# Patient Record
Sex: FEMALE | Race: WHITE | NOT HISPANIC OR LATINO | Employment: OTHER | URBAN - METROPOLITAN AREA
[De-identification: names, ages, dates, MRNs, and addresses within clinical notes are randomized per-mention and may not be internally consistent; named-entity substitution may affect disease eponyms.]

---

## 2017-01-06 ENCOUNTER — TELEPHONE (OUTPATIENT)
Dept: UROLOGY | Facility: CLINIC | Age: 82
End: 2017-01-06

## 2017-01-06 NOTE — TELEPHONE ENCOUNTER
Called and spoke to patient about MD recommendations. Pt was told to call us back and let us know how she is doing.  Patient verbalized understanding.

## 2017-01-06 NOTE — TELEPHONE ENCOUNTER
Spoke to pt she states would like to stop vesicare 10mg it makes her mouth to dry. She states bladder feels better an would like to d/c to see how her bladder does. Please advise/adia

## 2017-01-23 ENCOUNTER — OFFICE VISIT (OUTPATIENT)
Dept: CARDIOLOGY | Facility: CLINIC | Age: 82
End: 2017-01-23
Payer: MEDICARE

## 2017-01-23 VITALS
HEART RATE: 68 BPM | SYSTOLIC BLOOD PRESSURE: 144 MMHG | WEIGHT: 180 LBS | OXYGEN SATURATION: 94 % | HEIGHT: 66 IN | DIASTOLIC BLOOD PRESSURE: 71 MMHG | RESPIRATION RATE: 15 BRPM | BODY MASS INDEX: 28.93 KG/M2

## 2017-01-23 DIAGNOSIS — R60.0 EDEMA OF LOWER EXTREMITY, UNSPECIFIED LATERALITY: ICD-10-CM

## 2017-01-23 DIAGNOSIS — Z86.718 HISTORY OF DVT (DEEP VEIN THROMBOSIS): Primary | Chronic | ICD-10-CM

## 2017-01-23 DIAGNOSIS — G25.0 ESSENTIAL TREMOR: ICD-10-CM

## 2017-01-23 DIAGNOSIS — E78.5 HYPERLIPIDEMIA, UNSPECIFIED HYPERLIPIDEMIA TYPE: Chronic | ICD-10-CM

## 2017-01-23 DIAGNOSIS — I77.9 MILD CAROTID ARTERY DISEASE: ICD-10-CM

## 2017-01-23 DIAGNOSIS — I73.9 PERIPHERAL VASCULAR DISEASE: Chronic | ICD-10-CM

## 2017-01-23 DIAGNOSIS — I10 ESSENTIAL HYPERTENSION: Chronic | ICD-10-CM

## 2017-01-23 PROCEDURE — 99999 PR PBB SHADOW E&M-EST. PATIENT-LVL III: CPT | Mod: PBBFAC,,, | Performed by: INTERNAL MEDICINE

## 2017-01-23 PROCEDURE — 1157F ADVNC CARE PLAN IN RCRD: CPT | Mod: S$GLB,,, | Performed by: INTERNAL MEDICINE

## 2017-01-23 PROCEDURE — 99214 OFFICE O/P EST MOD 30 MIN: CPT | Mod: S$GLB,,, | Performed by: INTERNAL MEDICINE

## 2017-01-23 PROCEDURE — 93000 ELECTROCARDIOGRAM COMPLETE: CPT | Mod: S$GLB,,, | Performed by: INTERNAL MEDICINE

## 2017-01-23 PROCEDURE — 1126F AMNT PAIN NOTED NONE PRSNT: CPT | Mod: S$GLB,,, | Performed by: INTERNAL MEDICINE

## 2017-01-23 PROCEDURE — 1160F RVW MEDS BY RX/DR IN RCRD: CPT | Mod: S$GLB,,, | Performed by: INTERNAL MEDICINE

## 2017-01-23 PROCEDURE — 1159F MED LIST DOCD IN RCRD: CPT | Mod: S$GLB,,, | Performed by: INTERNAL MEDICINE

## 2017-01-23 NOTE — PROGRESS NOTES
CARDIOVASCULAR PROGRESS NOTE    REASON FOR CONSULT:   Patito Mcintyre is a 89 y.o. female who presents for follow up of htn and mesenteric PAD.      HISTORY OF PRESENT ILLNESS:   The patient returns today for follow-up.  She is doing well and denies intercurrent angina or dyspnea.  In the interim since her last office visit, the patient apparently developed a left lower extremity DVT.  This apparently was thought to be provoked by a surgical procedure for a facial mole removal.  The patient is since been placed on Xarelto, and no longer has any edema of the left leg.  She otherwise denies operations, lightheadedness, dizziness, or syncope.  There's been no PND, orthopnea, or lower extremity edema.  She denies melena, hematuria, or claudicant symptoms.    Her blood pressure remains elevated.  She no longer appears to be taking diltiazem.  I suggested we increase her valsartan, however the patient would like to defer increasing her medications at this time.  I've asked the patient to maintain a log of her blood pressures at home and bring these with her to her next office visit.    CARDIOVASCULAR HISTORY:   PAD: Probable high-grade stenosis at the origin of the SMA and left renal arteries. (CTA abd 10/27/15)  [facility]  DVT LLE 8/2016    PAST MEDICAL HISTORY:     Past Medical History   Diagnosis Date    Atherosclerosis of abdominal aorta      noted on CT scan 10/27/2015    Back pain     BPPV (benign paroxysmal positional vertigo)     Cancer      skin cancer    Chronic kidney disease, stage III (moderate)     Dependent edema     Diabetes with neurologic complications     DVT (deep venous thrombosis)     Essential tremor     Hearing loss      with hearing aides 1/2014    History of shingles     Hyperlipidemia LDL goal <100     Hypertension     Macular degeneration of left eye     Macular hole      od    Mild carotid artery disease     Mild hyperparathyroidism     Osteoarthritis     Osteoporosis,  postmenopausal     Overweight(278.02)     Peripheral vascular disease     Polymyalgia rheumatica      followed by rheumatology, Dr. Mendez    Stenosis of abdominal aorta      noted on CT scan 10/27/2015; probable high-grade stenosis at the origin of the SMA and left renal arteries    Type II or unspecified type diabetes mellitus without mention of complication, not stated as uncontrolled     Urinary incontinence     Varicose veins        PAST SURGICAL HISTORY:     Past Surgical History   Procedure Laterality Date    Total abdominal hysterectomy      Bilateral leg vein dilation      Left knee arthroscopy      Bilateral cataract surgery      Left macular degeneration eye surgery      Right knee surgery      Cataract extraction       ou    Skin cancer excision       Basil Cell       ALLERGIES AND MEDICATION:     Review of patient's allergies indicates:   Allergen Reactions    Inderal [propranolol]     Indomethacin sodium      Other reaction(s): Vomiting  Other reaction(s): Nausea     Previous Medications    ACETAMINOPHEN (TYLENOL EXTRA STRENGTH ORAL)    Take by mouth.    ASPIRIN (ECOTRIN) 81 MG EC TABLET    Take 1 tablet by mouth Daily. Pt. Take orange flavored chewable tablet.    ATORVASTATIN (LIPITOR) 40 MG TABLET    Take 1 tablet (40 mg total) by mouth every evening.    CALCIUM CARBONATE/VITAMIN D3 (OS-WENDY 500 + D3 ORAL)    Take by mouth.    CHOLECALCIFEROL, VITAMIN D3, 2,000 UNIT TAB    Take 1 tablet by mouth Daily.    GLUCOSAMINE HCL/CHONDR LEARY A NA (OSTEO BI-FLEX ORAL)    Take 1 tablet by mouth once daily.    OMEGA-3S/DHA/EPA/FISH OIL (OMEGA 3 ORAL)    Take 550 mg by mouth. 1 Capsule Oral Every day    OMEPRAZOLE (PRILOSEC) 20 MG CAPSULE    TAKE 1 CAPSULE BY MOUTH TWICE DAILY    RIVAROXABAN (XARELTO) 20 MG TAB    Take 1 tablet (20 mg total) by mouth daily with dinner or evening meal.    VALSARTAN (DIOVAN) 80 MG TABLET    Take 1 tablet (80 mg total) by mouth once daily.    VITAMIN E 200 UNIT CAPSULE     Take 200 Units by mouth once daily.       SOCIAL HISTORY:     Social History     Social History    Marital status:      Spouse name: N/A    Number of children: N/A    Years of education: N/A     Occupational History    Not on file.     Social History Main Topics    Smoking status: Never Smoker    Smokeless tobacco: Never Used    Alcohol use No    Drug use: No    Sexual activity: No     Other Topics Concern    Not on file     Social History Narrative    She lives by herself. She is originally from [place].       FAMILY HISTORY:     Family History   Problem Relation Age of Onset    Hypertension      Edema      Breast cancer Sister     No Known Problems Mother     No Known Problems Father     No Known Problems Brother     No Known Problems Maternal Aunt     No Known Problems Maternal Uncle     No Known Problems Paternal Aunt     No Known Problems Paternal Uncle     No Known Problems Maternal Grandmother     No Known Problems Maternal Grandfather     No Known Problems Paternal Grandmother     No Known Problems Paternal Grandfather     Amblyopia Neg Hx     Blindness Neg Hx     Cataracts Neg Hx     Diabetes Neg Hx     Glaucoma Neg Hx     Macular degeneration Neg Hx     Retinal detachment Neg Hx     Strabismus Neg Hx     Stroke Neg Hx     Thyroid disease Neg Hx     Cancer Neg Hx        REVIEW OF SYSTEMS:   Review of Systems   Constitutional: Negative for chills, diaphoresis and fever.   HENT: Negative for nosebleeds.    Eyes: Negative for blurred vision, double vision and photophobia.   Respiratory: Negative for hemoptysis, shortness of breath and wheezing.    Cardiovascular: Negative for chest pain, palpitations, orthopnea, claudication, leg swelling and PND.   Gastrointestinal: Negative for abdominal pain, blood in stool, heartburn, melena, nausea and vomiting.   Genitourinary: Negative for flank pain and hematuria.   Musculoskeletal: Negative for falls, myalgias and neck  "pain.   Skin: Negative for rash.   Neurological: Positive for tremors. Negative for dizziness, seizures, loss of consciousness, weakness and headaches.   Endo/Heme/Allergies: Negative for polydipsia. Does not bruise/bleed easily.   Psychiatric/Behavioral: Negative for depression and memory loss. The patient is not nervous/anxious.        PHYSICAL EXAM:     Vitals:    01/23/17 1305   BP: (!) 144/71   Pulse: 68   Resp: 15    Body mass index is 29.05 kg/(m^2).  Weight: 81.6 kg (180 lb)   Height: 5' 6" (167.6 cm)     Physical Exam   Constitutional: She is oriented to person, place, and time. She appears well-developed and well-nourished. She is cooperative.  Non-toxic appearance. No distress.   HENT:   Head: Normocephalic and atraumatic.   Eyes: Conjunctivae and EOM are normal. Pupils are equal, round, and reactive to light. No scleral icterus.   Neck: Trachea normal. Neck supple. Normal carotid pulses and no JVD present. Carotid bruit is not present. No rigidity. No edema present. No thyromegaly present.   Cardiovascular: Normal rate, regular rhythm, S1 normal, S2 normal and normal heart sounds.  PMI is not displaced.  Exam reveals no gallop and no friction rub.    No murmur heard.  Pulses:       Carotid pulses are 2+ on the right side, and 2+ on the left side with bruit.  Pulmonary/Chest: Effort normal and breath sounds normal. No respiratory distress. She has no wheezes. She has no rales. She exhibits no tenderness.   Abdominal: Soft. Bowel sounds are normal. She exhibits no distension and no mass. There is no hepatosplenomegaly. There is no tenderness.   Musculoskeletal: She exhibits no edema or tenderness.   Feet:   Right Foot:   Skin Integrity: Negative for ulcer.   Left Foot:   Skin Integrity: Negative for ulcer.   Neurological: She is alert and oriented to person, place, and time. She displays tremor. No cranial nerve deficit.   Skin: Skin is warm and dry. No rash noted. No erythema.   Psychiatric: She has a " normal mood and affect. Her speech is normal and behavior is normal.   Vitals reviewed.      DATA:   EKG: (personally reviewed tracing)  1/23/17 NSR 84, PRWP    Laboratory:  CBC:    Recent Labs  Lab 08/06/16  0441 08/15/16  2236 08/16/16  0447   WHITE BLOOD CELL COUNT 5.58 7.19 6.21   HEMOGLOBIN 13.5 13.2 11.9 L   HEMATOCRIT 41.6 38.2 34.4 L   PLATELETS 177 265 245       CHEMISTRIES:    Recent Labs  Lab 08/16/16 0447 08/18/16 0437 08/18/16 1714 08/19/16 0435   GLUCOSE 102  < > 95 119 H 103   SODIUM 116 LL  < > 137 137 137   POTASSIUM 3.8  < > 4.1 4.3 4.2   BUN BLD 9 L  < > 12 13 12   CREATININE 0.8  < > 0.8 0.9 0.8   EGFR IF AFRICAN AMERICAN >60  < > >60 >60 >60   EGFR IF NON- >60  < > >60 56 A >60   CALCIUM 8.1 L  < > 8.9 9.2 9.1   MAGNESIUM 1.6  --  2.1  --  2.2   < > = values in this interval not displayed.    CARDIAC BIOMARKERS:    Recent Labs  Lab 08/15/16  2236   TROPONIN I <0.006       COAGS:    Recent Labs  Lab 08/05/16 1718 08/06/16 0441   INR 1.0 1.0       LIPIDS/LFTS:    Recent Labs  Lab 04/25/14  0902 07/15/15  0802  03/28/16  0933  08/06/16  0441 08/15/16  2236 08/16/16  0447   CHOLESTEROL 170 177  177  --  111 L  --   --   --   --    TRIGLYCERIDES 141 116  116  --  90  --   --   --   --    HDL 36 L 45  45  --  38 L  --   --   --   --    LDL CHOLESTEROL 105.8 108.8  108.8  --  55.0 L  --   --   --   --    NON-HDL CHOLESTEROL 134 132  132  --  73  --   --   --   --    AST 20 16  < > 30  < > 22 29 25   ALT 14 12  < > 17  < > 16 19 19   < > = values in this interval not displayed.    Cardiovascular Testing:  LLE venous US 8/5/16  There is extensive left lower extremity deep venous thrombosis. This finding was discussed with Dr. Wallis at 4:30 on August 5, 2016 and the patient was sent to the emergency room for further care.    Echo 7/11/16:    1 - Normal left ventricular systolic function (EF 55-60%).  Basal inferior hypokinesis.     2 - Concentric remodeling.     3 - Left  ventricular diastolic dysfunction.     4 - Trivial to mild mitral regurgitation.     5 - Mild tricuspid regurgitation.     6 - Pulmonary hypertension. The estimated PA systolic pressure is 43 mmHg.     CTA abdomen 10/27/15:  Distended contrast-filled bladder. No vesico-enteric fistula identified. Trace amount of contrast seen in the vaginal canal.  Extensive calcified atherosclerotic disease with probable high-grade stenosis at the origin of the SMA and left renal arteries.  Significantly distended gas and fluid-filled stomach.    Carotid US 1/8/16  #1. Findings consistent with less than 50% stenosis in the Right carotid artery bulb.  #2. Findings consistent with less than 50% stenosis in the Left carotid artery bulb.    ASSESSMENT:   # DVT LLE 8/2016, now on Xarelto  # Peripheral vascular disease as evidence by the vascular stenoses of her SMA and L renal arteries. Fortunately, these do not seem to be causing and significant problems/symptoms at this juncture.  # Carotid bruits, pt denies prior h/o CVA/TIA. Carotid US 1/8/16 neg for significant stenosis  # HTN, mildly uncontrolled  # Tachycardia, resolved  # HLP, LDL acceptable  # Venous insufficiency s/p venous stripping    PLAN:   Check LLE venous US to assess for resolution of DVT.  If gone, can stop Xarelto  Cont asa/statin/ARB  Pt to monitor BP at home and bring list to next OV (se did not want to inc her valsartan at this time)  RTC 1 month    Scotty Landin MD, FACC

## 2017-01-23 NOTE — MR AVS SNAPSHOT
[facility]  [address] [place] [address]  Phone: [phone]                  Patito BARRY Sample   2017 1:00 PM   Office Visit    Description:  Female : 3/7/1925   Provider:  Scotty Landin MD   Department[facility]  [facility]           Reason for Visit     Peripheral Vascular Disease                To Do List           Future Appointments        Provider Department Dept Phone    2017 10:00 AM Seema Alejo MD [facility] - Urology [phone]      Goals (5 Years of Data)              8/18/16    3/28/16    7/15/15    HEMOGLOBIN A1C < 7.0   6.0  5.9  6.1    Related Problems    Type 2 diabetes, controlled, with neuropathy      [facility]     [facility] -  Assistance  Registered nurses in the [facility] provide clinical advisement, health education, appointment booking, and other advisory services.  Call for this free service at [phone].             Medications           Message regarding Medications     Verify the changes and/or additions to your medication regime listed below are the same as discussed with your clinician today.  If any of these changes or additions are incorrect, please notify your healthcare provider.             Verify that the below list of medications is an accurate representation of the medications you are currently taking.  If none reported, the list may be blank. If incorrect, please contact your healthcare provider. Carry this list with you in case of emergency.           Current Medications     ACETAMINOPHEN (TYLENOL EXTRA STRENGTH ORAL) Take by mouth.    aspirin (ECOTRIN) 81 MG EC tablet Take 1 tablet by mouth Daily. Pt. Take orange flavored chewable tablet.    atorvastatin (LIPITOR) 40 MG tablet Take 1 tablet (40 mg total) by mouth every evening.    CALCIUM CARBONATE/VITAMIN D3 (OS-WENDY 500 + D3 ORAL) Take by mouth.    cholecalciferol, vitamin D3, 2,000 unit Tab Take 1 tablet by mouth  "Daily.    GLUCOSAMINE HCL/CHONDR LEARY A NA (OSTEO BI-FLEX ORAL) Take 1 tablet by mouth once daily.    OMEGA-3S/DHA/EPA/FISH OIL (OMEGA 3 ORAL) Take 550 mg by mouth. 1 Capsule Oral Every day    omeprazole (PRILOSEC) 20 MG capsule TAKE 1 CAPSULE BY MOUTH TWICE DAILY    rivaroxaban (XARELTO) 20 mg Tab Take 1 tablet (20 mg total) by mouth daily with dinner or evening meal.    valsartan (DIOVAN) 80 MG tablet Take 1 tablet (80 mg total) by mouth once daily.    vitamin E 200 UNIT capsule Take 200 Units by mouth once daily.           Clinical Reference Information           Vital Signs - Last Recorded  Most recent update: 1/23/2017  1:11 PM by Dez Kilgore MA    BP Pulse Resp Ht Wt SpO2    (!) 144/71 68 15 5' 6" (1.676 m) 81.6 kg (180 lb) (!) 94%    BMI                29.05 kg/m2          Blood Pressure          Most Recent Value    BP  (!)  144/71      Allergies as of 1/23/2017     Inderal [Propranolol]    Indomethacin Sodium      Immunizations Administered on Date of Encounter - 1/23/2017     None      "

## 2017-02-09 ENCOUNTER — HOSPITAL ENCOUNTER (OUTPATIENT)
Dept: RADIOLOGY | Facility: HOSPITAL | Age: 82
Discharge: HOME | End: 2017-02-09
Attending: INTERNAL MEDICINE
Payer: MEDICARE

## 2017-02-09 DIAGNOSIS — R60.0 EDEMA OF LOWER EXTREMITY, UNSPECIFIED LATERALITY: ICD-10-CM

## 2017-02-09 DIAGNOSIS — Z86.718 HISTORY OF DVT (DEEP VEIN THROMBOSIS): Chronic | ICD-10-CM

## 2017-02-09 PROCEDURE — 93971 EXTREMITY STUDY: CPT | Mod: 26,,, | Performed by: RADIOLOGY

## 2017-02-09 PROCEDURE — 93971 EXTREMITY STUDY: CPT | Mod: TC

## 2017-02-14 ENCOUNTER — TELEPHONE (OUTPATIENT)
Dept: CARDIOLOGY | Facility: CLINIC | Age: 82
End: 2017-02-14

## 2017-02-14 NOTE — TELEPHONE ENCOUNTER
Left lower extremity venous ultrasound reveals resolution of previously noted DVT.  I will have my office informed the patient that she can discontinue the xarelto.

## 2017-02-15 ENCOUNTER — OFFICE VISIT (OUTPATIENT)
Dept: OPTOMETRY | Facility: CLINIC | Age: 82
End: 2017-02-15
Payer: MEDICARE

## 2017-02-15 DIAGNOSIS — H26.492 PCO (POSTERIOR CAPSULAR OPACIFICATION), LEFT: Primary | ICD-10-CM

## 2017-02-15 DIAGNOSIS — H52.12 MYOPIA, LEFT EYE: ICD-10-CM

## 2017-02-15 PROCEDURE — 92014 COMPRE OPH EXAM EST PT 1/>: CPT | Mod: S$GLB,,, | Performed by: OPTOMETRIST

## 2017-02-15 PROCEDURE — 92015 DETERMINE REFRACTIVE STATE: CPT | Mod: S$GLB,,, | Performed by: OPTOMETRIST

## 2017-02-15 PROCEDURE — 99999 PR PBB SHADOW E&M-EST. PATIENT-LVL II: CPT | Mod: PBBFAC,,, | Performed by: OPTOMETRIST

## 2017-02-15 NOTE — PROGRESS NOTES
HPI     DLS: 5/11/16    Pt c/o vision changes in OS and also feels like she has something in OU   but more of the OS.   Denies flashes.  Hx of Macular hole OD    Meds: Systane ou prn       Last edited by Yimi Colon, OD on 2/15/2017  1:46 PM.         Assessment /Plan     For exam results, see Encounter Report.    PCO (posterior capsular opacification), left  -     Ambulatory Referral to Ophthalmology  -referral Dr. Faulkner YAG consult    Myopia, left eye  -Hold 2/2 PCO  -same Rx as current sRx      RTC as directed OMD

## 2017-02-15 NOTE — MR AVS SNAPSHOT
[facility]  [address] [facility]  [place] [address]  Phone: [phone]  Fax: [phone]                  Patito BARRY Sample   2/15/2017 1:00 PM   Office Visit    Description:  Female : 3/7/1925   Provider:  Yimi Colon OD   Department:  [facility]           Reason for Visit     Concerns About Ocular Health           Diagnoses this Visit        Comments    PCO (posterior capsular opacification), left    -  Primary     Myopia, left eye                To Do List           Future Appointments        Provider Department Dept Phone    2017 1:00 PM Scotty Landin MD [facility] [phone]    2017 10:00 AM Seema Alejo MD [facility] [phone]      Goals (5 Years of Data)              8/18/16    3/28/16    7/15/15    HEMOGLOBIN A1C < 7.0   6.0  5.9  6.1    Related Problems    Type 2 diabetes, controlled, with neuropathy      [facility]     [facility] On [facility] Nurse Care Line -  Assistance  Registered nurses in the [facility] provide clinical advisement, health education, appointment booking, and other advisory services.  Call for this free service at [phone].             Medications           Message regarding Medications     Verify the changes and/or additions to your medication regime listed below are the same as discussed with your clinician today.  If any of these changes or additions are incorrect, please notify your healthcare provider.             Verify that the below list of medications is an accurate representation of the medications you are currently taking.  If none reported, the list may be blank. If incorrect, please contact your healthcare provider. Carry this list with you in case of emergency.           Current Medications     ACETAMINOPHEN (TYLENOL EXTRA STRENGTH ORAL) Take by mouth.    aspirin (ECOTRIN) 81 MG EC tablet Take 1 tablet by mouth Daily. Pt. Take orange flavored chewable tablet.    atorvastatin  (LIPITOR) 40 MG tablet Take 1 tablet (40 mg total) by mouth every evening.    CALCIUM CARBONATE/VITAMIN D3 (OS-WENDY 500 + D3 ORAL) Take by mouth.    cholecalciferol, vitamin D3, 2,000 unit Tab Take 1 tablet by mouth Daily.    GLUCOSAMINE HCL/CHONDR VERA A NA (OSTEO BI-FLEX ORAL) Take 1 tablet by mouth once daily.    OMEGA-3S/DHA/EPA/FISH OIL (OMEGA 3 ORAL) Take 550 mg by mouth. 1 Capsule Oral Every day    omeprazole (PRILOSEC) 20 MG capsule TAKE 1 CAPSULE BY MOUTH TWICE DAILY    valsartan (DIOVAN) 80 MG tablet Take 1 tablet (80 mg total) by mouth once daily.    vitamin E 200 UNIT capsule Take 200 Units by mouth once daily.           Clinical Reference Information           Allergies as of 2/15/2017     Inderal [Propranolol]    Indomethacin Sodium      Immunizations Administered on Date of Encounter - 2/15/2017     None      Orders Placed During Today's Visit      Normal Orders This Visit    Ambulatory Referral to Ophthalmology       Language Assistance Services     ATTENTION: Language assistance services are available, free of charge. Please call [phone].      ATENCIÓN: Si aleciala sherrilucian, tiene a vera disposición servicios gratuitos de asistencia lingüística. Llame al [phone].     HAYLEY Ý: N?u b?n nói Ti?ng Vi?t, có các d?ch v? h? tr? ngôn ng? mi?n phí dành cho b?n. G?i s? [phone].         [facility] complies with applicable Federal civil rights laws and does not discriminate on the basis of race, color, national origin, age, disability, or sex.

## 2017-02-23 ENCOUNTER — OFFICE VISIT (OUTPATIENT)
Dept: CARDIOLOGY | Facility: CLINIC | Age: 82
End: 2017-02-23
Payer: MEDICARE

## 2017-02-23 VITALS
RESPIRATION RATE: 15 BRPM | OXYGEN SATURATION: 95 % | SYSTOLIC BLOOD PRESSURE: 137 MMHG | WEIGHT: 179 LBS | DIASTOLIC BLOOD PRESSURE: 85 MMHG | HEART RATE: 95 BPM | BODY MASS INDEX: 28.77 KG/M2 | HEIGHT: 66 IN

## 2017-02-23 DIAGNOSIS — Z86.718 HISTORY OF DVT (DEEP VEIN THROMBOSIS): Chronic | ICD-10-CM

## 2017-02-23 DIAGNOSIS — E78.5 HYPERLIPIDEMIA, UNSPECIFIED HYPERLIPIDEMIA TYPE: Chronic | ICD-10-CM

## 2017-02-23 DIAGNOSIS — G25.0 ESSENTIAL TREMOR: ICD-10-CM

## 2017-02-23 DIAGNOSIS — I77.9 MILD CAROTID ARTERY DISEASE: ICD-10-CM

## 2017-02-23 DIAGNOSIS — I73.9 PERIPHERAL VASCULAR DISEASE: Chronic | ICD-10-CM

## 2017-02-23 DIAGNOSIS — I10 ESSENTIAL HYPERTENSION: Primary | Chronic | ICD-10-CM

## 2017-02-23 PROCEDURE — 1126F AMNT PAIN NOTED NONE PRSNT: CPT | Mod: S$GLB,,, | Performed by: INTERNAL MEDICINE

## 2017-02-23 PROCEDURE — 1159F MED LIST DOCD IN RCRD: CPT | Mod: S$GLB,,, | Performed by: INTERNAL MEDICINE

## 2017-02-23 PROCEDURE — 99214 OFFICE O/P EST MOD 30 MIN: CPT | Mod: S$GLB,,, | Performed by: INTERNAL MEDICINE

## 2017-02-23 PROCEDURE — 1157F ADVNC CARE PLAN IN RCRD: CPT | Mod: S$GLB,,, | Performed by: INTERNAL MEDICINE

## 2017-02-23 PROCEDURE — 1160F RVW MEDS BY RX/DR IN RCRD: CPT | Mod: S$GLB,,, | Performed by: INTERNAL MEDICINE

## 2017-02-23 PROCEDURE — 99999 PR PBB SHADOW E&M-EST. PATIENT-LVL III: CPT | Mod: PBBFAC,,, | Performed by: INTERNAL MEDICINE

## 2017-02-23 NOTE — PROGRESS NOTES
CARDIOVASCULAR PROGRESS NOTE    REASON FOR CONSULT:   Patito Mcintyre is a 89 y.o. female who presents for follow up of htn, DVT.      HISTORY OF PRESENT ILLNESS:   The patient returns today for follow-up.  She is doing well and denies intercurrent angina or dyspnea.  He had no palpitations, lightheadedness, dizziness, or syncope.  There is been no PND, orthopnea, or lower extremity edema.  She denies melena, hematuria, or claudicant symptoms.  She does describe some discomfort in her eyes and the feeling of a foreign body sensation for which she has follow-up with ophthalmology planned in early March.  In the interim since her last office visit, a lower extremity venous ultrasound has revealed resolution of her left lower extremity DVT, and she's been taken off Xarelto.    CARDIOVASCULAR HISTORY:   PAD: Probable high-grade stenosis at the origin of the SMA and left renal arteries. (CTA abd 10/27/15)  CVI  DVT LLE 8/2016, resolved on venous US 2/2017    PAST MEDICAL HISTORY:     Past Medical History   Diagnosis Date    Atherosclerosis of abdominal aorta      noted on CT scan 10/27/2015    Back pain     BPPV (benign paroxysmal positional vertigo)     Cancer      skin cancer    Chronic kidney disease, stage III (moderate)     Dependent edema     Diabetes with neurologic complications     DVT (deep venous thrombosis)     Essential tremor     Hearing loss      with hearing aides 1/2014    History of shingles     Hyperlipidemia LDL goal <100     Hypertension     Macular degeneration of left eye     Macular hole      od    Mild carotid artery disease     Mild hyperparathyroidism     Osteoarthritis     Osteoporosis, postmenopausal     Overweight(278.02)     Peripheral vascular disease     Polymyalgia rheumatica      followed by rheumatology, Dr. Mendez    Stenosis of abdominal aorta      noted on CT scan 10/27/2015; probable high-grade stenosis at the origin of the SMA and left renal arteries    Type  II or unspecified type diabetes mellitus without mention of complication, not stated as uncontrolled     Urinary incontinence     Varicose veins        PAST SURGICAL HISTORY:     Past Surgical History   Procedure Laterality Date    Total abdominal hysterectomy      Bilateral leg vein dilation      Left knee arthroscopy      Bilateral cataract surgery      Left macular degeneration eye surgery      Right knee surgery      Cataract extraction       ou    Skin cancer excision       Basil Cell       ALLERGIES AND MEDICATION:     Review of patient's allergies indicates:   Allergen Reactions    Inderal [propranolol]     Indomethacin sodium      Other reaction(s): Vomiting  Other reaction(s): Nausea     Previous Medications    ACETAMINOPHEN (TYLENOL EXTRA STRENGTH ORAL)    Take by mouth.    ASPIRIN (ECOTRIN) 81 MG EC TABLET    Take 1 tablet by mouth Daily. Pt. Take orange flavored chewable tablet.    ATORVASTATIN (LIPITOR) 40 MG TABLET    Take 1 tablet (40 mg total) by mouth every evening.    CALCIUM CARBONATE/VITAMIN D3 (OS-EWNDY 500 + D3 ORAL)    Take by mouth.    CHOLECALCIFEROL, VITAMIN D3, 2,000 UNIT TAB    Take 1 tablet by mouth Daily.    GLUCOSAMINE HCL/CHONDR LEARY A NA (OSTEO BI-FLEX ORAL)    Take 1 tablet by mouth once daily.    OMEGA-3S/DHA/EPA/FISH OIL (OMEGA 3 ORAL)    Take 550 mg by mouth. 1 Capsule Oral Every day    OMEPRAZOLE (PRILOSEC) 20 MG CAPSULE    TAKE 1 CAPSULE BY MOUTH TWICE DAILY    VALSARTAN (DIOVAN) 80 MG TABLET    Take 1 tablet (80 mg total) by mouth once daily.    VITAMIN E 200 UNIT CAPSULE    Take 200 Units by mouth once daily.       SOCIAL HISTORY:     Social History     Social History    Marital status:      Spouse name: N/A    Number of children: N/A    Years of education: N/A     Occupational History    Not on file.     Social History Main Topics    Smoking status: Never Smoker    Smokeless tobacco: Never Used    Alcohol use No    Drug use: No    Sexual activity: No      Other Topics Concern    Not on file     Social History Narrative    She lives by herself. She is originally from [place].       FAMILY HISTORY:     Family History   Problem Relation Age of Onset    Hypertension      Edema      Breast cancer Sister     No Known Problems Mother     No Known Problems Father     No Known Problems Brother     No Known Problems Maternal Aunt     No Known Problems Maternal Uncle     No Known Problems Paternal Aunt     No Known Problems Paternal Uncle     No Known Problems Maternal Grandmother     No Known Problems Maternal Grandfather     No Known Problems Paternal Grandmother     No Known Problems Paternal Grandfather     Amblyopia Neg Hx     Blindness Neg Hx     Cataracts Neg Hx     Diabetes Neg Hx     Glaucoma Neg Hx     Macular degeneration Neg Hx     Retinal detachment Neg Hx     Strabismus Neg Hx     Stroke Neg Hx     Thyroid disease Neg Hx     Cancer Neg Hx        REVIEW OF SYSTEMS:   Review of Systems   Constitutional: Negative for chills, diaphoresis and fever.   HENT: Negative for nosebleeds.    Eyes: Negative for blurred vision, double vision and photophobia.   Respiratory: Negative for hemoptysis, shortness of breath and wheezing.    Cardiovascular: Negative for chest pain, palpitations, orthopnea, claudication, leg swelling and PND.   Gastrointestinal: Negative for abdominal pain, blood in stool, heartburn, melena, nausea and vomiting.   Genitourinary: Negative for flank pain and hematuria.   Musculoskeletal: Negative for falls, myalgias and neck pain.   Skin: Negative for rash.   Neurological: Positive for tremors. Negative for dizziness, seizures, loss of consciousness, weakness and headaches.   Endo/Heme/Allergies: Negative for polydipsia. Does not bruise/bleed easily.   Psychiatric/Behavioral: Negative for depression and memory loss. The patient is not nervous/anxious.        PHYSICAL EXAM:     Vitals:    02/23/17 1303   BP: 137/85   Pulse: 95  "  Resp: 15    Body mass index is 28.89 kg/(m^2).  Weight: 81.2 kg (179 lb 0.2 oz)   Height: 5' 6" (167.6 cm)     Physical Exam   Constitutional: She is oriented to person, place, and time. She appears well-developed and well-nourished. She is cooperative.  Non-toxic appearance. No distress.   HENT:   Head: Normocephalic and atraumatic.   Eyes: Conjunctivae and EOM are normal. Pupils are equal, round, and reactive to light. No scleral icterus.   Neck: Trachea normal and normal range of motion. Neck supple. Normal carotid pulses and no JVD present. Carotid bruit is not present. No rigidity. No edema present. No thyromegaly present.   Cardiovascular: Normal rate, regular rhythm, S1 normal and S2 normal.  PMI is not displaced.  Exam reveals no gallop and no friction rub.    No murmur heard.  Pulses:       Carotid pulses are 2+ on the right side, and 2+ on the left side with bruit.  Pulmonary/Chest: Effort normal and breath sounds normal. No respiratory distress. She has no wheezes. She has no rales. She exhibits no tenderness.   Abdominal: Soft. Bowel sounds are normal. She exhibits no distension and no mass. There is no hepatosplenomegaly. There is no tenderness.   Musculoskeletal: She exhibits no edema or tenderness.   Feet:   Right Foot:   Skin Integrity: Negative for ulcer.   Left Foot:   Skin Integrity: Negative for ulcer.   Neurological: She is alert and oriented to person, place, and time. She displays tremor. No cranial nerve deficit.   Skin: Skin is warm and dry. No rash noted. No erythema.   Psychiatric: She has a normal mood and affect. Her speech is normal and behavior is normal.   Vitals reviewed.      DATA:   EKG: (personally reviewed tracing)  1/23/17 NSR 84, PRWP    Laboratory:  CBC:    Recent Labs  Lab 08/06/16  0441 08/15/16  2236 08/16/16  0447   WHITE BLOOD CELL COUNT 5.58 7.19 6.21   HEMOGLOBIN 13.5 13.2 11.9 L   HEMATOCRIT 41.6 38.2 34.4 L   PLATELETS 177 265 245       CHEMISTRIES:    Recent " Labs  Lab 08/16/16 0447  08/18/16 0437 08/18/16 1714 08/19/16 0435   GLUCOSE 102  < > 95 119 H 103   SODIUM 116 LL  < > 137 137 137   POTASSIUM 3.8  < > 4.1 4.3 4.2   BUN BLD 9 L  < > 12 13 12   CREATININE 0.8  < > 0.8 0.9 0.8   EGFR IF AFRICAN AMERICAN >60  < > >60 >60 >60   EGFR IF NON- >60  < > >60 56 A >60   CALCIUM 8.1 L  < > 8.9 9.2 9.1   MAGNESIUM 1.6  --  2.1  --  2.2   < > = values in this interval not displayed.    CARDIAC BIOMARKERS:    Recent Labs  Lab 08/15/16  2236   TROPONIN I <0.006       COAGS:    Recent Labs  Lab 08/05/16 1718 08/06/16 0441   INR 1.0 1.0       LIPIDS/LFTS:    Recent Labs  Lab 04/25/14  0902 07/15/15  0802  03/28/16  0933  08/06/16  0441 08/15/16  2236 08/16/16  0447   CHOLESTEROL 170 177  177  --  111 L  --   --   --   --    TRIGLYCERIDES 141 116  116  --  90  --   --   --   --    HDL 36 L 45  45  --  38 L  --   --   --   --    LDL CHOLESTEROL 105.8 108.8  108.8  --  55.0 L  --   --   --   --    NON-HDL CHOLESTEROL 134 132  132  --  73  --   --   --   --    AST 20 16  < > 30  < > 22 29 25   ALT 14 12  < > 17  < > 16 19 19   < > = values in this interval not displayed.    Cardiovascular Testing:  LLE venous US 2/9/17 (DVT from 8/5/16 resolved)  No evidence of DVT in the left lower extremity.    Echo 7/11/16:    1 - Normal left ventricular systolic function (EF 55-60%).  Basal inferior hypokinesis.     2 - Concentric remodeling.     3 - Left ventricular diastolic dysfunction.     4 - Trivial to mild mitral regurgitation.     5 - Mild tricuspid regurgitation.     6 - Pulmonary hypertension. The estimated PA systolic pressure is 43 mmHg.     CTA abdomen 10/27/15:  Distended contrast-filled bladder. No vesico-enteric fistula identified. Trace amount of contrast seen in the vaginal canal.  Extensive calcified atherosclerotic disease with probable high-grade stenosis at the origin of the SMA and left renal arteries.  Significantly distended gas and fluid-filled  stomach.    Carotid US 1/8/16  #1. Findings consistent with less than 50% stenosis in the Right carotid artery bulb.  #2. Findings consistent with less than 50% stenosis in the Left carotid artery bulb.    ASSESSMENT:   # DVT LLE 8/2016, resolved by US 2/2017  # Peripheral vascular disease as evidence by the vascular stenoses of her SMA and L renal arteries. Fortunately, these do not seem to be causing and significant problems/symptoms at this juncture.  # Carotid bruits, pt denies prior h/o CVA/TIA. Carotid US 1/8/16 neg for significant stenosis  # HTN, controlled  # HLP, LDL acceptable  # Venous insufficiency s/p venous stripping    PLAN:   Cont med rx  RTC 6 months    Scotty Landin MD, FACC

## 2017-02-23 NOTE — MR AVS SNAPSHOT
[facility] - Cardiology  [address] [address] [place] [address]  Phone: [phone]                  Patito BARRY Sample   2017 1:00 PM   Office Visit    Description:  Female : 3/7/1925   Provider:  Scotty Landin MD   Department:  [facility] [place] [place] [facility]           Reason for Visit     Hypertension     Follow-up                To Do List           Future Appointments        Provider Department Dept Phone    3/9/2017 10:00 AM Henrry Faulkner MD [facility] [phone]    2017 10:00 AM Seema Alejo MD [facility] - Urology [phone]      Goals (5 Years of Data)              8/18/16    3/28/16    7/15/15    HEMOGLOBIN A1C < 7.0   6.0  5.9  6.1    Related Problems    Type 2 diabetes, controlled, with neuropathy      [facility] On Call     [facility] On Call Nurse Care Line -  Assistance  Registered nurses in the [facility] On Call Center provide clinical advisement, health education, appointment booking, and other advisory services.  Call for this free service at [phone].             Medications           Message regarding Medications     Verify the changes and/or additions to your medication regime listed below are the same as discussed with your clinician today.  If any of these changes or additions are incorrect, please notify your healthcare provider.             Verify that the below list of medications is an accurate representation of the medications you are currently taking.  If none reported, the list may be blank. If incorrect, please contact your healthcare provider. Carry this list with you in case of emergency.           Current Medications     ACETAMINOPHEN (TYLENOL EXTRA STRENGTH ORAL) Take by mouth.    aspirin (ECOTRIN) 81 MG EC tablet Take 1 tablet by mouth Daily. Pt. Take orange flavored chewable tablet.    atorvastatin (LIPITOR) 40 MG tablet Take 1 tablet (40 mg total) by mouth every evening.    CALCIUM CARBONATE/VITAMIN D3 (OS-WENDY 500 + D3 ORAL) Take  "by mouth.    cholecalciferol, vitamin D3, 2,000 unit Tab Take 1 tablet by mouth Daily.    GLUCOSAMINE HCL/CHONDR VERA A NA (OSTEO BI-FLEX ORAL) Take 1 tablet by mouth once daily.    OMEGA-3S/DHA/EPA/FISH OIL (OMEGA 3 ORAL) Take 550 mg by mouth. 1 Capsule Oral Every day    omeprazole (PRILOSEC) 20 MG capsule TAKE 1 CAPSULE BY MOUTH TWICE DAILY    valsartan (DIOVAN) 80 MG tablet Take 1 tablet (80 mg total) by mouth once daily.    vitamin E 200 UNIT capsule Take 200 Units by mouth once daily.           Clinical Reference Information           Your Vitals Were     BP Pulse Resp Height Weight SpO2    137/85 104 15 5' 6" (1.676 m) 81.2 kg (179 lb 0.2 oz) 95%    BMI                28.89 kg/m2          Blood Pressure          Most Recent Value    BP  137/85      Allergies as of 2/23/2017     Inderal [Propranolol]    Indomethacin Sodium      Immunizations Administered on Date of Encounter - 2/23/2017     None      Language Assistance Services     ATTENTION: Language assistance services are available, free of charge. Please call [phone].      ATENCIÓN: Si habla james, tiene a vera disposición servicios gratuitos de asistencia lingüística. Llame al [phone].     HAYLEY Ý: N?u b?n nói Ti?ng Vi?t, có các d?ch v? h? tr? ngôn ng? mi?n phí dành cho b?n. G?i s? [phone].         [facility] complies with applicable Federal civil rights laws and does not discriminate on the basis of race, color, national origin, age, disability, or sex.        "

## 2017-03-03 DIAGNOSIS — E11.9 TYPE 2 DIABETES MELLITUS WITHOUT COMPLICATION: ICD-10-CM

## 2017-03-06 ENCOUNTER — TELEPHONE (OUTPATIENT)
Dept: OPHTHALMOLOGY | Facility: CLINIC | Age: 82
End: 2017-03-06

## 2017-03-23 ENCOUNTER — INITIAL CONSULT (OUTPATIENT)
Dept: OPHTHALMOLOGY | Facility: CLINIC | Age: 82
End: 2017-03-23
Payer: MEDICARE

## 2017-03-23 VITALS — SYSTOLIC BLOOD PRESSURE: 136 MMHG | HEART RATE: 81 BPM | DIASTOLIC BLOOD PRESSURE: 81 MMHG

## 2017-03-23 DIAGNOSIS — H26.492 PCO (POSTERIOR CAPSULAR OPACIFICATION), LEFT: Primary | ICD-10-CM

## 2017-03-23 DIAGNOSIS — H52.7 REFRACTIVE ERROR: ICD-10-CM

## 2017-03-23 PROCEDURE — 66821 AFTER CATARACT LASER SURGERY: CPT | Mod: LT,S$GLB,, | Performed by: OPHTHALMOLOGY

## 2017-03-23 PROCEDURE — 99999 PR PBB SHADOW E&M-EST. PATIENT-LVL II: CPT | Mod: PBBFAC,,, | Performed by: OPHTHALMOLOGY

## 2017-03-23 PROCEDURE — 92014 COMPRE OPH EXAM EST PT 1/>: CPT | Mod: 57,S$GLB,, | Performed by: OPHTHALMOLOGY

## 2017-03-23 NOTE — LETTER
March 23, 2017      Yimi Colon, OD  [address]  [place] [address]           [facility]  [address] [facility] [address]  [place] [address]  Phone: [phone]  Fax: [phone]          Patient: Patito Mcintyre   MR Number: [id number]   YOB: 1925   Date of Visit: 3/23/2017       Dear Dr. Yimi Colon:    Thank you for referring Patito Mcintyre to me for evaluation. Attached you will find relevant portions of my assessment and plan of care.    If you have questions, please do not hesitate to call me. I look forward to following Patito Mcintyre along with you.    Sincerely,    Henrry Faulkner MD    Enclosure  CC:  No Recipients    If you would like to receive this communication electronically, please contact [email or web address] or [phone] to request more information on [employer] access.    For providers and/or their staff who would like to refer a patient to [facility], please contact us through our one-stop-shop provider referral line, [facility], at [phone].    If you feel you have received this communication in error or would no longer like to receive these types of communications, please e-mail [email or web address]

## 2017-03-23 NOTE — MR AVS SNAPSHOT
[facility]  [address]  [place] [address]  Phone: [phone]  Fax: [phone]                  Patito BARRY Sample   3/23/2017 11:00 AM   Initial consult    Description:  Female : 3/7/1925   Provider:  Henrry Faulkner MD   Department:  [facility]           Reason for Visit     Laser Consultation           Diagnoses this Visit        Comments    PCO (posterior capsular opacification), left    -  Primary     Refractive error                To Do List           Future Appointments        Provider Department Dept Phone    2017 2:30 PM Henrry Faulkner MD [facility] [phone]    7/10/2017 9:40 AM Margaret Stern MD [facility] [phone]    2017 10:40 AM Seema Alejo MD [facility] [phone]      Goals (5 Years of Data)              8/18/16    3/28/16    7/15/15    HEMOGLOBIN A1C < 7.0   6.0  5.9  6.1    Related Problems    Type 2 diabetes, controlled, with neuropathy      Follow-Up and Disposition     Return in about 2 weeks (around 2017) for Post-op Left eye.      [facility] On Call     [facility] On Call Nurse Care Line -  Assistance  Registered nurses in the [facility] On Call Center provide clinical advisement, health education, appointment booking, and other advisory services.  Call for this free service at [phone].             Medications           Message regarding Medications     Verify the changes and/or additions to your medication regime listed below are the same as discussed with your clinician today.  If any of these changes or additions are incorrect, please notify your healthcare provider.             Verify that the below list of medications is an accurate representation of the medications you are currently taking.  If none reported, the list may be blank. If incorrect, please contact your healthcare provider. Carry this list with you in case of emergency.           Current  Medications     ACETAMINOPHEN (TYLENOL EXTRA STRENGTH ORAL) Take by mouth.    aspirin (ECOTRIN) 81 MG EC tablet Take 1 tablet by mouth Daily. Pt. Take orange flavored chewable tablet.    atorvastatin (LIPITOR) 40 MG tablet Take 1 tablet (40 mg total) by mouth every evening.    CALCIUM CARBONATE/VITAMIN D3 (OS-WENDY 500 + D3 ORAL) Take by mouth.    cholecalciferol, vitamin D3, 2,000 unit Tab Take 1 tablet by mouth Daily.    GLUCOSAMINE HCL/CHONDR LEARY A NA (OSTEO BI-FLEX ORAL) Take 1 tablet by mouth once daily.    OMEGA-3S/DHA/EPA/FISH OIL (OMEGA 3 ORAL) Take 550 mg by mouth. 1 Capsule Oral Every day    omeprazole (PRILOSEC) 20 MG capsule TAKE 1 CAPSULE BY MOUTH TWICE DAILY    valsartan (DIOVAN) 80 MG tablet Take 1 tablet (80 mg total) by mouth once daily.    vitamin E 200 UNIT capsule Take 200 Units by mouth once daily.           Clinical Reference Information           Your Vitals Were     BP Pulse                136/81 (BP Location: Right arm, Patient Position: Sitting, BP Method: Automatic) 81          Blood Pressure          Most Recent Value    BP  136/81      Allergies as of 3/23/2017     Inderal [Propranolol]    Indomethacin Sodium      Immunizations Administered on Date of Encounter - 3/23/2017     None      [facility] Sign-Up     Activating your [facility] account is as easy as 1-2-3!     1) Visit [email or web address], select Sign Up Now, enter this activation code and your date of birth, then select Next.  Activation code not generated  Current Patient Portal Status: Account disabled      2) Create a username and password to use when you visit [facility] in the future and select a security question in case you lose your password and select Next.    3) Enter your e-mail address and click Sign Up!    Additional Information  If you have questions, please e-mail [email or web address] or call [phone] to talk to our [facility] staff. Remember, [facility] is NOT to be used for urgent needs. For medical emergencies, dial  911.         Language Assistance Services     ATTENTION: Language assistance services are available, free of charge. Please call [phone].      ATENCIÓN: Si habla james, tiene a vera disposición servicios gratuitos de asistencia lingüística. Llame al [phone].     CHÚ Ý: N?u b?n nói Ti?ng Vi?t, có các d?ch v? h? tr? ngôn ng? mi?n phí dành cho b?n. G?i s? [phone].         [facility] complies with applicable Federal civil rights laws and does not discriminate on the basis of race, color, national origin, age, disability, or sex.

## 2017-03-23 NOTE — PROGRESS NOTES
Subjective:       Patient ID: Patito BARRY Sample is a 89 y.o. female.    Chief Complaint: Laser Consultation (Pt is here for YAg Laser OS)    HPI  Review of Systems    Objective:      Physical Exam    Assessment:       1. PCO (posterior capsular opacification), left    2. Refractive error        Plan:       Visually significant  PCO OS- Pt. Wants Laser.  RE          YAG CAP OS today. TE=   67.0 mj, Avg. 1.0-1.1mj, 64 pulses.     PF taper OS.  RTC 2 wks.

## 2017-04-01 RX ORDER — ATORVASTATIN CALCIUM 40 MG/1
TABLET, FILM COATED ORAL
Qty: 90 TABLET | Refills: 3 | Status: SHIPPED | OUTPATIENT
Start: 2017-04-01 | End: 2018-04-15 | Stop reason: SDUPTHER

## 2017-04-06 ENCOUNTER — OFFICE VISIT (OUTPATIENT)
Dept: OPHTHALMOLOGY | Facility: CLINIC | Age: 82
End: 2017-04-06
Payer: MEDICARE

## 2017-04-06 DIAGNOSIS — Z98.890 POST-OPERATIVE STATE: Primary | ICD-10-CM

## 2017-04-06 DIAGNOSIS — H52.7 REFRACTIVE ERROR: ICD-10-CM

## 2017-04-06 PROCEDURE — 99999 PR PBB SHADOW E&M-EST. PATIENT-LVL II: CPT | Mod: PBBFAC,,, | Performed by: OPHTHALMOLOGY

## 2017-04-06 PROCEDURE — 99024 POSTOP FOLLOW-UP VISIT: CPT | Mod: S$GLB,,, | Performed by: OPHTHALMOLOGY

## 2017-04-06 NOTE — PROGRESS NOTES
Subjective:       Patient ID: Patito BARRY Sample is a 89 y.o. female.    Chief Complaint: Post-op Evaluation (2 week F/U YAG CAP OS. )    HPI  Review of Systems    Objective:      Physical Exam    Assessment:       1. Post-operative state    2. Refractive error        Plan:       S/p YAG CAP OS- Doing well.  RE-Pt does want MRx.        RTC Dr Cloon in 6 mos.

## 2017-04-06 NOTE — MR AVS SNAPSHOT
[facility]  [address] [facility]  [place] [address]  Phone: [phone]  Fax: [phone]                  Patito BARRY Sample   2017 2:30 PM   Office Visit    Description:  Female : 3/7/1925   Provider:  Henrry Faulkner MD   Department:  [facility]           Reason for Visit     Post-op Evaluation           Diagnoses this Visit        Comments    Post-operative state    -  Primary     Refractive error                To Do List           Future Appointments        Provider Department Dept Phone    7/10/2017 9:40 AM Margaret Stern MD [facility] [phone]    2017 10:40 AM Seema Alejo MD [facility] [phone]      Goals (5 Years of Data)              8/18/16    3/28/16    7/15/15    HEMOGLOBIN A1C < 7.0   6.0  5.9  6.1    Related Problems    Type 2 diabetes, controlled, with neuropathy      Follow-Up and Disposition     Return in about 6 months (around 10/6/2017) for Dr Colon, F/U S/P YAG CAP OS..      [facility] On [facility]     [facility] On Call Nurse Care Line -  Assistance  Unless otherwise directed by your provider, please contact [facility] On-Call, our nurse care line that is available for  assistance.     Registered nurses in the [facility] provide: appointment scheduling, clinical advisement, health education, and other advisory services.  Call: [phone] (toll free)               Medications           Message regarding Medications     Verify the changes and/or additions to your medication regime listed below are the same as discussed with your clinician today.  If any of these changes or additions are incorrect, please notify your healthcare provider.             Verify that the below list of medications is an accurate representation of the medications you are currently taking.  If none reported, the list may be blank. If incorrect, please contact your healthcare provider. Carry this list with you in case of  emergency.           Current Medications     ACETAMINOPHEN (TYLENOL EXTRA STRENGTH ORAL) Take by mouth.    aspirin (ECOTRIN) 81 MG EC tablet Take 1 tablet by mouth Daily. Pt. Take orange flavored chewable tablet.    atorvastatin (LIPITOR) 40 MG tablet TAKE 1 TABLET BY MOUTH EVERY EVENING    CALCIUM CARBONATE/VITAMIN D3 (OS-WENDY 500 + D3 ORAL) Take by mouth.    cholecalciferol, vitamin D3, 2,000 unit Tab Take 1 tablet by mouth Daily.    GLUCOSAMINE HCL/CHONDR LEARY A NA (OSTEO BI-FLEX ORAL) Take 1 tablet by mouth once daily.    OMEGA-3S/DHA/EPA/FISH OIL (OMEGA 3 ORAL) Take 550 mg by mouth. 1 Capsule Oral Every day    omeprazole (PRILOSEC) 20 MG capsule TAKE 1 CAPSULE BY MOUTH TWICE DAILY    valsartan (DIOVAN) 80 MG tablet Take 1 tablet (80 mg total) by mouth once daily.    vitamin E 200 UNIT capsule Take 200 Units by mouth once daily.           Clinical Reference Information           Allergies as of 4/6/2017     Inderal [Propranolol]    Indomethacin Sodium      Immunizations Administered on Date of Encounter - 4/6/2017     None      [facility] Sign-Up     Activating your [facility] account is as easy as 1-2-3!     1) Visit [email or web address], select Sign Up Now, enter this activation code and your date of birth, then select Next.  Activation code not generated  Current Patient Portal Status: Account disabled      2) Create a username and password to use when you visit [facility] in the future and select a security question in case you lose your password and select Next.    3) Enter your e-mail address and click Sign Up!    Additional Information  If you have questions, please e-mail [email or web address] or call [phone] to talk to our [facility] staff. Remember, [facility] is NOT to be used for urgent needs. For medical emergencies, dial 911.         Language Assistance Services     ATTENTION: Language assistance services are available, free of charge. Please call [phone].      ATENCIÓN: ki Tapia  disposición servicios gratuitos de asistencia lingüística. Valeria al [phone].     HAYLEY Ý: N?u b?n nói Ti?ng Vi?t, có các d?ch v? h? tr? ngôn ng? mi?n phí dành cho b?n. G?i s? [phone].         [facility] complies with applicable Federal civil rights laws and does not discriminate on the basis of race, color, national origin, age, disability, or sex.

## 2017-04-21 DIAGNOSIS — E11.9 TYPE 2 DIABETES MELLITUS WITHOUT COMPLICATION: ICD-10-CM

## 2017-05-30 DIAGNOSIS — I10 ESSENTIAL HYPERTENSION: Chronic | ICD-10-CM

## 2017-05-30 RX ORDER — VALSARTAN 80 MG/1
TABLET ORAL
Qty: 90 TABLET | Refills: 0 | Status: SHIPPED | OUTPATIENT
Start: 2017-05-30 | End: 2017-09-12 | Stop reason: SDUPTHER

## 2017-07-10 ENCOUNTER — OFFICE VISIT (OUTPATIENT)
Dept: FAMILY MEDICINE | Facility: CLINIC | Age: 82
End: 2017-07-10
Payer: MEDICARE

## 2017-07-10 VITALS
BODY MASS INDEX: 29.66 KG/M2 | WEIGHT: 178 LBS | TEMPERATURE: 99 F | SYSTOLIC BLOOD PRESSURE: 132 MMHG | OXYGEN SATURATION: 96 % | HEART RATE: 98 BPM | HEIGHT: 65 IN | DIASTOLIC BLOOD PRESSURE: 78 MMHG

## 2017-07-10 DIAGNOSIS — M94.9 DISORDER OF BONE AND CARTILAGE: ICD-10-CM

## 2017-07-10 DIAGNOSIS — I10 ESSENTIAL HYPERTENSION: Chronic | ICD-10-CM

## 2017-07-10 DIAGNOSIS — E78.5 HYPERLIPIDEMIA, UNSPECIFIED HYPERLIPIDEMIA TYPE: Chronic | ICD-10-CM

## 2017-07-10 DIAGNOSIS — M89.9 DISORDER OF BONE AND CARTILAGE: ICD-10-CM

## 2017-07-10 DIAGNOSIS — E11.40 TYPE 2 DIABETES, CONTROLLED, WITH NEUROPATHY: Primary | ICD-10-CM

## 2017-07-10 DIAGNOSIS — Q25.1 STENOSIS OF ABDOMINAL AORTA: ICD-10-CM

## 2017-07-10 DIAGNOSIS — G25.0 ESSENTIAL TREMOR: ICD-10-CM

## 2017-07-10 DIAGNOSIS — K21.9 GASTROESOPHAGEAL REFLUX DISEASE WITHOUT ESOPHAGITIS: Chronic | ICD-10-CM

## 2017-07-10 DIAGNOSIS — I70.0 ATHEROSCLEROSIS OF ABDOMINAL AORTA: ICD-10-CM

## 2017-07-10 DIAGNOSIS — I73.9 PERIPHERAL VASCULAR DISEASE: Chronic | ICD-10-CM

## 2017-07-10 PROBLEM — H52.7 REFRACTIVE ERROR: Status: RESOLVED | Noted: 2017-03-23 | Resolved: 2017-07-10

## 2017-07-10 PROBLEM — Z98.890 POST-OPERATIVE STATE: Status: RESOLVED | Noted: 2017-04-06 | Resolved: 2017-07-10

## 2017-07-10 PROCEDURE — 1159F MED LIST DOCD IN RCRD: CPT | Mod: S$GLB,,, | Performed by: INTERNAL MEDICINE

## 2017-07-10 PROCEDURE — 99999 PR PBB SHADOW E&M-EST. PATIENT-LVL III: CPT | Mod: PBBFAC,,, | Performed by: INTERNAL MEDICINE

## 2017-07-10 PROCEDURE — 99499 UNLISTED E&M SERVICE: CPT | Mod: S$GLB,,, | Performed by: INTERNAL MEDICINE

## 2017-07-10 PROCEDURE — 99214 OFFICE O/P EST MOD 30 MIN: CPT | Mod: S$GLB,,, | Performed by: INTERNAL MEDICINE

## 2017-07-10 PROCEDURE — 1126F AMNT PAIN NOTED NONE PRSNT: CPT | Mod: S$GLB,,, | Performed by: INTERNAL MEDICINE

## 2017-07-10 NOTE — PROGRESS NOTES
HISTORY OF PRESENT ILLNESS:  Patito Mcintyre is a 89 y.o. female who presents to the clinic today for Hyperlipidemia; Hypertension; and Gastroesophageal Reflux  .  The patient presents to clinic today for follow-up of her type 2 diabetes mellitus complicated by neuropathy, hypertension, hyperlipidemia.  She states blood pressures at home are well controlled.  She denies cardiac chest pain or shortness of breath.  She recently traveled for 2 weeks to [place].  She wore strong support stockings.  She did not have any problems with lower extremity edema.  She denies any significant symptoms with heartburn or reflux.  She was taken off Xarelto because in February of this year lower extremity ultrasound showed resolution of her DVT.  Followed by cardiology.  She has a stable chronic tremor.  She denies abdominal pain, temperature intolerance, or unexplained changes in her weight.      PAST MEDICAL HISTORY:  Past Medical History:   Diagnosis Date    Atherosclerosis of abdominal aorta     noted on CT scan 10/27/2015    Back pain     BPPV (benign paroxysmal positional vertigo)     Cancer     skin cancer    Chronic kidney disease, stage III (moderate)     Dependent edema     Diabetes with neurologic complications     DVT (deep venous thrombosis)     Essential tremor     Hearing loss     with hearing aides 1/2014    History of shingles     Hyperlipidemia LDL goal <100     Hypertension     Left posterior capsular opacification 3/23/2017    Macular degeneration of left eye     Macular hole     od    Mild carotid artery disease     Mild hyperparathyroidism     Osteoarthritis     Osteoporosis, postmenopausal     Overweight(278.02)     Peripheral vascular disease     Polymyalgia rheumatica     followed by rheumatology, Dr. Mendez    Stenosis of abdominal aorta     noted on CT scan 10/27/2015; probable high-grade stenosis at the origin of the SMA and left renal arteries    Type II or unspecified type  diabetes mellitus without mention of complication, not stated as uncontrolled     Urinary incontinence     Varicose veins        PAST SURGICAL HISTORY:  Past Surgical History:   Procedure Laterality Date    bilateral cataract surgery      bilateral leg vein dilation      CATARACT EXTRACTION      ou    left knee arthroscopy      left macular degeneration eye surgery      right knee surgery      SKIN CANCER EXCISION      Basil Cell    TOTAL ABDOMINAL HYSTERECTOMY      YAG Laser Capsulotomy Left 03/23/2017    Dr. Faulkner       SOCIAL HISTORY:  Social History     Social History    Marital status:      Spouse name: N/A    Number of children: N/A    Years of education: N/A     Occupational History    Not on file.     Social History Main Topics    Smoking status: Never Smoker    Smokeless tobacco: Never Used    Alcohol use No    Drug use: No    Sexual activity: No     Other Topics Concern    Not on file     Social History Narrative    She lives by herself. She is originally from [place].       FAMILY HISTORY:  Family History   Problem Relation Age of Onset    Hypertension      Edema      Breast cancer Sister     No Known Problems Mother     No Known Problems Father     No Known Problems Brother     No Known Problems Maternal Aunt     No Known Problems Maternal Uncle     No Known Problems Paternal Aunt     No Known Problems Paternal Uncle     No Known Problems Maternal Grandmother     No Known Problems Maternal Grandfather     No Known Problems Paternal Grandmother     No Known Problems Paternal Grandfather     Amblyopia Neg Hx     Blindness Neg Hx     Cataracts Neg Hx     Diabetes Neg Hx     Glaucoma Neg Hx     Macular degeneration Neg Hx     Retinal detachment Neg Hx     Strabismus Neg Hx     Stroke Neg Hx     Thyroid disease Neg Hx     Cancer Neg Hx        ALLERGIES AND MEDICATIONS: updated and reviewed.  Review of patient's allergies indicates:   Allergen Reactions     Inderal [propranolol]     Indomethacin sodium      Other reaction(s): Vomiting  Other reaction(s): Nausea     Medication List with Changes/Refills   Current Medications    ACETAMINOPHEN (TYLENOL EXTRA STRENGTH ORAL)    Take by mouth.    ASPIRIN (ECOTRIN) 81 MG EC TABLET    Take 1 tablet by mouth Daily. Pt. Take orange flavored chewable tablet.    ATORVASTATIN (LIPITOR) 40 MG TABLET    TAKE 1 TABLET BY MOUTH EVERY EVENING    CALCIUM CARBONATE/VITAMIN D3 (OS-WENDY 500 + D3 ORAL)    Take by mouth.    CHOLECALCIFEROL, VITAMIN D3, 2,000 UNIT TAB    Take 1 tablet by mouth Daily.    GLUCOSAMINE HCL/CHONDR LEARY A NA (OSTEO BI-FLEX ORAL)    Take 1 tablet by mouth once daily.    OMEGA-3S/DHA/EPA/FISH OIL (OMEGA 3 ORAL)    Take 550 mg by mouth. 1 Capsule Oral Every day    OMEPRAZOLE (PRILOSEC) 20 MG CAPSULE    TAKE 1 CAPSULE BY MOUTH TWICE DAILY    VALSARTAN (DIOVAN) 80 MG TABLET    TAKE 1 TABLET(80 MG) BY MOUTH EVERY DAY    VITAMIN E 200 UNIT CAPSULE    Take 200 Units by mouth once daily.            REVIEW OF SYSTEMS:  General ROS: negative for - chills, fever or malaise  Psychological ROS: negative for - anxiety, depression, sleep disturbances or suicidal ideation  Ophthalmic ROS: negative for - blurry vision or eye pain  ENT ROS: negative for - epistaxis, headaches, nasal congestion, oral lesions or sore throat  Allergy and Immunology ROS: negative for - hives  Hematological and Lymphatic ROS: negative for - swollen lymph nodes  Endocrine ROS: negative for - polydipsia/polyuria or temperature intolerance  Respiratory ROS: no cough, shortness of breath, or wheezing  Cardiovascular ROS: no chest pain or dyspnea on exertion  Gastrointestinal ROS: no abdominal pain, change in bowel habits, or black or bloody stools  Dermatological ROS: negative for mole changes and rash  Musculoskeletal ROS: negative for - joint swelling or muscle pain  Neurological ROS: no TIA or stroke symptoms  Genito-Urinary ROS: no dysuria, trouble  "voiding, or hematuria        PHYSICAL EXAM:   Vitals:    07/10/17 0939   BP: 132/78   Pulse: 98   Temp: 98.5 °F (36.9 °C)     Weight: 80.7 kg (178 lb 0.3 oz)   Height: 5' 5" (165.1 cm)   Body mass index is 29.62 kg/m².     General appearance - alert, well appearing, and in no distress and overweight  Mental status - alert, oriented to person, place, and time, normal mood, behavior, speech, dress, motor activity, and thought processes  Eyes - pupils equal and reactive, extraocular eye movements intact, sclera anicteric  Mouth - mucous membranes moist, pharynx normal without lesions  Neck - supple, no significant adenopathy, carotids upstroke normal bilaterally, no bruits, thyroid exam: thyroid is normal in size without nodules or tenderness  Lymphatics - no palpable lymphadenopathy  Chest - clear to auscultation, no wheezes, rales or rhonchi, symmetric air entry  Heart - normal rate and regular rhythm, no gallops noted  Back exam - limited range of motion, no pain with motion noted during exam  Neurological - alert, oriented, normal speech, no focal findings noted, cranial nerves II through XII intact; tremor noted  Musculoskeletal - no muscular tenderness noted, Mild-Moderate osteoarthritic changes noted to both knee joints. No joint effusions noted.   Extremities - no pedal edema noted, varicose veins noted  Skin - normal coloration and turgor, no rashes, no suspicious skin lesions noted      Protective Sensation (w/ 10 gram monofilament):  Right: Decreased  Left: Decreased    Visual Inspection:  Normal -  Bilateral    Pedal Pulses:   Right: Present  Left: Present    Posterior tibialis:   Right:Present  Left: Present          ASSESSMENT AND PLAN:  1. Type 2 diabetes, controlled, with neuropathy  Diabetes currently is controlled. We discussed diabetic diet and regular exercise. We discussed home blood sugar monitoring, if appropriate. The current medical regimen is effective;  continue present plan and medications.  "   - Hemoglobin A1c; Future    2. Essential hypertension  Discussed sodium restriction, maintaining ideal body weight and regular exercise program as physiologic means to achieve blood pressure control. The patient will strive towards this. The current medical regimen is effective;  continue present plan and medications. Recommended patient to check home readings to monitor and see me for followup as scheduled or sooner as needed. Patient was educated that both decongestant and anti-inflammatory medication may raise blood pressure.   - CBC auto differential; Future    3. Hyperlipidemia, unspecified hyperlipidemia type  We discussed low fat diet and regular exercise.The current medical regimen is effective;  continue present plan and medications.    - Lipid panel; Future  - Comprehensive metabolic panel; Future    4. Gastroesophageal reflux disease without esophagitis  Symptoms controlled. Reflux precautions discussed (eliminate tobacco if a smoker; minimize caffeine, chocolate and red/white peppermint intake; avoid heavy and spicy meals; don't lay down within 2-3 hours after eating). Medication as needed. Patient asked to take medication breaks, if possible - discussed chronic use can limit calcium absorption, increases the risk for intestinal infections, and can cause kidney damage.      5. Essential tremor  Stable. Observe.    6. Atherosclerosis of abdominal aorta  Patient with Atherosclerosis of the Aorta.  Stable/asymptomatic. Currently stable on lipid lowering medication and b/p monitoring.     7. Stenosis of abdominal aorta  Asymptomatic. Observe.    8. Peripheral vascular disease  Stable. Observe. Support stockings as needed.    9. Disorder of bone and cartilage    - DXA Bone Density Spine And Hip; Future          Return in about 6 months (around 1/10/2018), or if symptoms worsen or fail to improve, for annual exam. or sooner as needed.

## 2017-07-17 ENCOUNTER — HOSPITAL ENCOUNTER (OUTPATIENT)
Dept: RADIOLOGY | Facility: CLINIC | Age: 82
Discharge: HOME | End: 2017-07-17
Attending: INTERNAL MEDICINE
Payer: MEDICARE

## 2017-07-17 DIAGNOSIS — M89.9 DISORDER OF BONE AND CARTILAGE: ICD-10-CM

## 2017-07-17 DIAGNOSIS — M94.9 DISORDER OF BONE AND CARTILAGE: ICD-10-CM

## 2017-07-17 PROCEDURE — 77080 DXA BONE DENSITY AXIAL: CPT | Mod: 26,,, | Performed by: INTERNAL MEDICINE

## 2017-07-17 PROCEDURE — 77080 DXA BONE DENSITY AXIAL: CPT | Mod: TC,PO

## 2017-07-27 ENCOUNTER — TELEPHONE (OUTPATIENT)
Dept: FAMILY MEDICINE | Facility: CLINIC | Age: 82
End: 2017-07-27

## 2017-07-27 DIAGNOSIS — M81.0 OSTEOPOROSIS, POSTMENOPAUSAL: ICD-10-CM

## 2017-07-27 RX ORDER — HEPARIN SODIUM (PORCINE) LOCK FLUSH IV SOLN 100 UNIT/ML 100 UNIT/ML
100 SOLUTION INTRAVENOUS
Status: CANCELLED | OUTPATIENT
Start: 2017-07-27

## 2017-07-27 RX ORDER — ALENDRONATE SODIUM 70 MG/1
70 TABLET ORAL
Qty: 4 TABLET | Refills: 11 | Status: CANCELLED | OUTPATIENT
Start: 2017-07-27 | End: 2018-07-27

## 2017-07-27 RX ORDER — ZOLEDRONIC ACID 5 MG/100ML
5 INJECTION, SOLUTION INTRAVENOUS ONCE
Status: CANCELLED | OUTPATIENT
Start: 2017-07-27 | End: 2017-07-27

## 2017-07-27 RX ORDER — SODIUM CHLORIDE 0.9 % (FLUSH) 0.9 %
10 SYRINGE (ML) INJECTION
Status: CANCELLED | OUTPATIENT
Start: 2017-07-27

## 2017-07-27 RX ORDER — ZOLEDRONIC ACID 5 MG/100ML
5 INJECTION, SOLUTION INTRAVENOUS ONCE
Qty: 100 ML | Refills: 0 | Status: SHIPPED | OUTPATIENT
Start: 2017-07-27 | End: 2017-07-27

## 2017-07-27 NOTE — TELEPHONE ENCOUNTER
Upon further chart review: looks like she briefly took actonel and fosamax in the past. In 2015 per BMD result we switched her to Reclast infusion . She did not get an infusion last year. Will schedule her for infusion now.

## 2017-07-27 NOTE — TELEPHONE ENCOUNTER
Please call patient: her BMD result showed thinning of the bones with high fracture risk. I know she was on fosamax in the past, but not sure why she stopped taking the medication some time in 2015 - can she remember why she stopped taking it? We need to start her back on some type of prescription medication in addition to adequate calcium and vitamin D supplementation.

## 2017-07-27 NOTE — TELEPHONE ENCOUNTER
Spoke w/patient, informed of results and recommendation. States she is taking calcium and Vit-D supplements. States she do not remember why she stopped taking the Fosamax, she think someone told her to stop. Requesting a refill for fosamax.

## 2017-07-28 ENCOUNTER — TELEPHONE (OUTPATIENT)
Dept: FAMILY MEDICINE | Facility: CLINIC | Age: 82
End: 2017-07-28

## 2017-07-28 NOTE — TELEPHONE ENCOUNTER
----- Message from Jace Rahman sent at 7/28/2017  9:29 AM CDT -----  Contact: Self/[phone]  Patient states that the Pharmacy did not receive her request that was prescribed on yesterday. She would like the staff to resend her prescriptions. Patient would like the staff to call her once the prescriptions are confirmed Thank you.

## 2017-07-28 NOTE — TELEPHONE ENCOUNTER
Spoke w/patient, states she received the results of her BMD test and it read your results are in an acceptable range. States the MD is Dr.Khoa Spears. Want to know if she still need to have the reclast infusion. Please advise.

## 2017-07-28 NOTE — TELEPHONE ENCOUNTER
Spoke w/patient, informed of recommendation. patient states she did not have reclast infusion last. Informed order placed for infusion and she will be contacted to schedule. Verbalized understanding.

## 2017-07-28 NOTE — TELEPHONE ENCOUNTER
----- Message from Mandy Hou sent at 7/28/2017  9:44 AM CDT -----  Contact: self  Pt called to discuss test results. [phone]

## 2017-08-16 ENCOUNTER — INFUSION (OUTPATIENT)
Dept: INFUSION THERAPY | Facility: HOSPITAL | Age: 82
End: 2017-08-16
Attending: INTERNAL MEDICINE
Payer: MEDICARE

## 2017-08-16 VITALS — HEART RATE: 62 BPM | DIASTOLIC BLOOD PRESSURE: 64 MMHG | RESPIRATION RATE: 16 BRPM | SYSTOLIC BLOOD PRESSURE: 133 MMHG

## 2017-08-16 DIAGNOSIS — M81.0 OSTEOPOROSIS, POSTMENOPAUSAL: Primary | ICD-10-CM

## 2017-08-16 PROCEDURE — 96365 THER/PROPH/DIAG IV INF INIT: CPT

## 2017-08-16 PROCEDURE — 63600175 PHARM REV CODE 636 W HCPCS: Performed by: INTERNAL MEDICINE

## 2017-08-16 RX ORDER — ZOLEDRONIC ACID 5 MG/100ML
5 INJECTION, SOLUTION INTRAVENOUS ONCE
Status: CANCELLED | OUTPATIENT
Start: 2017-08-16 | End: 2017-08-16

## 2017-08-16 RX ORDER — HEPARIN SODIUM (PORCINE) LOCK FLUSH IV SOLN 100 UNIT/ML 100 UNIT/ML
100 SOLUTION INTRAVENOUS
Status: CANCELLED | OUTPATIENT
Start: 2017-08-16

## 2017-08-16 RX ORDER — ZOLEDRONIC ACID 5 MG/100ML
5 INJECTION, SOLUTION INTRAVENOUS ONCE
Status: COMPLETED | OUTPATIENT
Start: 2017-08-16 | End: 2017-08-16

## 2017-08-16 RX ORDER — SODIUM CHLORIDE 0.9 % (FLUSH) 0.9 %
10 SYRINGE (ML) INJECTION
Status: CANCELLED | OUTPATIENT
Start: 2017-08-16

## 2017-08-16 RX ADMIN — ZOLEDRONIC ACID 5 MG: 5 INJECTION, SOLUTION INTRAVENOUS at 10:08

## 2017-08-16 NOTE — PLAN OF CARE
Problem: Patient Care Overview (Adult)  Goal: Discharge Needs Assessment  Outcome: Ongoing (interventions implemented as appropriate)  Pt will follow up with PCP for next year's dose of Reclast. Pt discharged, accompanied by transport.

## 2017-08-16 NOTE — PLAN OF CARE
Problem: Patient Care Overview (Adult)  Goal: Plan of Care Review  Outcome: Ongoing (interventions implemented as appropriate)  Pt has tolerated Reclast in the past. Reinforced pt of potential side effects and to drink plenty of water today.

## 2017-08-16 NOTE — PLAN OF CARE
Problem: Patient Care Overview (Adult)  Goal: Individualization & Mutuality  Outcome: Ongoing (interventions implemented as appropriate)  Pt likes to watch tv during visit.

## 2017-08-23 ENCOUNTER — OFFICE VISIT (OUTPATIENT)
Dept: CARDIOLOGY | Facility: CLINIC | Age: 82
End: 2017-08-23
Payer: MEDICARE

## 2017-08-23 VITALS
WEIGHT: 180 LBS | OXYGEN SATURATION: 95 % | SYSTOLIC BLOOD PRESSURE: 135 MMHG | RESPIRATION RATE: 15 BRPM | DIASTOLIC BLOOD PRESSURE: 62 MMHG | BODY MASS INDEX: 29.99 KG/M2 | HEIGHT: 65 IN | HEART RATE: 98 BPM

## 2017-08-23 DIAGNOSIS — I73.9 PVD (PERIPHERAL VASCULAR DISEASE): ICD-10-CM

## 2017-08-23 DIAGNOSIS — E78.5 HYPERLIPIDEMIA, UNSPECIFIED HYPERLIPIDEMIA TYPE: Chronic | ICD-10-CM

## 2017-08-23 DIAGNOSIS — I73.9 PERIPHERAL VASCULAR DISEASE: Primary | Chronic | ICD-10-CM

## 2017-08-23 DIAGNOSIS — G25.0 ESSENTIAL TREMOR: ICD-10-CM

## 2017-08-23 DIAGNOSIS — I10 ESSENTIAL HYPERTENSION: Chronic | ICD-10-CM

## 2017-08-23 PROCEDURE — 1126F AMNT PAIN NOTED NONE PRSNT: CPT | Mod: S$GLB,,, | Performed by: INTERNAL MEDICINE

## 2017-08-23 PROCEDURE — 3008F BODY MASS INDEX DOCD: CPT | Mod: S$GLB,,, | Performed by: INTERNAL MEDICINE

## 2017-08-23 PROCEDURE — 1159F MED LIST DOCD IN RCRD: CPT | Mod: S$GLB,,, | Performed by: INTERNAL MEDICINE

## 2017-08-23 PROCEDURE — 99214 OFFICE O/P EST MOD 30 MIN: CPT | Mod: S$GLB,,, | Performed by: INTERNAL MEDICINE

## 2017-08-23 PROCEDURE — 99999 PR PBB SHADOW E&M-EST. PATIENT-LVL III: CPT | Mod: PBBFAC,,, | Performed by: INTERNAL MEDICINE

## 2017-08-23 PROCEDURE — 93010 ELECTROCARDIOGRAM REPORT: CPT | Mod: S$GLB,,, | Performed by: INTERNAL MEDICINE

## 2017-08-23 NOTE — PROGRESS NOTES
CARDIOVASCULAR PROGRESS NOTE    REASON FOR CONSULT:   Patito Mcintyre is a 89 y.o. female who presents for follow up of htn, DVT.    PCP: Leena  HISTORY OF PRESENT ILLNESS:   The patient returns today for follow-up accompanied by her son.  She is doing well and denies intercurrent angina or dyspnea.  He had no palpitations, lightheadedness, dizziness, or syncope.  There is been no PND, orthopnea, or lower extremity edema.  She denies melena, hematuria, or claudicant symptoms.    CARDIOVASCULAR HISTORY:   PAD: Probable high-grade stenosis at the origin of the SMA and left renal arteries. (CTA abd 10/27/15)  [facility]  DVT LLE 8/2016, resolved on venous US 2/2017    PAST MEDICAL HISTORY:     Past Medical History:   Diagnosis Date    Atherosclerosis of abdominal aorta     noted on CT scan 10/27/2015    Back pain     BPPV (benign paroxysmal positional vertigo)     Cancer     skin cancer    Chronic kidney disease, stage III (moderate)     Dependent edema     Diabetes with neurologic complications     DVT (deep venous thrombosis)     Essential tremor     Hearing loss     with hearing aides 1/2014    History of shingles     Hyperlipidemia LDL goal <100     Hypertension     Left posterior capsular opacification 3/23/2017    Macular degeneration of left eye     Macular hole     od    Mild carotid artery disease     Mild hyperparathyroidism     Osteoarthritis     Osteoporosis, postmenopausal     Overweight(278.02)     Peripheral vascular disease     Polymyalgia rheumatica     followed by rheumatology, Dr. Mendez    Stenosis of abdominal aorta     noted on CT scan 10/27/2015; probable high-grade stenosis at the origin of the SMA and left renal arteries    Type II or unspecified type diabetes mellitus without mention of complication, not stated as uncontrolled     Urinary incontinence     Varicose veins        PAST SURGICAL HISTORY:     Past Surgical History:   Procedure Laterality Date    bilateral  cataract surgery      bilateral leg vein dilation      CATARACT EXTRACTION      ou    left knee arthroscopy      left macular degeneration eye surgery      right knee surgery      SKIN CANCER EXCISION      Basil Cell    TOTAL ABDOMINAL HYSTERECTOMY      YAG Laser Capsulotomy Left 03/23/2017    Dr. Faulkner       ALLERGIES AND MEDICATION:     Review of patient's allergies indicates:   Allergen Reactions    Inderal [propranolol]     Indomethacin sodium      Other reaction(s): Vomiting  Other reaction(s): Nausea     Previous Medications    ACETAMINOPHEN (TYLENOL EXTRA STRENGTH ORAL)    Take by mouth.    ASPIRIN (ECOTRIN) 81 MG EC TABLET    Take 1 tablet by mouth Daily. Pt. Take orange flavored chewable tablet.    ATORVASTATIN (LIPITOR) 40 MG TABLET    TAKE 1 TABLET BY MOUTH EVERY EVENING    CALCIUM CARBONATE/VITAMIN D3 (OS-WENDY 500 + D3 ORAL)    Take by mouth.    CHOLECALCIFEROL, VITAMIN D3, 2,000 UNIT TAB    Take 1 tablet by mouth Daily.    GLUCOSAMINE HCL/CHONDR LEARY A NA (OSTEO BI-FLEX ORAL)    Take 1 tablet by mouth once daily.    OMEGA-3S/DHA/EPA/FISH OIL (OMEGA 3 ORAL)    Take 550 mg by mouth. 1 Capsule Oral Every day    OMEPRAZOLE (PRILOSEC) 20 MG CAPSULE    TAKE 1 CAPSULE BY MOUTH TWICE DAILY    VALSARTAN (DIOVAN) 80 MG TABLET    TAKE 1 TABLET(80 MG) BY MOUTH EVERY DAY    VITAMIN E 200 UNIT CAPSULE    Take 200 Units by mouth once daily.       SOCIAL HISTORY:     Social History     Social History    Marital status:      Spouse name: N/A    Number of children: N/A    Years of education: N/A     Occupational History    Not on file.     Social History Main Topics    Smoking status: Never Smoker    Smokeless tobacco: Never Used    Alcohol use No    Drug use: No    Sexual activity: No     Other Topics Concern    Not on file     Social History Narrative    She lives by herself. She is originally from [place].       FAMILY HISTORY:     Family History   Problem Relation Age of Onset     "Hypertension      Edema      Breast cancer Sister     No Known Problems Mother     No Known Problems Father     No Known Problems Brother     No Known Problems Maternal Aunt     No Known Problems Maternal Uncle     No Known Problems Paternal Aunt     No Known Problems Paternal Uncle     No Known Problems Maternal Grandmother     No Known Problems Maternal Grandfather     No Known Problems Paternal Grandmother     No Known Problems Paternal Grandfather     Amblyopia Neg Hx     Blindness Neg Hx     Cataracts Neg Hx     Diabetes Neg Hx     Glaucoma Neg Hx     Macular degeneration Neg Hx     Retinal detachment Neg Hx     Strabismus Neg Hx     Stroke Neg Hx     Thyroid disease Neg Hx     Cancer Neg Hx        REVIEW OF SYSTEMS:   Review of Systems   Constitutional: Negative for chills, diaphoresis and fever.   HENT: Negative for nosebleeds.    Eyes: Negative for blurred vision, double vision and photophobia.   Respiratory: Negative for hemoptysis, shortness of breath and wheezing.    Cardiovascular: Negative for chest pain, palpitations, orthopnea, claudication, leg swelling and PND.   Gastrointestinal: Negative for abdominal pain, blood in stool, heartburn, melena, nausea and vomiting.   Genitourinary: Negative for flank pain and hematuria.   Musculoskeletal: Negative for falls, myalgias and neck pain.   Skin: Negative for rash.   Neurological: Positive for tremors. Negative for dizziness, seizures, loss of consciousness, weakness and headaches.   Endo/Heme/Allergies: Negative for polydipsia. Does not bruise/bleed easily.   Psychiatric/Behavioral: Negative for depression and memory loss. The patient is not nervous/anxious.        PHYSICAL EXAM:     Vitals:    08/23/17 1437   BP: 135/62   Pulse: 98   Resp: 15    Body mass index is 29.95 kg/m².  Weight: 81.6 kg (180 lb)   Height: 5' 5" (165.1 cm)     Physical Exam   Constitutional: She is oriented to person, place, and time. She appears " well-developed and well-nourished. She is cooperative.  Non-toxic appearance. No distress.   HENT:   Head: Normocephalic and atraumatic.   Eyes: Conjunctivae and EOM are normal. Pupils are equal, round, and reactive to light. No scleral icterus.   Neck: Trachea normal and normal range of motion. Neck supple. Normal carotid pulses and no JVD present. Carotid bruit is not present. No neck rigidity. No edema present. No thyromegaly present.   Cardiovascular: Normal rate, regular rhythm, S1 normal and S2 normal.  PMI is not displaced.  Exam reveals no gallop and no friction rub.    No murmur heard.  Pulses:       Carotid pulses are 2+ on the right side, and 2+ on the left side with bruit.  Pulmonary/Chest: Effort normal and breath sounds normal. No respiratory distress. She has no wheezes. She has no rales. She exhibits no tenderness.   Abdominal: Soft. Bowel sounds are normal. She exhibits no distension and no mass. There is no hepatosplenomegaly. There is no tenderness.   Musculoskeletal: She exhibits no edema or tenderness.   Feet:   Right Foot:   Skin Integrity: Negative for ulcer.   Left Foot:   Skin Integrity: Negative for ulcer.   Neurological: She is alert and oriented to person, place, and time. She displays tremor. No cranial nerve deficit.   Skin: Skin is warm and dry. No rash noted. No erythema.   Psychiatric: She has a normal mood and affect. Her speech is normal and behavior is normal.   Vitals reviewed.      DATA:   EKG: (personally reviewed tracing)  8/23/17 SR 95, PRWP, similar to 1/23/17    Laboratory:  CBC:    Recent Labs  Lab 08/15/16  2236 08/16/16  0447 07/17/17  0845   WHITE BLOOD CELL COUNT 7.19 6.21 5.70   HEMOGLOBIN 13.2 11.9 L 15.8   HEMATOCRIT 38.2 34.4 L 48.1   PLATELETS 265 245 211       CHEMISTRIES:    Recent Labs  Lab 08/16/16  0447  08/18/16  0437 08/18/16  1714 08/19/16  0435 07/17/17  0845   GLUCOSE 102  < > 95 119 H 103 115 H   SODIUM 116 LL  < > 137 137 137 140   POTASSIUM 3.8  < >  4.1 4.3 4.2 4.2   BUN BLD 9 L  < > 12 13 12 18   CREATININE 0.8  < > 0.8 0.9 0.8 0.9   EGFR IF  >60  < > >60 >60 >60 >60.0   EGFR IF NON- >60  < > >60 56 A >60 55.7 A   CALCIUM 8.1 L  < > 8.9 9.2 9.1 10.1   MAGNESIUM 1.6  --  2.1  --  2.2  --    < > = values in this interval not displayed.    CARDIAC BIOMARKERS:    Recent Labs  Lab 08/15/16  2236   TROPONIN I <0.006       COAGS:    Recent Labs  Lab 08/05/16  1718 08/06/16  0441   INR 1.0 1.0       LIPIDS/LFTS:    Recent Labs  Lab 07/15/15  0802  03/28/16  0933  08/15/16  2236 08/16/16  0447 07/17/17  0845   CHOLESTEROL 177  177  --  111 L  --   --   --  124   TRIGLYCERIDES 116  116  --  90  --   --   --  118   HDL 45  45  --  38 L  --   --   --  41   LDL CHOLESTEROL 108.8  108.8  --  55.0 L  --   --   --  59.4 L   NON-HDL CHOLESTEROL 132  132  --  73  --   --   --  83   AST 16  < > 30  < > 29 25 37   ALT 12  < > 17  < > 19 19 21   < > = values in this interval not displayed.    Cardiovascular Testing:  LLE venous US 2/9/17 (DVT from 8/5/16 resolved)  No evidence of DVT in the left lower extremity.    Echo 7/11/16:    1 - Normal left ventricular systolic function (EF 55-60%).  Basal inferior hypokinesis.     2 - Concentric remodeling.     3 - Left ventricular diastolic dysfunction.     4 - Trivial to mild mitral regurgitation.     5 - Mild tricuspid regurgitation.     6 - Pulmonary hypertension. The estimated PA systolic pressure is 43 mmHg.     CTA abdomen 10/27/15:  Distended contrast-filled bladder. No vesico-enteric fistula identified. Trace amount of contrast seen in the vaginal canal.  Extensive calcified atherosclerotic disease with probable high-grade stenosis at the origin of the SMA and left renal arteries.  Significantly distended gas and fluid-filled stomach.    Carotid US 1/8/16  #1. Findings consistent with less than 50% stenosis in the Right carotid artery bulb.  #2. Findings consistent with less than 50% stenosis  in the Left carotid artery bulb.    ASSESSMENT:   # DVT LLE 8/2016, resolved by US 2/2017  # Peripheral vascular disease as evidence by the vascular stenoses of her SMA and L renal arteries. Fortunately, these do not seem to be causing and significant problems/symptoms at this juncture.  # Carotid bruits, pt denies prior h/o CVA/TIA. Carotid US 1/8/16 neg for significant stenosis  # HTN, controlled  # HLP, LDL acceptable  # Venous insufficiency s/p venous stripping    PLAN:   Cont med rx  RTC 1 year    Scotty Landin MD, FACC

## 2017-09-12 DIAGNOSIS — I10 ESSENTIAL HYPERTENSION: Chronic | ICD-10-CM

## 2017-09-12 RX ORDER — VALSARTAN 80 MG/1
TABLET ORAL
Qty: 90 TABLET | Refills: 0 | Status: SHIPPED | OUTPATIENT
Start: 2017-09-12 | End: 2017-12-18 | Stop reason: SDUPTHER

## 2017-12-18 DIAGNOSIS — I10 ESSENTIAL HYPERTENSION: Chronic | ICD-10-CM

## 2017-12-18 RX ORDER — VALSARTAN 80 MG/1
TABLET ORAL
Qty: 90 TABLET | Refills: 0 | Status: SHIPPED | OUTPATIENT
Start: 2017-12-18 | End: 2018-03-19 | Stop reason: SDUPTHER

## 2018-02-19 ENCOUNTER — OFFICE VISIT (OUTPATIENT)
Dept: FAMILY MEDICINE | Facility: CLINIC | Age: 83
End: 2018-02-19
Payer: MEDICARE

## 2018-02-19 ENCOUNTER — LAB VISIT (OUTPATIENT)
Dept: LAB | Facility: HOSPITAL | Age: 83
End: 2018-02-19
Attending: INTERNAL MEDICINE
Payer: MEDICARE

## 2018-02-19 VITALS
BODY MASS INDEX: 30.76 KG/M2 | HEIGHT: 65 IN | TEMPERATURE: 99 F | SYSTOLIC BLOOD PRESSURE: 106 MMHG | OXYGEN SATURATION: 94 % | DIASTOLIC BLOOD PRESSURE: 70 MMHG | HEART RATE: 100 BPM | WEIGHT: 184.63 LBS

## 2018-02-19 DIAGNOSIS — E66.3 OVERWEIGHT (BMI 25.0-29.9): ICD-10-CM

## 2018-02-19 DIAGNOSIS — I73.9 PERIPHERAL VASCULAR DISEASE: Chronic | ICD-10-CM

## 2018-02-19 DIAGNOSIS — I70.0 ATHEROSCLEROSIS OF ABDOMINAL AORTA: ICD-10-CM

## 2018-02-19 DIAGNOSIS — E78.5 HYPERLIPIDEMIA, UNSPECIFIED HYPERLIPIDEMIA TYPE: ICD-10-CM

## 2018-02-19 DIAGNOSIS — N18.30 BENIGN HYPERTENSION WITH CHRONIC KIDNEY DISEASE, STAGE III: ICD-10-CM

## 2018-02-19 DIAGNOSIS — E11.40 TYPE 2 DIABETES, CONTROLLED, WITH NEUROPATHY: ICD-10-CM

## 2018-02-19 DIAGNOSIS — K21.9 GASTROESOPHAGEAL REFLUX DISEASE WITHOUT ESOPHAGITIS: Chronic | ICD-10-CM

## 2018-02-19 DIAGNOSIS — Q25.1 STENOSIS OF ABDOMINAL AORTA: ICD-10-CM

## 2018-02-19 DIAGNOSIS — M81.0 OSTEOPOROSIS, POSTMENOPAUSAL: ICD-10-CM

## 2018-02-19 DIAGNOSIS — Z00.00 ROUTINE MEDICAL EXAM: Primary | ICD-10-CM

## 2018-02-19 DIAGNOSIS — B02.29 POST HERPETIC NEURALGIA: ICD-10-CM

## 2018-02-19 DIAGNOSIS — Z23 NEED FOR TDAP VACCINATION: ICD-10-CM

## 2018-02-19 DIAGNOSIS — I12.9 BENIGN HYPERTENSION WITH CHRONIC KIDNEY DISEASE, STAGE III: ICD-10-CM

## 2018-02-19 DIAGNOSIS — G25.0 ESSENTIAL TREMOR: ICD-10-CM

## 2018-02-19 PROBLEM — N25.81 SECONDARY HYPERPARATHYROIDISM OF RENAL ORIGIN: Status: ACTIVE | Noted: 2018-02-19

## 2018-02-19 LAB
ESTIMATED AVG GLUCOSE: 126 MG/DL
HBA1C MFR BLD HPLC: 6 %
PTH-INTACT SERPL-MCNC: 71 PG/ML

## 2018-02-19 PROCEDURE — 36415 COLL VENOUS BLD VENIPUNCTURE: CPT | Mod: PO

## 2018-02-19 PROCEDURE — 99999 PR PBB SHADOW E&M-EST. PATIENT-LVL III: CPT | Mod: PBBFAC,,, | Performed by: INTERNAL MEDICINE

## 2018-02-19 PROCEDURE — 83970 ASSAY OF PARATHORMONE: CPT

## 2018-02-19 PROCEDURE — 83036 HEMOGLOBIN GLYCOSYLATED A1C: CPT

## 2018-02-19 PROCEDURE — 99397 PER PM REEVAL EST PAT 65+ YR: CPT | Mod: S$GLB,,, | Performed by: INTERNAL MEDICINE

## 2018-02-19 NOTE — PROGRESS NOTES
HISTORY OF PRESENT ILLNESS:  Patito Mcintyre is a 89 y.o. female who presents to the clinic today for a routine medical physical exam. Her last physical exam was approximately 1 years(s) ago.        PAST MEDICAL HISTORY:  Past Medical History:   Diagnosis Date    Atherosclerosis of abdominal aorta     noted on CT scan 10/27/2015    Back pain     BPPV (benign paroxysmal positional vertigo)     Cancer     skin cancer    Chronic kidney disease, stage III (moderate)     Dependent edema     Diabetes with neurologic complications     DVT (deep venous thrombosis)     Essential tremor     Hearing loss     with hearing aides 1/2014    History of shingles 2017    Hyperlipidemia LDL goal <100     Hypertension     Left posterior capsular opacification 3/23/2017    Macular degeneration of left eye     Macular hole     od    Mild carotid artery disease     Mild hyperparathyroidism     Osteoarthritis     Osteoporosis, postmenopausal     Overweight(278.02)     Peripheral vascular disease     Polymyalgia rheumatica     followed by rheumatology, Dr. Mendez    Stenosis of abdominal aorta     noted on CT scan 10/27/2015; probable high-grade stenosis at the origin of the SMA and left renal arteries    Type II or unspecified type diabetes mellitus without mention of complication, not stated as uncontrolled     Urinary incontinence     Varicose veins        PAST SURGICAL HISTORY:  Past Surgical History:   Procedure Laterality Date    bilateral cataract surgery      bilateral leg vein dilation      CATARACT EXTRACTION      ou    left knee arthroscopy      left macular degeneration eye surgery      right knee surgery      SKIN CANCER EXCISION  2002, 2017    Basil Cell; face    TOTAL ABDOMINAL HYSTERECTOMY      YAG Laser Capsulotomy Left 03/23/2017    Dr. Faulkner       SOCIAL HISTORY:  Social History     Social History    Marital status:      Spouse name: N/A    Number of children: N/A    Years  of education: N/A     Occupational History    Not on file.     Social History Main Topics    Smoking status: Never Smoker    Smokeless tobacco: Never Used    Alcohol use No    Drug use: No    Sexual activity: No     Other Topics Concern    Not on file     Social History Narrative    She lives by herself. She is originally from [place].       FAMILY HISTORY:  Family History   Problem Relation Age of Onset    Hypertension      Edema      Breast cancer Sister     No Known Problems Mother     No Known Problems Father     No Known Problems Brother     No Known Problems Maternal Aunt     No Known Problems Maternal Uncle     No Known Problems Paternal Aunt     No Known Problems Paternal Uncle     No Known Problems Maternal Grandmother     No Known Problems Maternal Grandfather     No Known Problems Paternal Grandmother     No Known Problems Paternal Grandfather     Amblyopia Neg Hx     Blindness Neg Hx     Cataracts Neg Hx     Diabetes Neg Hx     Glaucoma Neg Hx     Macular degeneration Neg Hx     Retinal detachment Neg Hx     Strabismus Neg Hx     Stroke Neg Hx     Thyroid disease Neg Hx     Cancer Neg Hx        ALLERGIES AND MEDICATIONS: updated and reviewed.  Review of patient's allergies indicates:   Allergen Reactions    Inderal [propranolol]     Indomethacin sodium      Other reaction(s): Vomiting  Other reaction(s): Nausea     Medication List with Changes/Refills   Current Medications    ACETAMINOPHEN (TYLENOL ORAL)    Take 1 tablet by mouth as needed.    ASPIRIN (ECOTRIN) 81 MG EC TABLET    Take 1 tablet by mouth Daily. Pt. Take orange flavored chewable tablet.    ATORVASTATIN (LIPITOR) 40 MG TABLET    TAKE 1 TABLET BY MOUTH EVERY EVENING    CALCIUM CARBONATE/VITAMIN D3 (OS-WENDY 500 + D3 ORAL)    Take by mouth.    CHOLECALCIFEROL, VITAMIN D3, 2,000 UNIT TAB    Take 1 tablet by mouth Daily.    GLUCOSAMINE HCL/CHONDR LEARY A NA (OSTEO BI-FLEX ORAL)    Take 1 tablet by mouth once daily.     OMEGA-3S/DHA/EPA/FISH OIL (OMEGA 3 ORAL)    Take 550 mg by mouth. 1 Capsule Oral Every day    OMEPRAZOLE (PRILOSEC) 20 MG CAPSULE    TAKE 1 CAPSULE BY MOUTH TWICE DAILY    VALSARTAN (DIOVAN) 80 MG TABLET    TAKE 1 TABLET(80 MG) BY MOUTH EVERY DAY    VITAMIN E 200 UNIT CAPSULE    Take 200 Units by mouth once daily.   Discontinued Medications    ACETAMINOPHEN (TYLENOL EXTRA STRENGTH ORAL)    Take by mouth.         CARE TEAM:  Patient Care Team:  Margaret Stern MD as PCP - General (Internal Medicine)  Maximino Martell MD as Consulting Physician (Plastic Surgery)  Scotty Landin MD as Consulting Physician (Cardiology)  Seema Alejo MD as Consulting Physician (Urology)           SCREENING HISTORY:  Health Maintenance       Date Due Completion Date    TETANUS VACCINE 03/07/1943 ---    Urine Microalbumin 09/21/2017 9/21/2016    Hemoglobin A1c 01/17/2018 7/17/2017    Eye Exam 03/23/2018 3/23/2017    Foot Exam 07/10/2018 7/10/2017    Lipid Panel 07/17/2018 7/17/2017    DEXA SCAN 07/17/2019 7/17/2017    Override on 7/29/2010: Done            REVIEW OF SYSTEMS:   The patient reports good dietary habits.  The patient does not exercise regularly.  Review of Systems   Constitutional: Negative for chills, fever, malaise/fatigue and weight loss.   HENT: Negative for congestion, ear pain, nosebleeds and sore throat.    Eyes: Negative for blurred vision, pain and discharge.   Respiratory: Negative for cough, hemoptysis, shortness of breath and wheezing.    Cardiovascular: Negative for chest pain, palpitations, claudication and leg swelling.   Gastrointestinal: Negative for abdominal pain, blood in stool, constipation, diarrhea, heartburn, melena, nausea and vomiting.   Genitourinary: Negative for dysuria, frequency, hematuria and urgency.   Musculoskeletal: Negative for joint pain and myalgias.   Skin: Negative for itching and rash.   Neurological: Positive for tremors. Negative for dizziness, focal weakness,  "seizures, weakness and headaches.        - she continues with some postherpetic neuralgia pain from shingles last year in the right neck region   Endo/Heme/Allergies: Negative for polydipsia. Does not bruise/bleed easily.   Psychiatric/Behavioral: Negative for depression, memory loss, substance abuse and suicidal ideas. The patient is not nervous/anxious and does not have insomnia.                 Physical Examination:   Vitals:    02/19/18 0913   BP: 106/70   Pulse: 100   Temp: 98.7 °F (37.1 °C)     Weight: 83.7 kg (184 lb 10.2 oz)   Height: 5' 5" (165.1 cm)   Body mass index is 30.72 kg/m².    General appearance - alert, well appearing, and in no distress and obese  Mental status - alert, oriented to person, place, and time, normal mood, behavior, speech, dress, motor activity, and thought processes  Eyes - pupils equal and reactive, extraocular eye movements intact, sclera anicteric  Mouth - mucous membranes moist, pharynx normal without lesions  Neck - supple, no significant adenopathy, carotids upstroke normal bilaterally, no bruits, thyroid exam: thyroid is normal in size without nodules or tenderness  Lymphatics - no palpable lymphadenopathy  Chest - clear to auscultation, no wheezes, rales or rhonchi, symmetric air entry  Heart - normal rate and regular rhythm, no gallops noted  Abdomen - soft, nontender, nondistended, no masses or organomegaly  Back exam - mild limited range of motion, no pain with motion noted during exam  Neurological - alert, oriented, normal speech, no focal findings noted, cranial nerves II through XII intact, abnormal findings: tremor (chronic/stable)  Musculoskeletal - no muscular tenderness noted, Moderate osteoarthritic changes noted to both knee joints. No joint effusions noted.   Extremities - pedal edema trace +  Skin - normal coloration and turgor, no rashes, no suspicious skin lesions noted      ASSESSMENT AND PLAN:  1. Routine medical exam  Counseled on age appropriate " medical preventative services including age appropriate cancer screenings, age appropriate eye and dental exams, over all nutritional health, need for a consistent exercise regimen, and an over all push towards maintaining a vigorous and active lifestyle.  Counseled on age appropriate vaccines and discussed upcoming health care needs based on age/gender. Discussed good sleep hygiene and stress management.      2. Type 2 diabetes, controlled, with neuropathy  Diabetes currently is controlled for age and comorbid conditions. We discussed diabetic diet and regular exercise. We discussed home blood sugar monitoring, if appropriate. The current medical regimen is effective;  continue present plan and medications. Neuropathy pain controlled.   - Microalbumin/creatinine urine ratio; Future  - Hemoglobin A1c; Future    3. Benign hypertension with chronic kidney disease, stage III  Discussed sodium restriction, maintaining ideal body weight and regular exercise program as physiologic means to achieve blood pressure control. The patient will strive towards this. The current medical regimen is effective;  continue present plan and medications. Recommended patient to check home readings to monitor and see me for followup as scheduled or sooner as needed. Patient was educated that both decongestant and anti-inflammatory medication may raise blood pressure. Stable kidney function. Observe. Patient counseled to avoid/minimize the use of anti-inflammatory  Medication. Discussed to stay well hydrated. Also discussed with patient that good control of blood pressure or diabetes, if present, will help to prevent progression.   - PTH, intact; Future    4. Hyperlipidemia, unspecified hyperlipidemia type  We discussed low fat diet and regular exercise.The current medical regimen is effective;  continue present plan and medications.      5. Gastroesophageal reflux disease without esophagitis  Symptoms controlled. Reflux precautions  discussed (eliminate tobacco if a smoker; minimize caffeine, chocolate and red/white peppermint intake; avoid heavy and spicy meals; don't lay down within 2-3 hours after eating). Medication as needed. Patient asked to take medication breaks, if possible - discussed chronic use can limit calcium absorption, increases the risk for intestinal infections, and can cause kidney damage. There are also some newer studies that show possible increased risk of mortality.     6. Peripheral vascular disease  Stable. Asymptomatic. Observe.     7. Osteoporosis, postmenopausal  We discussed adequate calcium and vitamin D supplementation. We discussed fall precautions. She is up to date on her BMD. She will be due for Reclast infusion 8/2018     8. Essential tremor  Stable. Observe.    9. Atherosclerosis of abdominal aorta/10. Stenosis of abdominal aorta  Patient with Atherosclerosis of the Aorta.  Stable/asymptomatic. Currently stable on lipid lowering medication and b/p monitoring.     11. Post herpetic neuralgia  Her symptoms have been slightly persistent but stable.  Pain is intermittent.  She does not wish to take any gabapentin at this time.  Tylenol as needed.    12. Overweight (BMI 25.0-29.9)  The patient is asked to make an attempt to improve diet and exercise patterns to aid in medical management of this problem.     13. Need for Tdap vaccination  Patient was advised to get immunization at the pharmacy.           Follow-up in about 6 months (around 8/19/2018), or if symptoms worsen or fail to improve, for follow up chronic medical conditions.. or sooner as needed.

## 2018-02-26 ENCOUNTER — TELEPHONE (OUTPATIENT)
Dept: OPTOMETRY | Facility: CLINIC | Age: 83
End: 2018-02-26

## 2018-02-26 NOTE — TELEPHONE ENCOUNTER
Member Name: DELMI SAMPLE  Member ID: [id number]  Social Security Number: [id number]  YOB: 1925  Address: [address] [place] [address]  [place] [address]  Phone Number: [phone]  Gender: Female  Responsible Member: DELMI GONZALEZ    Network: [id number]  Group: [employer] Exam [employer] ([id number])  Benefit Level: 775  Service Eligibility  If you have more than one location under your User ID, choose a location then use the drop-down menu to choose the provider who is rendering services.     From the tabs below, select the type of benefit you will be providing, then check the box(es) next to the applicable service(s). You will not receive an authorization for this member. Instead, simply click Submit Claim to start the claim process.    Routine refers to routine vision benefits, including eye exams, lenses, frames and contact lenses.   Medical refers to benefits for medical eye care services, including diabetic eye care.   Additional Purchases will calculate member payments on additional pairs of glasses and other materials members receive discounts on so you can determine member out-of-pocket costs.  To learn more, download our Member Benefits Display Job Aid.          [facility] [address] ([id number])  Provider   Date of Service: 02/27/2018  Routine   is Eligible? Member Eligible As Of*   Exam Yes 01/01/2017   Lenses Yes 01/01/2017   Frames Yes 01/01/2017   Contact Lenses Yes 01/01/2017  Submit Claim  You will not get an authorization for this member; simply click Submit Claim to start the claim process.         Where did my authorization button go? Click here    Where is the reimbursement button? Click here    Click here to learn more about migrated members.          This information is based on data available in our system today and does not take into account future changes to the member's plan. Please verify eligibility immediately prior to receiving services.    Service  Restrictions  Plan allows member to receive Exam only services with discount on materials. Frame, Lens, and lens options must be purchased in the same transaction to receive the full discount. If purchased separately, members receive 20% off retail price  Related Members  Below are other members covered under the same subscriber ID. Select the member name to start a claim.    Member Name Group Name Member ID SS#   SAMPLE, D [employer] [id number] [phone]  Showing 1 family member  Member Benefits  Select the link below to view benefit details for this member. (Your browser must be java script-enabled to use this function.)    Note: you will not receive an authorization for this member. To complete the claim later, select Claims from the navigation window.     Hide Benefits    Routine In Network  Exam Services   Exam with Dilation as Necessary $0 Copay  Frames   Frame 40% off Retail Price  Lenses   Single Vision $50  Bifocal $70  Trifocal $105  Lenticular Single Vision 20% off Retail Price  Progressive - Standard $135  Progressive - Premium 20% off Retail Price  Lens Options   Anti Reflective Coating - Standard $45  Anti Reflective Coating - Premium 20% off Retail Price  Polycarbonate - Standard $40  Scratch Coating - Standard Plastic $15  Tint - Solid or Gradient $15  UV Treatment $15  All Other Lens Options 20% off Retail Price  Contact Lenses   Contacts - Conventional 15% off Retail Price  Contacts - Disposable 100% of Retail Price  Benefits are not provided for services or materials arising from:    Orthoptic or vision training, subnormal vision aids and any associated supplemental testing  Aniseikonic lenses  Medical and/or surgical treatment of the eye, eyes or supporting structures  Any eye or vision examination, or any corrective eyewear required by a policyholder as a condition of employment  Safety eyewear  Services provided as a result of any Workers' Compensation law, or  similar legislation, or required by any governmental agency or program whether federal, state or subdivisions thereof  [place] (non-prescription) lenses and/or contact lenses  Non-prescription sunglasses  Two pair of glasses in lieu of bifocals  Services or materials provided by any other group benefit plan providing vision care  Services rendered after the date an insured person ceases to be covered under the policy, except when vision materials ordered before coverage ended are delivered, and the services rendered to the Insured person are within 31 days from the date of such order  Replacement of lost or broken lenses, frames, glasses or contact lenses except in the next benefit frequency when vision materials would next become available  Discounts on certain brand name vision materials in which the  imposes a no-discount practice  Benefits may not be combined with any discount, promotional offering or other group benefit plans. Benefit allowances provide no remaining balance for future use within the same benefit frequency.    Patient Financial Responsibility  [employer] recommends you print this page and have the patient sign and date the below acknowledgement. The document can be kept in the patient's file.    I hereby authorize this vision care provider to apply for benefits on my behalf for covered services rendered by them. I also assign my benefits and request that all payments from [facility] made directly to the vision care provider. I agree to assume responsibility for full payment pending any remaining balance that is not covered by [employer].    I certify that the information I have reported with regard to my coverage is correct. I further authorize vision care provider to release to [employer] and its agents any information related to this or any related claim.

## 2018-02-27 ENCOUNTER — OFFICE VISIT (OUTPATIENT)
Dept: OPTOMETRY | Facility: CLINIC | Age: 83
End: 2018-02-27
Payer: COMMERCIAL

## 2018-02-27 DIAGNOSIS — H52.12 MYOPIA, LEFT EYE: ICD-10-CM

## 2018-02-27 DIAGNOSIS — Z01.00 EYE EXAM, ROUTINE: Primary | ICD-10-CM

## 2018-02-27 PROCEDURE — 99999 PR PBB SHADOW E&M-EST. PATIENT-LVL III: CPT | Mod: PBBFAC,,, | Performed by: OPTOMETRIST

## 2018-02-27 PROCEDURE — 92015 DETERMINE REFRACTIVE STATE: CPT | Mod: S$GLB,,, | Performed by: OPTOMETRIST

## 2018-02-27 PROCEDURE — 92014 COMPRE OPH EXAM EST PT 1/>: CPT | Mod: S$GLB,,, | Performed by: OPTOMETRIST

## 2018-02-27 NOTE — PROGRESS NOTES
HPI     DLS:  4/6/17 Dr Faulkner, 4/6/17 Dr Colon  Annual [facility] exam  S/p phaco w/IOL OU   Macular hole right eye per pt    No eyedrops    Pt here for check of ocular health.  Pt denies flashes or floaters.  Pt   states FBS and itching OU.  Pt denies tearing or burning.  Pt denies eye   pain.    Hemoglobin A1C       Date                     Value               Ref Range             Status                02/19/2018               6.0 (H)             4.0 - 5.6 %           Final                    07/17/2017               5.9 (H)             4.0 - 5.6 %           Final                    08/18/2016               6.0                 4.5 - 6.2 %           Final                  Last edited by Yimi Colon, OD on 2/27/2018 10:53 AM. (History)            Assessment /Plan     For exam results, see Encounter Report.    Eye exam, routine  -Annual [facility] exam  -No diabetic retinopathy noted today.  Continued control with primary care physician and annual comprehensive eye exam.  -Macular hole right    Myopia, left eye  Eyeglass Final Rx     Eyeglass Final Rx       Sphere Cylinder Axis Dist VA Add    Right Balance Sphere       Left -0.50 +0.50 040 20/30 +3.00    Type:  Bifocal    Expiration Date:  2/28/2019                  RTC 1 yr

## 2018-03-19 DIAGNOSIS — I10 ESSENTIAL HYPERTENSION: Chronic | ICD-10-CM

## 2018-03-19 RX ORDER — VALSARTAN 80 MG/1
TABLET ORAL
Qty: 90 TABLET | Refills: 0 | Status: SHIPPED | OUTPATIENT
Start: 2018-03-19 | End: 2018-06-14 | Stop reason: SDUPTHER

## 2018-03-27 RX ORDER — OMEPRAZOLE 20 MG/1
CAPSULE, DELAYED RELEASE ORAL
Qty: 180 CAPSULE | Refills: 1 | Status: SHIPPED | OUTPATIENT
Start: 2018-03-27 | End: 2019-04-11 | Stop reason: SDUPTHER

## 2018-04-16 RX ORDER — ATORVASTATIN CALCIUM 40 MG/1
TABLET, FILM COATED ORAL
Qty: 90 TABLET | Refills: 3 | Status: SHIPPED | OUTPATIENT
Start: 2018-04-16 | End: 2019-04-11 | Stop reason: SDUPTHER

## 2018-06-14 DIAGNOSIS — I10 ESSENTIAL HYPERTENSION: Chronic | ICD-10-CM

## 2018-06-14 RX ORDER — VALSARTAN 80 MG/1
TABLET ORAL
Qty: 90 TABLET | Refills: 0 | Status: SHIPPED | OUTPATIENT
Start: 2018-06-14 | End: 2018-07-24

## 2018-06-24 ENCOUNTER — HOSPITAL ENCOUNTER (EMERGENCY)
Facility: HOSPITAL | Age: 83
Discharge: HOME | End: 2018-06-25
Attending: EMERGENCY MEDICINE
Payer: MEDICARE

## 2018-06-24 VITALS
OXYGEN SATURATION: 97 % | HEIGHT: 65 IN | HEART RATE: 94 BPM | RESPIRATION RATE: 18 BRPM | DIASTOLIC BLOOD PRESSURE: 75 MMHG | BODY MASS INDEX: 29.82 KG/M2 | WEIGHT: 179 LBS | TEMPERATURE: 99 F | SYSTOLIC BLOOD PRESSURE: 174 MMHG

## 2018-06-24 DIAGNOSIS — I82.412 DVT OF DEEP FEMORAL VEIN, LEFT: Primary | ICD-10-CM

## 2018-06-24 DIAGNOSIS — R00.0 TACHYCARDIA: ICD-10-CM

## 2018-06-24 DIAGNOSIS — M79.662 PAIN OF LEFT CALF: ICD-10-CM

## 2018-06-24 LAB
ALBUMIN SERPL BCP-MCNC: 3.6 G/DL
ALP SERPL-CCNC: 83 U/L
ALT SERPL W/O P-5'-P-CCNC: 18 U/L
ANION GAP SERPL CALC-SCNC: 13 MMOL/L
AST SERPL-CCNC: 40 U/L
BASOPHILS # BLD AUTO: 0.02 K/UL
BASOPHILS NFR BLD: 0.3 %
BILIRUB SERPL-MCNC: 1.2 MG/DL
BUN SERPL-MCNC: 19 MG/DL
CALCIUM SERPL-MCNC: 9.7 MG/DL
CHLORIDE SERPL-SCNC: 106 MMOL/L
CO2 SERPL-SCNC: 20 MMOL/L
CREAT SERPL-MCNC: 1 MG/DL
DIFFERENTIAL METHOD: ABNORMAL
EOSINOPHIL # BLD AUTO: 0.4 K/UL
EOSINOPHIL NFR BLD: 5.3 %
ERYTHROCYTE [DISTWIDTH] IN BLOOD BY AUTOMATED COUNT: 13.4 %
EST. GFR  (AFRICAN AMERICAN): 56 ML/MIN/1.73 M^2
EST. GFR  (NON AFRICAN AMERICAN): 49 ML/MIN/1.73 M^2
GLUCOSE SERPL-MCNC: 88 MG/DL
HCT VFR BLD AUTO: 43.8 %
HGB BLD-MCNC: 14.6 G/DL
LACTATE SERPL-SCNC: 1 MMOL/L
LYMPHOCYTES # BLD AUTO: 1.7 K/UL
LYMPHOCYTES NFR BLD: 24.9 %
MCH RBC QN AUTO: 31.8 PG
MCHC RBC AUTO-ENTMCNC: 33.3 G/DL
MCV RBC AUTO: 95 FL
MONOCYTES # BLD AUTO: 1 K/UL
MONOCYTES NFR BLD: 14 %
NEUTROPHILS # BLD AUTO: 3.9 K/UL
NEUTROPHILS NFR BLD: 55.2 %
PLATELET # BLD AUTO: 149 K/UL
PMV BLD AUTO: 10 FL
POTASSIUM SERPL-SCNC: 4.7 MMOL/L
PROT SERPL-MCNC: 7.7 G/DL
RBC # BLD AUTO: 4.59 M/UL
SODIUM SERPL-SCNC: 139 MMOL/L
WBC # BLD AUTO: 7 K/UL

## 2018-06-24 PROCEDURE — 87040 BLOOD CULTURE FOR BACTERIA: CPT

## 2018-06-24 PROCEDURE — 99284 EMERGENCY DEPT VISIT MOD MDM: CPT | Mod: 25

## 2018-06-24 PROCEDURE — 96360 HYDRATION IV INFUSION INIT: CPT

## 2018-06-24 PROCEDURE — 25500020 PHARM REV CODE 255: Performed by: EMERGENCY MEDICINE

## 2018-06-24 PROCEDURE — 85025 COMPLETE CBC W/AUTO DIFF WBC: CPT

## 2018-06-24 PROCEDURE — 63600175 PHARM REV CODE 636 W HCPCS: Performed by: EMERGENCY MEDICINE

## 2018-06-24 PROCEDURE — 80053 COMPREHEN METABOLIC PANEL: CPT

## 2018-06-24 PROCEDURE — 83605 ASSAY OF LACTIC ACID: CPT

## 2018-06-24 PROCEDURE — 96372 THER/PROPH/DIAG INJ SC/IM: CPT | Mod: 59

## 2018-06-24 PROCEDURE — 25000003 PHARM REV CODE 250: Performed by: EMERGENCY MEDICINE

## 2018-06-24 PROCEDURE — 96361 HYDRATE IV INFUSION ADD-ON: CPT

## 2018-06-24 RX ORDER — ENOXAPARIN SODIUM 100 MG/ML
1 INJECTION SUBCUTANEOUS
Status: COMPLETED | OUTPATIENT
Start: 2018-06-24 | End: 2018-06-24

## 2018-06-24 RX ADMIN — RIVAROXABAN 15 MG: 15 TABLET, FILM COATED ORAL at 11:06

## 2018-06-24 RX ADMIN — ENOXAPARIN SODIUM 80 MG: 100 INJECTION SUBCUTANEOUS at 08:06

## 2018-06-24 RX ADMIN — SODIUM CHLORIDE 1000 ML: 0.9 INJECTION, SOLUTION INTRAVENOUS at 07:06

## 2018-06-24 RX ADMIN — IOHEXOL 75 ML: 350 INJECTION, SOLUTION INTRAVENOUS at 09:06

## 2018-06-24 NOTE — ED PROVIDER NOTES
Encounter Date: 6/24/2018    SORT:  89 y.o. female presenting to ED for L calf pain extending to L groin. Hx of DVTs that are similar to current symptoms per patient. Denies numbness and fever. Denies CP and SOB. Not on anticoagulant and denies [place] filter. Limited triage exam:  Non-toxic. If orders were placed, they are pending.     Isrrael Ceballos PA-C  06/24/2018  6:42 PM   SCRIBE #1 NOTE: I, Mitesh Wilson, am scribing for, and in the presence of,  Kyle Spears MD. I have scribed the following portions of the note - Other sections scribed: HPI/ROS/PE.       History     Chief Complaint   Patient presents with    Leg Swelling     that started in her left foot and is now swollen up leg to groin area. HAs hx of blood clots. Denies SOB or CP     CC: Leg Swelling     HPI: This 89 y.o. female with a medical hx of DVT, HTN, HLD, CKD, and varicose veins presents to the ED for an evaluation of acute onset, moderate (6/10) LLE swelling with pain that started 5 days ago. Pt states the swelling initially started to the L foot. Swelling gradually increased. For pain, she attempted tx with 1 tablet of Tylenol. She has a hx of blood clots in LLE. She notes that she was on Xarelto but was terminated at the beginning of 2017. She does take Aspirin 1x/day. No alleviating factors. Otherwise, pt denies fever, chills, numbness, weakness, N/V/D, abdominal pain, chest pain, SOB, and any other associated symptoms.       The history is provided by the patient. No  was used.     Review of patient's allergies indicates:   Allergen Reactions    Inderal [propranolol]     Indomethacin sodium      Other reaction(s): Vomiting  Other reaction(s): Nausea     Past Medical History:   Diagnosis Date    Atherosclerosis of abdominal aorta     noted on CT scan 10/27/2015    Back pain     BPPV (benign paroxysmal positional vertigo)     Cancer     skin cancer    Chronic kidney disease, stage III (moderate)     Dependent  edema     Diabetes with neurologic complications     DVT (deep venous thrombosis)     Essential tremor     Hearing loss     with hearing aides 1/2014    History of shingles 2017    Hyperlipidemia LDL goal <100     Hypertension     Left posterior capsular opacification 3/23/2017    Macular degeneration of left eye     Macular hole     od    Mild carotid artery disease     Mild hyperparathyroidism     Osteoarthritis     Osteoporosis, postmenopausal     Overweight(278.02)     Peripheral vascular disease     Polymyalgia rheumatica     followed by rheumatology, Dr. Mendez    Stenosis of abdominal aorta     noted on CT scan 10/27/2015; probable high-grade stenosis at the origin of the SMA and left renal arteries    Type II or unspecified type diabetes mellitus without mention of complication, not stated as uncontrolled     Urinary incontinence     Varicose veins      Past Surgical History:   Procedure Laterality Date    bilateral cataract surgery      bilateral leg vein dilation      CATARACT EXTRACTION      ou    left knee arthroscopy      left macular degeneration eye surgery      right knee surgery      SKIN CANCER EXCISION  2002, 2017    Basil Cell; face    TOTAL ABDOMINAL HYSTERECTOMY      YAG Laser Capsulotomy Left 03/23/2017    Dr. Faulkner     Family History   Problem Relation Age of Onset    Hypertension Unknown     Edema Unknown     Breast cancer Sister     No Known Problems Mother     No Known Problems Father     No Known Problems Brother     No Known Problems Maternal Aunt     No Known Problems Maternal Uncle     No Known Problems Paternal Aunt     No Known Problems Paternal Uncle     No Known Problems Maternal Grandmother     No Known Problems Maternal Grandfather     No Known Problems Paternal Grandmother     No Known Problems Paternal Grandfather     Amblyopia Neg Hx     Blindness Neg Hx     Cataracts Neg Hx     Diabetes Neg Hx     Glaucoma Neg Hx      Macular degeneration Neg Hx     Retinal detachment Neg Hx     Strabismus Neg Hx     Stroke Neg Hx     Thyroid disease Neg Hx     Cancer Neg Hx      Social History   Substance Use Topics    Smoking status: Never Smoker    Smokeless tobacco: Never Used    Alcohol use No     Review of Systems   Constitutional: Negative for diaphoresis, fatigue and fever.   HENT: Negative for nosebleeds, sinus pain, sore throat and voice change.    Eyes: Negative for pain and redness.   Respiratory: Negative for cough, shortness of breath and wheezing.    Cardiovascular: Positive for leg swelling (L). Negative for chest pain.   Gastrointestinal: Negative for abdominal distention, abdominal pain, blood in stool and vomiting.   Genitourinary: Negative for dysuria, flank pain and hematuria.   Musculoskeletal: Negative for joint swelling and neck stiffness.   Skin: Negative for rash and wound.   Neurological: Negative for syncope, weakness, numbness and headaches.   Psychiatric/Behavioral: Negative for confusion. The patient is not nervous/anxious.    All other systems reviewed and are negative.      Physical Exam     Initial Vitals [06/24/18 1842]   BP Pulse Resp Temp SpO2   (!) 153/83 97 18 98.8 °F (37.1 °C) 97 %      MAP       --         Physical Exam    Nursing note and vitals reviewed.  Constitutional: She appears well-developed and well-nourished. She is not diaphoretic. No distress.   HENT:   Head: Normocephalic and atraumatic.   Eyes: Conjunctivae and EOM are normal. Pupils are equal, round, and reactive to light.   Neck: Normal range of motion. Neck supple.   Cardiovascular: Regular rhythm. Tachycardia present.    Neurovascularly intact.   Pulmonary/Chest: Breath sounds normal. No stridor. No respiratory distress. She has no wheezes.   Abdominal: Soft. Bowel sounds are normal. She exhibits no distension. There is no tenderness. There is no rebound and no guarding.   Musculoskeletal: Normal range of motion.   +2 LLE edema to  the distal knee with tenderness and erythema to the proximal ankle. L calf tenderness.   Neurological: She is alert and oriented to person, place, and time. She has normal strength.   Skin: Skin is warm and dry.   Psychiatric: She has a normal mood and affect. Her behavior is normal.         ED Course   Procedures  Labs Reviewed   CBC W/ AUTO DIFFERENTIAL - Abnormal; Notable for the following:        Result Value    MCH 31.8 (*)     Platelets 149 (*)     All other components within normal limits   COMPREHENSIVE METABOLIC PANEL - Abnormal; Notable for the following:     CO2 20 (*)     Total Bilirubin 1.2 (*)     eGFR if  56 (*)     eGFR if non  49 (*)     All other components within normal limits   CULTURE, BLOOD   CULTURE, BLOOD   LACTIC ACID, PLASMA          Imaging Results          CTA Chest Non-Coronary (PE Study) (Final result)  Result time 06/24/18 22:21:06    Final result by Cornel Torres MD (06/24/18 22:21:06)                 Impression:      1. No evidence of PE.  2. Chronic appearing lung changes with scarring and chronic atelectasis.  Mild interlobular septal thickening and minimal ground-glass attenuation which could reflect mild edema.  3. Moderate size hiatal hernia.  4. Severe aortic atherosclerosis and mild coronary artery calcification.  5. Additional findings as detailed above including two subcentimeter pulmonary nodules and stable indeterminate right adrenal nodule.      Electronically signed by: Cornel Torres MD  Date:    06/24/2018  Time:    22:21             Narrative:    EXAMINATION:  CTA CHEST NON CORONARY    CLINICAL HISTORY:  Chest pain, acute, PE suspected, high pretest prob;    TECHNIQUE:  Low dose axial images, sagittal and coronal reformations were obtained from the thoracic inlet to the lung bases following the IV administration of 100 mL of Omnipaque 350.  Contrast timing was optimized to evaluate the pulmonary arteries.  MIP images were  performed.    COMPARISON:  CT abdomen and pelvis from August 2016.    FINDINGS:  Structures at the base of the neck are unremarkable.  Aorta is non-aneurysmal.  There is severe aortic atherosclerosis.  Aortic valve and coronary artery calcification is also seen.  The heart is normal in size without pericardial effusion.  No intraluminal filling defects within the pulmonary arteries to suggest pulmonary thromboembolism.   There is no evidence of mediastinal, axillary, or hilar lymph node enlargement.  The esophagus maintains a normal course and caliber, noting presence of moderate-sized hiatal hernia.    The trachea and bronchi are patent.  The lungs are symmetrically expanded.  Mild bilateral scarring and fibrotic changes are seen.  Scarring is seen at the lung apices.  There is mild interlobular septal thickening.  There is mild mosaic alteration of lung density.  5 mm nodule is visualized within the lateral aspect of the right upper lobe.  Additional 5 mm nodule is visualized within the left lower lobe.  (For multiple solid nodules all <6 mm, Fleischner Society 2017 guidelines recommend no routine follow up for a low risk patient, or follow up with non-contrast chest CT at 12 months after discovery in a high risk patient.)    There is stable indeterminate right adrenal nodule measuring 1.6 cm.  Osseous structures and extrathoracic soft tissues are unremarkable.                               X-Ray Chest PA And Lateral (Final result)  Result time 06/24/18 20:20:27    Final result by Montana Liu MD (06/24/18 20:20:27)                 Impression:      As above.      Electronically signed by: Montana Liu MD  Date:    06/24/2018  Time:    20:20             Narrative:    EXAMINATION:  XR CHEST PA AND LATERAL    CLINICAL HISTORY:  Tachycardia, unspecified    TECHNIQUE:  PA and lateral views of the chest were performed.    COMPARISON:  Chest radiograph and CT abdomen and pelvis 08/15/2016 and chest radiograph  07/13/2015    FINDINGS:  Cardiomediastinal silhouette appears stable.  There is bilateral diffuse nonspecific interstitial coarsening with a perihilar and basilar predominance which may reflect pulmonary edema/CHF pattern.  Bibasilar scattered linear opacities consistent with subsegmental scarring versus atelectasis.  No large consolidation, pleural effusion or pneumothorax.  Calcific atherosclerosis of the thoracic aorta.  Stable hilar regions.  Included osseous structures appear grossly stable without acute or destructive process seen.                               US Lower Extremity Veins Left (Final result)     Abnormal  Result time 06/24/18 19:56:18    Final result by Cornel Torres MD (06/24/18 19:56:18)                 Impression:      Extensive left lower extremity acute deep venous thrombosis.    This report was flagged in Epic as abnormal.      Electronically signed by: Cornel Torres MD  Date:    06/24/2018  Time:    19:56             Narrative:    EXAMINATION:  US LOWER EXTREMITY VEINS LEFT    CLINICAL HISTORY:  Pain in left lower leg    TECHNIQUE:  Duplex and color flow Doppler evaluation of the bilateral lower extremity veins was performed.    COMPARISON:  February 2017.    FINDINGS:  Extensive deep venous thrombosis is visualized throughout the left lower extremity involving the left common femoral, femoral, popliteal, peroneal, and posterior tibial veins.  Greater saphenous vein and anterior tibial veins are patent.                                 Medical Decision Making:   ED Management:  89-year-old female with complaint of left calf pain and swelling. Patient has history of DVT in the past had taken Xarelto in the past.  Ultrasound showed DVT the in the left lower extremity.  CT chest was negative Discussed with patient about possible admission to start Coumadin therapy.  Patient however refused and elect to take Xarelto instead.  1st dose of xarelto to was given at ED with prescription for  Xarelto for the next 21 day.             Scribe Attestation:   Scribe #1: I performed the above scribed service and the documentation accurately describes the services I performed. I attest to the accuracy of the note.    Attending Attestation:           Physician Attestation for Scribe:  Physician Attestation Statement for Scribe #1: I, Kyle Spears MD, reviewed documentation, as scribed by Mitesh Wilson in my presence, and it is both accurate and complete.                    Clinical Impression:   The primary encounter diagnosis was DVT of deep femoral vein, left. Diagnoses of Pain of left calf and Tachycardia were also pertinent to this visit.                             Kyle Spears MD  06/24/18 [id number]

## 2018-06-24 NOTE — ED TRIAGE NOTES
89 y.o female presents to the ED w/ daughter via personal transport. She reports two days ago she began noticing swelling on her left foot. Reports the swelling gradually went into her upper, inner thigh. She reports pain in her left calf. She denies recent trauma in her left leg but reports her left knee has been bothering her for some time. She denies any recent fever. She has a hx of DVT in her left leg 3 years ago which she was admitted for. She is AAOX4, ND noted.

## 2018-06-25 NOTE — DISCHARGE INSTRUCTIONS
Take medication as prescribed.  Follow up with the PCP in 2-3 days for further evaluation and treatment of your DVT.  Seek medical care immediately if you experience chest pain with trouble breathing, fast heartbeat, feeling dizzy or not had similar chest pain before.    Return immediately if symptoms worsens or if you are unable to follow up with you primary care provider.

## 2018-06-28 ENCOUNTER — OFFICE VISIT (OUTPATIENT)
Dept: FAMILY MEDICINE | Facility: CLINIC | Age: 83
End: 2018-06-28
Payer: MEDICARE

## 2018-06-28 VITALS
OXYGEN SATURATION: 97 % | TEMPERATURE: 98 F | DIASTOLIC BLOOD PRESSURE: 68 MMHG | HEART RATE: 105 BPM | SYSTOLIC BLOOD PRESSURE: 120 MMHG | HEIGHT: 66 IN | WEIGHT: 176.69 LBS | BODY MASS INDEX: 28.4 KG/M2

## 2018-06-28 DIAGNOSIS — N25.81 SECONDARY HYPERPARATHYROIDISM OF RENAL ORIGIN: ICD-10-CM

## 2018-06-28 DIAGNOSIS — Z86.718 HISTORY OF DVT (DEEP VEIN THROMBOSIS): Chronic | ICD-10-CM

## 2018-06-28 DIAGNOSIS — Z09 HOSPITAL DISCHARGE FOLLOW-UP: Primary | ICD-10-CM

## 2018-06-28 DIAGNOSIS — E78.5 HYPERLIPIDEMIA, UNSPECIFIED HYPERLIPIDEMIA TYPE: Chronic | ICD-10-CM

## 2018-06-28 DIAGNOSIS — E11.40 TYPE 2 DIABETES, CONTROLLED, WITH NEUROPATHY: ICD-10-CM

## 2018-06-28 PROCEDURE — 99999 PR PBB SHADOW E&M-EST. PATIENT-LVL IV: CPT | Mod: PBBFAC,,, | Performed by: NURSE PRACTITIONER

## 2018-06-28 PROCEDURE — 99214 OFFICE O/P EST MOD 30 MIN: CPT | Mod: S$GLB,,, | Performed by: NURSE PRACTITIONER

## 2018-06-28 NOTE — PROGRESS NOTES
Subjective:       Patient ID: Patito Mcintyre is a 89 y.o. female.    Chief Complaint: Hospital Follow Up    89 year old female presents to the clinic for a ER hospital follow up. She presented to the ER with acute onset, moderate 6/10 LLL swelling with pain that started 5 days ago. Patient states the swelling initially started to the left foot and gradually up the left lower leg. For pain, she attempted a Tylenol. She has a history of blood clots in LLE. She notes that she was on Xarelto but was terminated at the beginning of 2017. She does take Aspirin one daily. She has no alleviating factors. Otherwise, patient denies fever, chills, numbness, weakness, nausea, vomiting, diarrhea, abdominal pain, chest pain, sob, or any other associated symptoms. Ultrasound showed DVT in the left lower extremity. CT chest was negative. She was started on Xarelto. She says she is still having a lot of swelling in left lower leg but the pain is getting much better. She denies any chest pain or sob. She is tolerating the Xarelto without any difficulty.       Past Medical History:   Diagnosis Date    Atherosclerosis of abdominal aorta     noted on CT scan 10/27/2015    Back pain     BPPV (benign paroxysmal positional vertigo)     Cancer     skin cancer    Chronic kidney disease, stage III (moderate)     Dependent edema     Diabetes with neurologic complications     DVT (deep venous thrombosis)     Essential tremor     Hearing loss     with hearing aides 1/2014    History of shingles 2017    Hyperlipidemia LDL goal <100     Hypertension     Left posterior capsular opacification 3/23/2017    Macular degeneration of left eye     Macular hole     od    Mild carotid artery disease     Mild hyperparathyroidism     Osteoarthritis     Osteoporosis, postmenopausal     Overweight(278.02)     Peripheral vascular disease     Polymyalgia rheumatica     followed by rheumatology, Dr. Mendez    Stenosis of abdominal aorta      noted on CT scan 10/27/2015; probable high-grade stenosis at the origin of the SMA and left renal arteries    Type II or unspecified type diabetes mellitus without mention of complication, not stated as uncontrolled     Urinary incontinence     Varicose veins      Past Surgical History:   Procedure Laterality Date    bilateral cataract surgery      bilateral leg vein dilation      CATARACT EXTRACTION      ou    left knee arthroscopy      left macular degeneration eye surgery      right knee surgery      SKIN CANCER EXCISION  2002, 2017    Basil Cell; face    TOTAL ABDOMINAL HYSTERECTOMY      YAG Laser Capsulotomy Left 03/23/2017    Dr. Faulkner      reports that she has never smoked. She has never used smokeless tobacco. She reports that she does not drink alcohol or use drugs.  Review of Systems   Respiratory: Negative for cough, shortness of breath and wheezing.    Cardiovascular: Positive for leg swelling. Negative for chest pain and palpitations.   Gastrointestinal: Negative for abdominal pain, blood in stool, constipation, diarrhea, nausea and vomiting.   Musculoskeletal: Negative for gait problem.   Skin: Negative for rash.   Neurological: Negative for dizziness, light-headedness and headaches.       Objective:      Physical Exam   Constitutional: She is oriented to person, place, and time. She appears well-developed and well-nourished. No distress.   Eyes: Conjunctivae and EOM are normal. Pupils are equal, round, and reactive to light. Right eye exhibits no discharge. Left eye exhibits no discharge. No scleral icterus.   Neck: Normal range of motion. Neck supple. No JVD present.   Cardiovascular: Normal rate, regular rhythm and normal heart sounds.    No murmur heard.  Pulmonary/Chest: Effort normal and breath sounds normal. No respiratory distress. She has no wheezes. She has no rales.   Abdominal: Soft. Bowel sounds are normal. There is no tenderness.   Musculoskeletal: Normal range of motion.  She exhibits edema.   Plus 2 swelling noted to left lower extremity    Neurological: She is alert and oriented to person, place, and time.   Skin: Skin is warm and dry. She is not diaphoretic.   Psychiatric: She has a normal mood and affect.       Assessment:       1. Hospital discharge follow-up    2. History of DVT (deep vein thrombosis)    3. Type 2 diabetes, controlled, with neuropathy    4. Hyperlipidemia, unspecified hyperlipidemia type    5. Secondary hyperparathyroidism of renal origin        Plan:         Hospital discharge follow-up    History of DVT (deep vein thrombosis)  -     Ambulatory referral to Hematology / Oncology  - continue Xarelto     Type 2 diabetes, controlled, with neuropathy  - diet controlled    Hyperlipidemia, unspecified hyperlipidemia type  - The current medical regimen is effective;  continue present plan and medications.    Secondary hyperparathyroidism of renal origin  - stable observe asymptomatic

## 2018-06-29 LAB
BACTERIA BLD CULT: NORMAL
BACTERIA BLD CULT: NORMAL

## 2018-07-03 ENCOUNTER — TELEPHONE (OUTPATIENT)
Dept: FAMILY MEDICINE | Facility: CLINIC | Age: 83
End: 2018-07-03

## 2018-07-10 NOTE — TELEPHONE ENCOUNTER
----- Message from Madelin Zamorano sent at 7/10/2018  8:37 AM CDT -----  Contact: Daughter - Johana  Patient's daughter says she was given Xarelto in the hospital but she will be out of medication before she can be seen and would like to know if she can get a refill on it. Please call at [phone].    [facility] ([facility]ARELTO) [facility] [address] [place] [address], [place] - [address] AT [facility] [address]

## 2018-07-24 ENCOUNTER — TELEPHONE (OUTPATIENT)
Dept: FAMILY MEDICINE | Facility: CLINIC | Age: 83
End: 2018-07-24

## 2018-07-24 DIAGNOSIS — I10 ESSENTIAL HYPERTENSION, BENIGN: ICD-10-CM

## 2018-07-24 DIAGNOSIS — I10 ESSENTIAL HYPERTENSION, BENIGN: Primary | ICD-10-CM

## 2018-07-24 RX ORDER — LOSARTAN POTASSIUM 50 MG/1
50 TABLET ORAL DAILY
Qty: 90 TABLET | Refills: 3 | Status: CANCELLED | OUTPATIENT
Start: 2018-07-24 | End: 2019-07-24

## 2018-07-24 RX ORDER — LOSARTAN POTASSIUM 50 MG/1
50 TABLET ORAL DAILY
Qty: 90 TABLET | Refills: 3 | Status: SHIPPED | OUTPATIENT
Start: 2018-07-24 | End: 2019-07-12 | Stop reason: SDUPTHER

## 2018-07-24 NOTE — TELEPHONE ENCOUNTER
----- Message from Vivien Alicea sent at 7/24/2018 12:06 PM CDT -----  Contact: Pharmacy  \[facility] [place] [facility]\""rtan Recall     Pt number : [phone]    James juan jose Arana Rhonda: [phone].

## 2018-07-24 NOTE — TELEPHONE ENCOUNTER
Spoke w/pharmicist states pt is completely of medication. States pt needs a new rx due to valsartan recall.

## 2018-07-25 NOTE — TELEPHONE ENCOUNTER
Spoke with the pt, I informed her that the on call doctor did replace the valsartan.  Pt states she already  the medication.  Patient verbalized understandings.

## 2018-07-31 NOTE — PROGRESS NOTES
Chief Complaint :    Hx of DVT    Hx of Present illness :  Patient is a 89 y.o. year old female who presents to the clinic today for   Oncology evaluation. Recent Dx of DVT  Left  lower extremity. Noticed swelling right leg, led to Dx. Of DVT.  No recent Hx of surgery, injury, long distance travel      Allergies :    Review of patient's allergies indicates:   Allergen Reactions    Inderal [propranolol]     Indomethacin sodium      Other reaction(s): Vomiting  Other reaction(s): Nausea       Occupation :  homemaker    Transfusion :  None    Menstrual & obstetric Hx :  4, para 3.  Age of menarche: 12  Age of first pregnancy:  20  Lactation history:  Yes  Age of menopause:  Hysterectomy  Age 60.  HRT: Does not remember    Present Meds :   Medication List with Changes/Refills   Current Medications    ACETAMINOPHEN (TYLENOL ORAL)    Take 1 tablet by mouth as needed.    ASPIRIN (ECOTRIN) 81 MG EC TABLET    Take 1 tablet by mouth Daily. Pt. Take orange flavored chewable tablet.    ATORVASTATIN (LIPITOR) 40 MG TABLET    TAKE 1 TABLET BY MOUTH EVERY EVENING    CALCIUM CARBONATE/VITAMIN D3 (OS-WENDY 500 + D3 ORAL)    Take by mouth.    CHOLECALCIFEROL, VITAMIN D3, 2,000 UNIT TAB    Take 1 tablet by mouth Daily.    GLUCOSAMINE HCL/CHONDR LEARY A NA (OSTEO BI-FLEX ORAL)    Take 1 tablet by mouth once daily.    LOSARTAN (COZAAR) 50 MG TABLET    Take 1 tablet (50 mg total) by mouth once daily.    OMEGA-3S/DHA/EPA/FISH OIL (OMEGA 3 ORAL)    Take 550 mg by mouth. 1 Capsule Oral Every day    OMEPRAZOLE (PRILOSEC) 20 MG CAPSULE    TAKE 1 CAPSULE BY MOUTH TWICE DAILY    RIVAROXABAN (XARELTO) 15 MG TAB    Take 1 tablet (15 mg total) by mouth 2 (two) times daily with meals.    VITAMIN E 200 UNIT CAPSULE    Take 200 Units by mouth once daily.       Past Medical Hx :   Recent Dx of DVT involving the  Left lower extremity. Hyperlipidemia; hypertension; past Hx of shingles; CKD 3; DM with neurologic complications. Macular degeneration.  Mild carotid artery disease.  Chronic back pain. Benign vertigo. Has had two p[revious episodes of DVT involving the left lower extremity    Past Medical Hx :  Past Medical History:   Diagnosis Date    Atherosclerosis of abdominal aorta     noted on CT scan 10/27/2015    Back pain     BPPV (benign paroxysmal positional vertigo)     Cancer     skin cancer    Chronic kidney disease, stage III (moderate)     Dependent edema     Diabetes with neurologic complications     DVT (deep venous thrombosis)     Essential tremor     Hearing loss     with hearing aides 1/2014    History of shingles 2017    Hyperlipidemia LDL goal <100     Hypertension     Left posterior capsular opacification 3/23/2017    Macular degeneration of left eye     Macular hole     od    Mild carotid artery disease     Mild hyperparathyroidism     Osteoarthritis     Osteoporosis, postmenopausal     Overweight(278.02)     Peripheral vascular disease     Polymyalgia rheumatica     followed by rheumatology, Dr. Mendez    Stenosis of abdominal aorta     noted on CT scan 10/27/2015; probable high-grade stenosis at the origin of the SMA and left renal arteries    Type II or unspecified type diabetes mellitus without mention of complication, not stated as uncontrolled     Urinary incontinence     Varicose veins        Travel Hx :   Visited [place] one year ago.    Immunization :  Immunization History   Administered Date(s) Administered    Influenza 12/03/2007, 10/11/2008, 10/10/2009, 10/02/2010, 10/05/2011, 10/07/2013    Influenza - High Dose 09/29/2012, 10/20/2014, 12/08/2016, 09/21/2017    Influenza - Quadrivalent - PF 12/08/2016    Influenza A (H1N1) 2009 Monovalent - IM - PF 11/10/2009    Influenza Split 10/07/2013    Pneumococcal Conjugate - 13 Valent 12/14/2015    Pneumococcal Polysaccharide - 23 Valent 10/20/2014    Tdap 02/21/2018    Zoster 10/15/2015       Family Hx : Reviewed. Neg for blood clots.  Family History    Problem Relation Age of Onset    Hypertension Unknown     Edema Unknown     Breast cancer Sister     No Known Problems Mother     No Known Problems Father     No Known Problems Brother     No Known Problems Maternal Aunt     No Known Problems Maternal Uncle     No Known Problems Paternal Aunt     No Known Problems Paternal Uncle     No Known Problems Maternal Grandmother     No Known Problems Maternal Grandfather     No Known Problems Paternal Grandmother     No Known Problems Paternal Grandfather     Amblyopia Neg Hx     Blindness Neg Hx     Cataracts Neg Hx     Diabetes Neg Hx     Glaucoma Neg Hx     Macular degeneration Neg Hx     Retinal detachment Neg Hx     Strabismus Neg Hx     Stroke Neg Hx     Thyroid disease Neg Hx     Cancer Neg Hx        Social Hx :  Social History     Social History    Marital status:      Spouse name: N/A    Number of children: N/A    Years of education: N/A     Occupational History    Not on file.     Social History Main Topics    Smoking status: Never Smoker    Smokeless tobacco: Never Used    Alcohol use No    Drug use: No    Sexual activity: No     Other Topics Concern    Not on file     Social History Narrative    She lives by herself. She is originally from [place].       Surgery :   Hysterectomy; Knee surgery . Cataract surgery, bilateral    Symptoms :    Review of Systems   Constitutional: Negative for chills, diaphoresis, fever, malaise/fatigue and weight loss.   HENT: Positive for hearing loss. Negative for congestion, ear discharge, ear pain, nosebleeds, sinus pain, sore throat and tinnitus.    Eyes: Negative for blurred vision, double vision, photophobia, pain, discharge and redness.   Respiratory: Negative for cough, hemoptysis, sputum production, shortness of breath, wheezing and stridor.    Cardiovascular: Negative for chest pain, palpitations, orthopnea, claudication, leg swelling and PND.   Gastrointestinal: Negative for  abdominal pain, blood in stool, constipation, diarrhea, heartburn, melena, nausea and vomiting.   Genitourinary: Negative for dysuria, flank pain, frequency, hematuria and urgency.   Musculoskeletal: Positive for joint pain. Negative for back pain, falls, myalgias and neck pain.   Skin: Negative for itching and rash.   Neurological: Negative for dizziness, tingling, tremors, sensory change, speech change, focal weakness, seizures, loss of consciousness, weakness and headaches.   Endo/Heme/Allergies: Negative for environmental allergies and polydipsia. Does not bruise/bleed easily.   Psychiatric/Behavioral: Negative for depression, hallucinations, memory loss, substance abuse and suicidal ideas. The patient is not nervous/anxious and does not have insomnia.        Physical Exam :   Daughter present in the room  Physical Exam   Constitutional: She is oriented to person, place, and time and well-developed, well-nourished, and in no distress. Vital signs are normal. No distress.   HENT:   Head: Normocephalic and atraumatic.   Right Ear: External ear normal.   Left Ear: External ear normal.   Nose: Nose normal.   Mouth/Throat: Oropharynx is clear and moist.   Eyes: Conjunctivae, EOM and lids are normal. Pupils are equal, round, and reactive to light. Lids are everted and swept, no foreign bodies found. Right eye exhibits no discharge. Left eye exhibits no discharge.       Neck: Trachea normal, normal range of motion, full passive range of motion without pain and phonation normal. Neck supple. Normal carotid pulses, no hepatojugular reflux and no JVD present. No tracheal tenderness present. Carotid bruit is not present. No tracheal deviation present. No thyroid mass and no thyromegaly present.   Cardiovascular: Normal rate, regular rhythm, normal heart sounds, intact distal pulses and normal pulses.  Exam reveals no gallop and no friction rub.    Pulmonary/Chest: Effort normal and breath sounds normal. No stridor. No  apnea. No respiratory distress. She has no wheezes. She has no rales. She exhibits no tenderness.   Abdominal: Soft. Normal appearance, normal aorta and bowel sounds are normal. She exhibits no distension and no mass. There is no hepatosplenomegaly. There is no tenderness. There is no rebound, no guarding and no CVA tenderness. No hernia.   Genitourinary:   Genitourinary Comments: Not examined   Musculoskeletal: Normal range of motion. She exhibits no edema or tenderness.        Legs:  Lymphadenopathy:        Head (right side): No submental, no submandibular, no tonsillar, no preauricular, no posterior auricular and no occipital adenopathy present.        Head (left side): No submental, no submandibular, no tonsillar, no preauricular and no posterior auricular adenopathy present.     She has no cervical adenopathy.     She has no axillary adenopathy.        Right: No inguinal, no supraclavicular and no epitrochlear adenopathy present.        Left: No inguinal, no supraclavicular and no epitrochlear adenopathy present.   Neurological: She is alert and oriented to person, place, and time. She has normal motor skills, normal sensation, normal strength, normal reflexes and intact cranial nerves. She displays normal reflexes. No cranial nerve deficit. She exhibits normal muscle tone. Gait normal. Coordination normal. GCS score is 15.   Skin: Skin is warm, dry and intact. No rash noted. She is not diaphoretic. No cyanosis or erythema. No pallor. Nails show no clubbing.   Psychiatric: Mood, memory, affect and judgment normal.   Nursing note and vitals reviewed.        Labs & Imaging :  06/24/18 : CT angiogram Chest : No evidence of PE. Chronic Lung Changes. Two subcentimeter nodules and stable  Right adrenal nodule.   Venous Doppler Right Lower extremity : Extensive thrombus involving the entire  Left lower extremity.  06/24/18 :  NFBS 88; Cr.1.0; Ca 9.7 Bili 1.2. ALP 83. Hgb 14.6; Hct 43.8;  Plts 149,000 ANC 3,900      Dx  :   Unprovoked  DVT  Left Lower Extremity      Assessment & Plan:  Reviewed with patient and daughter. This is the third incident of DVT involving the same extremity. Patient is agreeable to getting tests. Coag labs ordered. Re evaluate with results

## 2018-08-02 ENCOUNTER — LAB VISIT (OUTPATIENT)
Dept: LAB | Facility: HOSPITAL | Age: 83
End: 2018-08-02
Attending: INTERNAL MEDICINE
Payer: MEDICARE

## 2018-08-02 ENCOUNTER — INITIAL CONSULT (OUTPATIENT)
Dept: HEMATOLOGY/ONCOLOGY | Facility: CLINIC | Age: 83
End: 2018-08-02
Payer: MEDICARE

## 2018-08-02 VITALS
BODY MASS INDEX: 30.58 KG/M2 | HEIGHT: 64 IN | SYSTOLIC BLOOD PRESSURE: 138 MMHG | DIASTOLIC BLOOD PRESSURE: 86 MMHG | WEIGHT: 179.13 LBS | HEART RATE: 113 BPM | OXYGEN SATURATION: 96 % | TEMPERATURE: 98 F

## 2018-08-02 DIAGNOSIS — I82.4Z2 ACUTE DEEP VEIN THROMBOSIS (DVT) OF DISTAL VEIN OF LEFT LOWER EXTREMITY: ICD-10-CM

## 2018-08-02 DIAGNOSIS — I82.4Z2 ACUTE DEEP VEIN THROMBOSIS (DVT) OF DISTAL VEIN OF LEFT LOWER EXTREMITY: Primary | ICD-10-CM

## 2018-08-02 LAB — FIBRINOGEN PPP-MCNC: 325 MG/DL

## 2018-08-02 PROCEDURE — 83090 ASSAY OF HOMOCYSTEINE: CPT

## 2018-08-02 PROCEDURE — 36415 COLL VENOUS BLD VENIPUNCTURE: CPT

## 2018-08-02 PROCEDURE — 85613 RUSSELL VIPER VENOM DILUTED: CPT

## 2018-08-02 PROCEDURE — 81241 F5 GENE: CPT

## 2018-08-02 PROCEDURE — 99999 PR PBB SHADOW E&M-EST. PATIENT-LVL III: CPT | Mod: PBBFAC,,, | Performed by: INTERNAL MEDICINE

## 2018-08-02 PROCEDURE — 85384 FIBRINOGEN ACTIVITY: CPT

## 2018-08-02 PROCEDURE — 86147 CARDIOLIPIN ANTIBODY EA IG: CPT

## 2018-08-02 PROCEDURE — 85300 ANTITHROMBIN III ACTIVITY: CPT

## 2018-08-02 PROCEDURE — 85305 CLOT INHIBIT PROT S TOTAL: CPT

## 2018-08-02 PROCEDURE — 99205 OFFICE O/P NEW HI 60 MIN: CPT | Mod: S$GLB,,, | Performed by: INTERNAL MEDICINE

## 2018-08-02 PROCEDURE — 85598 HEXAGNAL PHOSPH PLTLT NEUTRL: CPT

## 2018-08-02 PROCEDURE — 85303 CLOT INHIBIT PROT C ACTIVITY: CPT

## 2018-08-02 PROCEDURE — 81240 F2 GENE: CPT

## 2018-08-02 NOTE — LETTER
August 2, 2018      Mirtha Suarez, FNP-C  [address] [facility]  [place] [address]           [facility]  [address] [facility] [address] [place] [address]  Phone: [phone]          Patient: Patito Mcintyre   MR Number: [id number]   YOB: 1925   Date of Visit: 8/2/2018       Dear Mirtha Suarez:    Thank you for referring Patito Mcintyre to me for evaluation. Attached you will find relevant portions of my assessment and plan of care.    If you have questions, please do not hesitate to call me. I look forward to following Patito Mcintyre along with you.    Sincerely,    Javon Garza MD    Enclosure  CC:  No Recipients    If you would like to receive this communication electronically, please contact [email or web address] or [phone] to request more information on [facility] access.    For providers and/or their staff who would like to refer a patient to [facility], please contact us through our one-stop-shop provider referral line, Danilo Oliver, at [phone].    If you feel you have received this communication in error or would no longer like to receive these types of communications, please e-mail [email or web address]

## 2018-08-03 LAB
CARDIOLIPIN IGG SER IA-ACNC: <9.4 GPL
CARDIOLIPIN IGM SER IA-ACNC: <9.4 MPL
HCYS SERPL-SCNC: 8.8 UMOL/L

## 2018-08-06 ENCOUNTER — TELEPHONE (OUTPATIENT)
Dept: FAMILY MEDICINE | Facility: CLINIC | Age: 83
End: 2018-08-06

## 2018-08-06 DIAGNOSIS — M81.0 OSTEOPOROSIS, POSTMENOPAUSAL: Primary | ICD-10-CM

## 2018-08-06 LAB
APTT HEX PL PPP: NEGATIVE S
AT III ACT/NOR PPP CHRO: 124 %
F2 GENE MUT ANL BLD/T: NORMAL
F5 P.R506Q BLD/T QL: NORMAL

## 2018-08-06 RX ORDER — ZOLEDRONIC ACID 5 MG/100ML
5 INJECTION, SOLUTION INTRAVENOUS ONCE
Qty: 100 ML | Refills: 0 | Status: SHIPPED | OUTPATIENT
Start: 2018-08-06 | End: 2018-08-06

## 2018-08-06 RX ORDER — ZOLEDRONIC ACID 5 MG/100ML
5 INJECTION, SOLUTION INTRAVENOUS ONCE
Status: CANCELLED | OUTPATIENT
Start: 2018-08-06 | End: 2018-08-06

## 2018-08-06 RX ORDER — SODIUM CHLORIDE 0.9 % (FLUSH) 0.9 %
10 SYRINGE (ML) INJECTION
Status: CANCELLED | OUTPATIENT
Start: 2018-08-06

## 2018-08-06 RX ORDER — HEPARIN SODIUM (PORCINE) LOCK FLUSH IV SOLN 100 UNIT/ML 100 UNIT/ML
100 SOLUTION INTRAVENOUS
Status: CANCELLED | OUTPATIENT
Start: 2018-08-06

## 2018-08-06 NOTE — TELEPHONE ENCOUNTER
Pt has been informed on orders. Pt advised that she will received a call for scheduling. Pt verbalized understanding.

## 2018-08-06 NOTE — TELEPHONE ENCOUNTER
Spoke with the pt, I informed her that  has placed the order for the Reclast.  Pt is having a hard time understanding and hearing.  Please call this pt.

## 2018-08-06 NOTE — TELEPHONE ENCOUNTER
----- Message from Margaret Stern MD sent at 2/19/2018  9:54 AM CST -----  Due for Reclast infusion this month.

## 2018-08-06 NOTE — TELEPHONE ENCOUNTER
Please call patient: I have put in an order for her Reclast infusion. Someone will call her to schedule.

## 2018-08-07 LAB — LA PPP-IMP: NORMAL

## 2018-08-09 LAB
APTT PROTEIN C ACTIVATOR+FV DP/APTT PPP: 112 %
PROT S ACT/NOR PPP: >130 %

## 2018-08-14 NOTE — PROGRESS NOTES
Chief Complaint :    Hx of DVT    Hx of Present illness :  Patient is a 89 y.o. year old female who presents to the clinic today for   Oncology evaluation. Recent Dx of DVT  Left  lower extremity. Noticed swelling right leg, led to Dx. Of DVT.  No recent Hx of surgery, injury, long distance travel      Allergies :    Review of patient's allergies indicates:   Allergen Reactions    Inderal [propranolol]     Indomethacin sodium      Other reaction(s): Vomiting  Other reaction(s): Nausea       Occupation :  homemaker    Transfusion :  None    Menstrual & obstetric Hx :  4, para 3.  Age of menarche: 12  Age of first pregnancy:  20  Lactation history:  Yes  Age of menopause:  Hysterectomy  Age 60.  HRT: Does not remember    Present Meds :   Medication List with Changes/Refills   Current Medications    ACETAMINOPHEN (TYLENOL ORAL)    Take 1 tablet by mouth as needed.    ASPIRIN (ECOTRIN) 81 MG EC TABLET    Take 1 tablet by mouth Daily. Pt. Take orange flavored chewable tablet.    ATORVASTATIN (LIPITOR) 40 MG TABLET    TAKE 1 TABLET BY MOUTH EVERY EVENING    CALCIUM CARBONATE/VITAMIN D3 (OS-WENDY 500 + D3 ORAL)    Take by mouth.    GLUCOSAMINE HCL/CHONDR LEARY A NA (OSTEO BI-FLEX ORAL)    Take 1 tablet by mouth once daily.    LOSARTAN (COZAAR) 50 MG TABLET    Take 1 tablet (50 mg total) by mouth once daily.    OMEGA-3S/DHA/EPA/FISH OIL (OMEGA 3 ORAL)    Take 550 mg by mouth. 1 Capsule Oral Every day    OMEPRAZOLE (PRILOSEC) 20 MG CAPSULE    TAKE 1 CAPSULE BY MOUTH TWICE DAILY    RIVAROXABAN (XARELTO) 15 MG TAB    Take 1 tablet (15 mg total) by mouth 2 (two) times daily with meals.    VITAMIN E 200 UNIT CAPSULE    Take 200 Units by mouth once daily.       Past Medical Hx :   Recent Dx of DVT involving the  Left lower extremity. Hyperlipidemia; hypertension; past Hx of shingles; CKD 3; DM with neurologic complications. Macular degeneration. Mild carotid artery disease.  Chronic back pain. Benign vertigo. Has had two  p[revious episodes of DVT involving the left lower extremity    Past Medical Hx :  Past Medical History:   Diagnosis Date    Atherosclerosis of abdominal aorta     noted on CT scan 10/27/2015    Back pain     BPPV (benign paroxysmal positional vertigo)     Cancer     skin cancer    Chronic kidney disease, stage III (moderate)     Dependent edema     Diabetes with neurologic complications     DVT (deep venous thrombosis)     Essential tremor     Hearing loss     with hearing aides 1/2014    History of shingles 2017    Hyperlipidemia LDL goal <100     Hypertension     Left posterior capsular opacification 3/23/2017    Macular degeneration of left eye     Macular hole     od    Mild carotid artery disease     Mild hyperparathyroidism     Osteoarthritis     Osteoporosis, postmenopausal     Overweight(278.02)     Peripheral vascular disease     Polymyalgia rheumatica     followed by rheumatology, Dr. Mendez    Stenosis of abdominal aorta     noted on CT scan 10/27/2015; probable high-grade stenosis at the origin of the SMA and left renal arteries    Type II or unspecified type diabetes mellitus without mention of complication, not stated as uncontrolled     Urinary incontinence     Varicose veins        Travel Hx :   Visited [place] one year ago.    Immunization :  Immunization History   Administered Date(s) Administered    Influenza 12/03/2007, 10/11/2008, 10/10/2009, 10/02/2010, 10/05/2011, 10/07/2013    Influenza - High Dose 09/29/2012, 10/20/2014, 12/08/2016, 09/21/2017    Influenza - Quadrivalent - PF 12/08/2016    Influenza A (H1N1) 2009 Monovalent - IM - PF 11/10/2009    Influenza Split 10/07/2013    Pneumococcal Conjugate - 13 Valent 12/14/2015    Pneumococcal Polysaccharide - 23 Valent 10/20/2014    Tdap 02/21/2018    Zoster 10/15/2015       Family Hx : Reviewed. Neg for blood clots.  Family History   Problem Relation Age of Onset    Hypertension Unknown     Edema Unknown      Breast cancer Sister     No Known Problems Mother     No Known Problems Father     No Known Problems Brother     No Known Problems Maternal Aunt     No Known Problems Maternal Uncle     No Known Problems Paternal Aunt     No Known Problems Paternal Uncle     No Known Problems Maternal Grandmother     No Known Problems Maternal Grandfather     No Known Problems Paternal Grandmother     No Known Problems Paternal Grandfather     Amblyopia Neg Hx     Blindness Neg Hx     Cataracts Neg Hx     Diabetes Neg Hx     Glaucoma Neg Hx     Macular degeneration Neg Hx     Retinal detachment Neg Hx     Strabismus Neg Hx     Stroke Neg Hx     Thyroid disease Neg Hx     Cancer Neg Hx        Social Hx :  Social History     Socioeconomic History    Marital status:      Spouse name: Not on file    Number of children: Not on file    Years of education: Not on file    Highest education level: Not on file   Social Needs    Financial resource strain: Not on file    Food insecurity - worry: Not on file    Food insecurity - inability: Not on file    Transportation needs - medical: Not on file    Transportation needs - non-medical: Not on file   Occupational History    Not on file   Tobacco Use    Smoking status: Never Smoker    Smokeless tobacco: Never Used   Substance and Sexual Activity    Alcohol use: No    Drug use: No    Sexual activity: No   Other Topics Concern    Not on file   Social History Narrative    She lives by herself. She is originally from [place].       Surgery :   Hysterectomy; Knee surgery . Cataract surgery, bilateral    Symptoms :  No New Sx    Review of Systems   Constitutional: Negative for chills, diaphoresis, fever, malaise/fatigue and weight loss.   HENT: Positive for hearing loss. Negative for congestion, ear discharge, ear pain, nosebleeds, sinus pain, sore throat and tinnitus.    Eyes: Negative for blurred vision, double vision, photophobia, pain, discharge and  redness.   Respiratory: Negative for cough, hemoptysis, sputum production, shortness of breath, wheezing and stridor.    Cardiovascular: Negative for chest pain, palpitations, orthopnea, claudication, leg swelling and PND.   Gastrointestinal: Negative for abdominal pain, blood in stool, constipation, diarrhea, heartburn, melena, nausea and vomiting.   Genitourinary: Negative for dysuria, flank pain, frequency, hematuria and urgency.   Musculoskeletal: Positive for joint pain. Negative for back pain, falls, myalgias and neck pain.   Skin: Negative for itching and rash.   Neurological: Negative for dizziness, tingling, tremors, sensory change, speech change, focal weakness, seizures, loss of consciousness, weakness and headaches.   Endo/Heme/Allergies: Negative for environmental allergies and polydipsia. Does not bruise/bleed easily.   Psychiatric/Behavioral: Negative for depression, hallucinations, memory loss, substance abuse and suicidal ideas. The patient is not nervous/anxious and does not have insomnia.        Physical Exam :   Daughter present in the room  Physical Exam   Constitutional: She is oriented to person, place, and time and well-developed, well-nourished, and in no distress. Vital signs are normal. No distress.   HENT:   Head: Normocephalic and atraumatic.   Right Ear: External ear normal.   Left Ear: External ear normal.   Nose: Nose normal.   Mouth/Throat: Oropharynx is clear and moist.   Eyes: Conjunctivae, EOM and lids are normal. Pupils are equal, round, and reactive to light. Lids are everted and swept, no foreign bodies found. Right eye exhibits no discharge. Left eye exhibits no discharge.       Neck: Trachea normal, normal range of motion, full passive range of motion without pain and phonation normal. Neck supple. Normal carotid pulses, no hepatojugular reflux and no JVD present. No tracheal tenderness present. Carotid bruit is not present. No tracheal deviation present. No thyroid mass and  no thyromegaly present.   Cardiovascular: Normal rate, regular rhythm, normal heart sounds, intact distal pulses and normal pulses. Exam reveals no gallop and no friction rub.   Pulmonary/Chest: Effort normal and breath sounds normal. No stridor. No apnea. No respiratory distress. She has no wheezes. She has no rales. She exhibits no tenderness.   Abdominal: Soft. Normal appearance, normal aorta and bowel sounds are normal. She exhibits no distension and no mass. There is no hepatosplenomegaly. There is no tenderness. There is no rebound, no guarding and no CVA tenderness. No hernia.   Genitourinary:   Genitourinary Comments: Not examined   Musculoskeletal: Normal range of motion. She exhibits no edema or tenderness.        Legs:  Lymphadenopathy:        Head (right side): No submental, no submandibular, no tonsillar, no preauricular, no posterior auricular and no occipital adenopathy present.        Head (left side): No submental, no submandibular, no tonsillar, no preauricular and no posterior auricular adenopathy present.     She has no cervical adenopathy.     She has no axillary adenopathy.        Right: No inguinal, no supraclavicular and no epitrochlear adenopathy present.        Left: No inguinal, no supraclavicular and no epitrochlear adenopathy present.   Neurological: She is alert and oriented to person, place, and time. She has normal motor skills, normal sensation, normal strength, normal reflexes and intact cranial nerves. No cranial nerve deficit. She exhibits normal muscle tone. Gait normal. Coordination normal. GCS score is 15.   Skin: Skin is warm, dry and intact. No rash noted. She is not diaphoretic. No cyanosis or erythema. No pallor. Nails show no clubbing.   Psychiatric: Mood, memory, affect and judgment normal.   Nursing note and vitals reviewed.  No new finding.      Labs & Imaging :  06/24/18 : CT angiogram Chest : No evidence of PE. Chronic Lung Changes. Two subcentimeter nodules and  stable  Right adrenal nodule.   Venous Doppler Right Lower extremity : Extensive thrombus involving the entire  Left lower extremity.  06/24/18 :  NFBS 88; Cr.1.0; Ca 9.7 Bili 1.2. ALP 83. Hgb 14.6; Hct 43.8;  Plts 149,000 ANC 3,900  08/02/18 : Factor 2 and Leiden Factor 5 : normal genotype,  Holmocysteine normal at 8.8. Lupus anticoagulant not detected. Cardiolipin antibody normal. Protein C and Protein s activity normal. AT 3 125. Fibrinogen normal. Hexagonal Phospholipid neutralization normal.     Dx :   Unprovoked  DVT  Left Lower Extremity      Assessment & Plan:  Reviewed with patient and daughter. This is the third incident of DVT involving the same extremity.  Presently On xeralto 20 Mg daily. Continue same.  followup with  and . Oncology followup as needed.

## 2018-08-15 ENCOUNTER — OFFICE VISIT (OUTPATIENT)
Dept: CARDIOLOGY | Facility: CLINIC | Age: 83
End: 2018-08-15
Payer: MEDICARE

## 2018-08-15 VITALS
HEIGHT: 64 IN | BODY MASS INDEX: 31.66 KG/M2 | HEART RATE: 110 BPM | DIASTOLIC BLOOD PRESSURE: 62 MMHG | SYSTOLIC BLOOD PRESSURE: 128 MMHG | OXYGEN SATURATION: 96 % | RESPIRATION RATE: 15 BRPM | WEIGHT: 185.44 LBS

## 2018-08-15 DIAGNOSIS — Q25.1 STENOSIS OF ABDOMINAL AORTA: ICD-10-CM

## 2018-08-15 DIAGNOSIS — I73.9 PVD (PERIPHERAL VASCULAR DISEASE): ICD-10-CM

## 2018-08-15 DIAGNOSIS — I77.89 OTHER SPECIFIED DISORDERS OF ARTERIES AND ARTERIOLES: ICD-10-CM

## 2018-08-15 DIAGNOSIS — E78.2 MIXED HYPERLIPIDEMIA: Chronic | ICD-10-CM

## 2018-08-15 DIAGNOSIS — I70.0 ATHEROSCLEROSIS OF ABDOMINAL AORTA: ICD-10-CM

## 2018-08-15 DIAGNOSIS — Z86.718 HISTORY OF DVT (DEEP VEIN THROMBOSIS): Primary | Chronic | ICD-10-CM

## 2018-08-15 DIAGNOSIS — I77.9 MILD CAROTID ARTERY DISEASE: ICD-10-CM

## 2018-08-15 DIAGNOSIS — E11.40 TYPE 2 DIABETES, CONTROLLED, WITH NEUROPATHY: ICD-10-CM

## 2018-08-15 PROCEDURE — 99999 PR PBB SHADOW E&M-EST. PATIENT-LVL IV: CPT | Mod: PBBFAC,,, | Performed by: INTERNAL MEDICINE

## 2018-08-15 PROCEDURE — 99214 OFFICE O/P EST MOD 30 MIN: CPT | Mod: S$GLB,,, | Performed by: INTERNAL MEDICINE

## 2018-08-15 PROCEDURE — 93010 ELECTROCARDIOGRAM REPORT: CPT | Mod: S$GLB,,, | Performed by: INTERNAL MEDICINE

## 2018-08-15 NOTE — PROGRESS NOTES
CARDIOVASCULAR PROGRESS NOTE    REASON FOR CONSULT:   Patito Mcintyre is a 89 y.o. female who presents for follow up of htn, DVT (recurrent).    PCP: Leena Whiting: Greg  HISTORY OF PRESENT ILLNESS:   The patient returns today for follow-up.  It has been nearly a year since his last seen in the office.  He in June 2018, she was diagnosed with a recurrent left lower extremity DVT has since been placed back on Xarelto.  She tells me the swelling and discomfort is much improved, but still present.  She has obvious venous varicosities.  She otherwise denies angina or dyspnea.  There has been no palpitations, lightheadedness, dizziness, or syncope.  She denies any PND, orthopnea, melena, hematuria, or claudicant symptoms.  She appears to be tolerating the Xarelto b.i.d. anticoagulation without complications.    She has been seen by Hematology recently and a hypercoagulable panel was ordered.  I reviewed labs dated 08/02/2018 which all appear to be negative.  Antithrombin III activity was increased, but this should be protective for clotting.    CARDIOVASCULAR HISTORY:   PAD: Probable high-grade stenosis at the origin of the SMA and left renal arteries. (CTA abd 10/27/15)  CVI  DVT LLE 8/2016, resolved on venous US 2/2017, recurrent 6/2018    PAST MEDICAL HISTORY:     Past Medical History:   Diagnosis Date    Atherosclerosis of abdominal aorta     noted on CT scan 10/27/2015    Back pain     BPPV (benign paroxysmal positional vertigo)     Cancer     skin cancer    Chronic kidney disease, stage III (moderate)     Dependent edema     Diabetes with neurologic complications     DVT (deep venous thrombosis)     Essential tremor     Hearing loss     with hearing aides 1/2014    History of shingles 2017    Hyperlipidemia LDL goal <100     Hypertension     Left posterior capsular opacification 3/23/2017    Macular degeneration of left eye     Macular hole     od    Mild carotid artery disease     Mild  hyperparathyroidism     Osteoarthritis     Osteoporosis, postmenopausal     Overweight(278.02)     Peripheral vascular disease     Polymyalgia rheumatica     followed by rheumatology, Dr. Mendez    Stenosis of abdominal aorta     noted on CT scan 10/27/2015; probable high-grade stenosis at the origin of the SMA and left renal arteries    Type II or unspecified type diabetes mellitus without mention of complication, not stated as uncontrolled     Urinary incontinence     Varicose veins        PAST SURGICAL HISTORY:     Past Surgical History:   Procedure Laterality Date    bilateral cataract surgery      bilateral leg vein dilation      CATARACT EXTRACTION      ou    left knee arthroscopy      left macular degeneration eye surgery      right knee surgery      SKIN CANCER EXCISION  2002, 2017    [facility]; face    TOTAL ABDOMINAL HYSTERECTOMY      YAG Laser Capsulotomy Left 03/23/2017    Dr. Faulkner       ALLERGIES AND MEDICATION:     Review of patient's allergies indicates:   Allergen Reactions    Inderal [propranolol]     Indomethacin sodium      Other reaction(s): Vomiting  Other reaction(s): Nausea     Previous Medications    ACETAMINOPHEN (TYLENOL ORAL)    Take 1 tablet by mouth as needed.    ASPIRIN (ECOTRIN) 81 MG EC TABLET    Take 1 tablet by mouth Daily. Pt. Take orange flavored chewable tablet.    ATORVASTATIN (LIPITOR) 40 MG TABLET    TAKE 1 TABLET BY MOUTH EVERY EVENING    CALCIUM CARBONATE/VITAMIN D3 (OS-WENDY 500 + D3 ORAL)    Take by mouth.    GLUCOSAMINE HCL/CHONDR LEARY A NA (OSTEO BI-FLEX ORAL)    Take 1 tablet by mouth once daily.    LOSARTAN (COZAAR) 50 MG TABLET    Take 1 tablet (50 mg total) by mouth once daily.    OMEGA-3S/DHA/EPA/FISH OIL (OMEGA 3 ORAL)    Take 550 mg by mouth. 1 Capsule Oral Every day    OMEPRAZOLE (PRILOSEC) 20 MG CAPSULE    TAKE 1 CAPSULE BY MOUTH TWICE DAILY    RIVAROXABAN (XARELTO) 15 MG TAB    Take 1 tablet (15 mg total) by mouth 2 (two) times daily with  meals.    VITAMIN E 200 UNIT CAPSULE    Take 200 Units by mouth once daily.       SOCIAL HISTORY:     Social History     Socioeconomic History    Marital status:      Spouse name: Not on file    Number of children: Not on file    Years of education: Not on file    Highest education level: Not on file   Social Needs    Financial resource strain: Not on file    Food insecurity - worry: Not on file    Food insecurity - inability: Not on file    Transportation needs - medical: Not on file    Transportation needs - non-medical: Not on file   Occupational History    Not on file   Tobacco Use    Smoking status: Never Smoker    Smokeless tobacco: Never Used   Substance and Sexual Activity    Alcohol use: No    Drug use: No    Sexual activity: No   Other Topics Concern    Not on file   Social History Narrative    She lives by herself. She is originally from [place].       FAMILY HISTORY:     Family History   Problem Relation Age of Onset    Hypertension Unknown     Edema Unknown     Breast cancer Sister     No Known Problems Mother     No Known Problems Father     No Known Problems Brother     No Known Problems Maternal Aunt     No Known Problems Maternal Uncle     No Known Problems Paternal Aunt     No Known Problems Paternal Uncle     No Known Problems Maternal Grandmother     No Known Problems Maternal Grandfather     No Known Problems Paternal Grandmother     No Known Problems Paternal Grandfather     Amblyopia Neg Hx     Blindness Neg Hx     Cataracts Neg Hx     Diabetes Neg Hx     Glaucoma Neg Hx     Macular degeneration Neg Hx     Retinal detachment Neg Hx     Strabismus Neg Hx     Stroke Neg Hx     Thyroid disease Neg Hx     Cancer Neg Hx        REVIEW OF SYSTEMS:   Review of Systems   Constitutional: Negative for chills, diaphoresis and fever.   HENT: Negative for nosebleeds.    Eyes: Negative for blurred vision, double vision and photophobia.   Respiratory: Negative  "for hemoptysis, shortness of breath and wheezing.    Cardiovascular: Negative for chest pain, palpitations, orthopnea, claudication, leg swelling and PND.   Gastrointestinal: Negative for abdominal pain, blood in stool, heartburn, melena, nausea and vomiting.   Genitourinary: Negative for flank pain and hematuria.   Musculoskeletal: Negative for falls, myalgias and neck pain.   Skin: Negative for rash.   Neurological: Positive for tremors. Negative for dizziness, seizures, loss of consciousness, weakness and headaches.   Endo/Heme/Allergies: Negative for polydipsia. Does not bruise/bleed easily.   Psychiatric/Behavioral: Negative for depression and memory loss. The patient is not nervous/anxious.        PHYSICAL EXAM:     Vitals:    08/15/18 1026   BP: 128/62   Pulse: 110   Resp: 15    Body mass index is 31.83 kg/m².  Weight: 84.1 kg (185 lb 6.5 oz)   Height: 5' 4" (162.6 cm)     Physical Exam   Constitutional: She is oriented to person, place, and time. She appears well-developed and well-nourished. She is cooperative.  Non-toxic appearance. No distress.   HENT:   Head: Normocephalic and atraumatic.   Eyes: Conjunctivae and EOM are normal. Pupils are equal, round, and reactive to light. No scleral icterus.   Neck: Trachea normal and normal range of motion. Neck supple. Normal carotid pulses and no JVD present. Carotid bruit is not present. No neck rigidity. No tracheal deviation and no edema present. No thyromegaly present.   Cardiovascular: Regular rhythm, S1 normal and S2 normal. Tachycardia present. PMI is not displaced. Exam reveals no gallop and no friction rub.   No murmur heard.  Pulses:       Carotid pulses are 2+ on the right side, and 2+ on the left side with bruit.  LLE venous varicosities   Pulmonary/Chest: Effort normal and breath sounds normal. No stridor. No respiratory distress. She has no wheezes. She has no rales. She exhibits no tenderness.   Abdominal: Soft. She exhibits no distension. There is " no hepatosplenomegaly.   Musculoskeletal: She exhibits no edema or tenderness.   Feet:   Right Foot:   Skin Integrity: Negative for ulcer.   Left Foot:   Skin Integrity: Negative for ulcer.   Neurological: She is alert and oriented to person, place, and time. She displays tremor. No cranial nerve deficit.   Skin: Skin is warm and dry. No rash noted. No erythema.   Psychiatric: She has a normal mood and affect. Her speech is normal and behavior is normal.   Vitals reviewed.      DATA:   EKG: (personally reviewed tracing)  8/15/18 , PRWP, similar to 8/23/17    Laboratory:  CBC:  Recent Labs   Lab  08/16/16 0447 07/17/17   0845  06/24/18 1915   WHITE BLOOD CELL COUNT  6.21  5.70  7.00   HEMOGLOBIN  11.9 L  15.8  14.6   HEMATOCRIT  34.4 L  48.1  43.8   PLATELETS  245  211  149 L       CHEMISTRIES:  Recent Labs   Lab  08/16/16 0447 08/18/16   0437   08/19/16   0435  07/17/17   0845  06/24/18 1915   GLUCOSE  102   < >  95   < >  103  115 H  88   SODIUM  116 LL   < >  137   < >  137  140  139   POTASSIUM  3.8   < >  4.1   < >  4.2  4.2  4.7   BUN BLD  9 L   < >  12   < >  12  18  19   CREATININE  0.8   < >  0.8   < >  0.8  0.9  1.0   EGFR IF   >60   < >  >60   < >  >60  >60.0  56 A   EGFR IF NON-  >60   < >  >60   < >  >60  55.7 A  49 A   CALCIUM  8.1 L   < >  8.9   < >  9.1  10.1  9.7   MAGNESIUM  1.6   --   2.1   --   2.2   --    --     < > = values in this interval not displayed.       CARDIAC BIOMARKERS:  Recent Labs   Lab  08/15/16   2236   TROPONIN I  <0.006       COAGS:  Recent Labs   Lab  08/05/16   1718  08/06/16 0441   INR  1.0  1.0       LIPIDS/LFTS:  Recent Labs   Lab  03/28/16   0933   08/16/16 0447  07/17/17   0845  06/24/18 1915   CHOLESTEROL  111 L   --    --   124   --    TRIGLYCERIDES  90   --    --   118   --    HDL  38 L   --    --   41   --    LDL CHOLESTEROL  55.0 L   --    --   59.4 L   --    NON-HDL CHOLESTEROL  73   --    --   83   --    AST   30   < >  25  37  40   ALT  17   < >  19  21  18    < > = values in this interval not displayed.       Cardiovascular Testing:  LLE venous US 6/24/18 (recurrent LLE DVT vs 2/2017 report)  Extensive left lower extremity acute deep venous thrombosis.    Echo 7/11/16:    1 - Normal left ventricular systolic function (EF 55-60%).  Basal inferior hypokinesis.     2 - Concentric remodeling.     3 - Left ventricular diastolic dysfunction.     4 - Trivial to mild mitral regurgitation.     5 - Mild tricuspid regurgitation.     6 - Pulmonary hypertension. The estimated PA systolic pressure is 43 mmHg.     CTA abdomen 10/27/15:  Distended contrast-filled bladder. No vesico-enteric fistula identified. Trace amount of contrast seen in the vaginal canal.  Extensive calcified atherosclerotic disease with probable high-grade stenosis at the origin of the SMA and left renal arteries.  Significantly distended gas and fluid-filled stomach.    Carotid US 1/8/16  #1. Findings consistent with less than 50% stenosis in the Right carotid artery bulb.  #2. Findings consistent with less than 50% stenosis in the Left carotid artery bulb.    ASSESSMENT:   # DVT LLE 8/2016, resolved by US 2/2017, recurrent 6/2018.  Hypercoag w/u neg (see labs 8/2/18).  ?May-Thurner synd.  # Peripheral vascular disease as evidence by the vascular stenoses of her SMA and L renal arteries. Fortunately, these do not seem to be causing and significant problems/symptoms at this juncture.  # Carotid bruits, pt denies prior h/o CVA/TIA. Carotid US 1/8/16 neg for significant stenosis  # HTN, controlled  # HLP, on atorva 40mg  # Venous insufficiency s/p venous stripping    PLAN:   Cont med rx  Change Xarelto to 20mg qd  Vasc surg eval, ?May Thurner synd  Carotid US for surveillance  F/u with heme as planned, likely plan long term OAC (Xarelto) unless May-Thurner diagnosed  RTC 6 months    Scotty Landin MD, [facility]

## 2018-08-16 ENCOUNTER — OFFICE VISIT (OUTPATIENT)
Dept: HEMATOLOGY/ONCOLOGY | Facility: CLINIC | Age: 83
End: 2018-08-16
Payer: MEDICARE

## 2018-08-16 VITALS
BODY MASS INDEX: 30.88 KG/M2 | HEART RATE: 106 BPM | OXYGEN SATURATION: 97 % | DIASTOLIC BLOOD PRESSURE: 67 MMHG | WEIGHT: 179.88 LBS | TEMPERATURE: 98 F | SYSTOLIC BLOOD PRESSURE: 122 MMHG

## 2018-08-16 DIAGNOSIS — I82.4Z2 ACUTE DEEP VEIN THROMBOSIS (DVT) OF DISTAL VEIN OF LEFT LOWER EXTREMITY: Primary | ICD-10-CM

## 2018-08-16 PROCEDURE — 99999 PR PBB SHADOW E&M-EST. PATIENT-LVL III: CPT | Mod: PBBFAC,,, | Performed by: INTERNAL MEDICINE

## 2018-08-16 PROCEDURE — 99213 OFFICE O/P EST LOW 20 MIN: CPT | Mod: S$GLB,,, | Performed by: INTERNAL MEDICINE

## 2018-09-06 ENCOUNTER — HOSPITAL ENCOUNTER (OUTPATIENT)
Dept: CARDIOLOGY | Facility: HOSPITAL | Age: 83
Discharge: HOME | End: 2018-09-06
Attending: INTERNAL MEDICINE
Payer: MEDICARE

## 2018-09-06 DIAGNOSIS — I77.89 OTHER SPECIFIED DISORDERS OF ARTERIES AND ARTERIOLES: ICD-10-CM

## 2018-09-06 DIAGNOSIS — I77.9 MILD CAROTID ARTERY DISEASE: ICD-10-CM

## 2018-09-06 LAB — INTERNAL CAROTID STENOSIS: ABNORMAL

## 2018-09-06 PROCEDURE — 93880 EXTRACRANIAL BILAT STUDY: CPT | Mod: 26,,, | Performed by: INTERNAL MEDICINE

## 2018-09-06 PROCEDURE — 93880 EXTRACRANIAL BILAT STUDY: CPT

## 2018-09-13 ENCOUNTER — OFFICE VISIT (OUTPATIENT)
Dept: VASCULAR SURGERY | Facility: CLINIC | Age: 83
End: 2018-09-13
Payer: MEDICARE

## 2018-09-13 VITALS
DIASTOLIC BLOOD PRESSURE: 78 MMHG | BODY MASS INDEX: 30.38 KG/M2 | HEIGHT: 64 IN | HEART RATE: 81 BPM | WEIGHT: 177.94 LBS | SYSTOLIC BLOOD PRESSURE: 124 MMHG

## 2018-09-13 DIAGNOSIS — R60.0 BILATERAL LEG EDEMA: ICD-10-CM

## 2018-09-13 DIAGNOSIS — I83.90 VARICOSE VEIN OF LEG: ICD-10-CM

## 2018-09-13 DIAGNOSIS — I82.412 ACUTE DEEP VEIN THROMBOSIS (DVT) OF FEMORAL VEIN OF LEFT LOWER EXTREMITY: Primary | ICD-10-CM

## 2018-09-13 DIAGNOSIS — K55.1 ARTERIOSCLEROSIS OF MESENTERIC ARTERY: ICD-10-CM

## 2018-09-13 DIAGNOSIS — I70.1 ATHEROSCLEROSIS OF RENAL ARTERY: ICD-10-CM

## 2018-09-13 PROCEDURE — 99204 OFFICE O/P NEW MOD 45 MIN: CPT | Mod: S$PBB,,, | Performed by: SURGERY

## 2018-09-13 PROCEDURE — 99999 PR PBB SHADOW E&M-EST. PATIENT-LVL III: CPT | Mod: PBBFAC,,, | Performed by: SURGERY

## 2018-09-13 PROCEDURE — 1101F PT FALLS ASSESS-DOCD LE1/YR: CPT | Mod: CPTII,,, | Performed by: SURGERY

## 2018-09-13 PROCEDURE — 99213 OFFICE O/P EST LOW 20 MIN: CPT | Mod: PBBFAC | Performed by: SURGERY

## 2018-09-13 NOTE — PATIENT INSTRUCTIONS
Deep Vein Thrombosis (DVT)    Deep vein thrombosis (DVT) occurs when a blood clot (thrombus) forms in a deep vein. This happens most often in the leg. It can also happen in the arms or other parts of the body. A part of the clot called an embolus can break off and travel to the lungs. When this happens, its called a pulmonary embolism (PE). PE is a medical emergency. It can cut off blood flow and lead to death. Both DVT and PE are closely related. Together, they are often referred to by the term venous thromboembolism (VTE).  Risk factors for DVT  Anything that slows blood flow, injures the lining of a vein, or increases blood clotting can make you more prone to having DVT. This includes the following:  · Long periods without movement (such as when sitting for many hours at a time or when recovering from major surgery or illness)  · Estrogen (female hormone) therapy, such as hormone replacement therapy (HRT) or oral contraceptives  · Fractured hip or leg  · Major surgery or joint replacement  · Major trauma or spinal cord injury  · Cancer  · Family history  · Excess weight or obesity  · Smoking  · Older age  Symptoms  DVT does not always cause symptoms. When symptoms do occur, they may appear around the site of the DVT, such as in the leg. Possible symptoms include:  · Swelling  · Pain  · Warmth  · Redness  · Tenderness  Home care  · You were likely prescribed blood thinners (anticoagulants). They may be given as pills (oral) or shots (injections). Follow all instructions when using these medicines. Note: Do not take blood thinners with other medicines, herbal remedies, or supplements without talking to your provider first. Certain medicines or products can affect how blood thinners work.  · Follow your providers instructions about activity and rest.  · If support or compression stockings are prescribed, wear them as directed. These may help improve blood flow in the legs.  · When sitting or lying down, move  your ankles, toes and knees often. This may also help improve blood flow in the legs.  Follow-up care  Follow up with your healthcare provider, or as advised. If imaging tests were done, they may need further review by a doctor. You will be told of any new findings that may affect your care.  When to seek medical advice  Call your healthcare provider right away if any of these occur:  · New or increased swelling, pain, tenderness, warmth, or redness, in the leg, arm, or other area  · Blood in the urine  · Bleeding with bowel movements  Call 911  Call 911 right away if any of these occur:  · Bleeding from the nose, gums, a cut, or vagina  · Heavy or uncontrolled bleeding  · Trouble breathing  · Chest pain or discomfort that worsens with deep breathing or coughing  · Coughing (may cough up blood)  · Fast heartbeat  · Sweating  · Anxiety  · Lightheadedness, dizziness, or fainting  Date Last Reviewed: 9/21/2015  © 6280-0314 [employer]. [address] [place] [address]. All rights reserved. This information is not intended as a substitute for professional medical care. Always follow your healthcare professional's instructions.        Preventing Deep Vein Thrombosis  Healthcare providers use the term venous thromboembolism (VTE) to describe two conditions, deep vein thrombosis (DVT) and pulmonary embolism (PE). They use the term VTE because the two conditions are very closely related. And, because their prevention and treatment are closely related.   DVT is a blood clot or thrombus in a deep vein. Most of these clots develop in the leg or thigh. But, they may form in a vein in the arm, or other part of the body.   Part of the blood clot may separate from the vein. This is called an embolus. It may travel to the lungs and form a pulmonary embolus. This can cut off the flow of blood to a portion of or to the entire lung. A blood clot in the lungs is a medical emergency and may cause death.  Over  time, blood clots can also permanently damage veins. They must be treated right away to prevent problems.   Risk factors  Anyone can develop a blood clot. But the following risk factors make a blood clot more likely to happen:  · Being inactive for a long period, such as when youre in the hospital, or traveling by plane or car  · Injury to a vein from an accident, a broken bone, or surgery  · Having blood clots in the past or a family history of blood clots  · Blood clotting disorder  · Recent surgery  · Cancer and certain cancer treatments  · Smoking  Other factors can also put you at higher risk for a blood clot. They include:  · Age over 60 years  · Pregnancy  · Taking birth control pills or hormone replacement  · Having other vein problems, such as varicose veins   · Being overweight  · Having a pacemaker or a central venous catheter. They increase the chance of a blood clot forming in an arm.  · Injection drug use. This also increases the chance of a blood clot forming in an arm.  How to prevent DVT  Preventing a blood clot means improving blood flow back to your heart. To help prevent a blood clot:  · Talk with your healthcare provider about a program of regular exercise.  · If your legs feel swollen or heavy, take a break and sit comfortably or lie down with your feet up.  · Keep a healthy weight.  · Quit smoking, if you smoke.  · Avoid sitting, standing, or lying down for long periods without moving your legs and feet:  ¨ When traveling by car, make frequent stops to get out and move around.  ¨ On long airplane, train, or bus rides, get up and move around when possible.  ¨ If you cant get up, wiggle your toes and tighten your calves to keep your blood moving, as pictured below.  If you need to have surgery, talk with your healthcare provider about a plan to prevent blood clots.   If you are in the hospital, your risk for blood clots increases. Your healthcare provider may prescribe  an anticoagulant medicine or a blood thinner to help prevent blood clots. Or your healthcare provider may prescribe a sequential compression device (SCD) or intermittent pneumatic compression (IPC). The device has sleeves that fit around your legs. It applies gentle pressure to help with blood flow and prevent blood clots. Remove the sleeves so that you do not trip or fall when you are walking, like when you use the bathroom or shower. If you need help removing the sleeves, ask the nurse or aid. You may also want to try the following:    When to seek immediate medical attention  If you have symptoms of a blood clot in your lungs, call 911 or get emergency help. The symptoms are:  · Chest pain  · Trouble breathing  · Fast heartbeat  · Coughing (may cough up blood)  · Sweating  · Fainting  When to call your healthcare provider  If you have symptoms of a blood clot, call your healthcare provider. The symptoms are:  · Pain  · Swelling  · Redness or discoloration in a leg, arm, or other area   Date Last Reviewed: 5/1/2016  © 4392-4488 [employer]. [address] [place] [address]. All rights reserved. This information is not intended as a substitute for professional medical care. Always follow your healthcare professional's instructions.        Procedures for Deep Vein Thrombosis  A deep vein thrombosis (DVT) is a blood clot in a large vein deep inside a leg, arm, or other part of the body. The clot can separate from the vein and travel to the lungs. This forms a pulmonary embolism (PE). In the lungs, the clot can cut off the flow of blood. This is a medical emergency and may cause death.  Healthcare providers use the term venous thromboembolism (VTE) to describe the two conditions, DVT and PE. They use the term VTE because the two conditions are very closely related. And, because their prevention and treatment are closely related.  Most often, a blood clot is treated with medicines that help to  dissolve the clot. They also help to prevent pulmonary embolism and other complications. But this is not true for everyone. Depending on your health, and the size and location of the blood clot, your healthcare provider may recommend that you have one or more procedures. Procedures to treat blood clots include thrombolysis, angioplasty, and vena cava filter placement. Your healthcare provider can give you more information about treating your blood clot, including information about these and other treatments. He or she can also answer any questions you may have.    Thrombolysis  This procedure is used to dissolve a large clot. A catheter (thin tube) is inserted into the vein. X-rays are taken of the vein and the clot. Then, clot-dissolving medicine is delivered to the clot through the catheter. In some cases, a mechanical device is also used to break up the clot. This procedure is not recommended for everyone with a DVT. Your healthcare provider will review the risks and benefits with you. In some people, thrombolysis is a very effective treatment for blood clots. However, it does carry the risk for serious bleeding complications.  Angioplasty  This procedure may be used to widen the affected vein and improve blood flow. This is done after the blood clot has been dissolved. Narrowing (stenosis) of the vein can block blood flow and make it more likely for a blood clot to form. A catheter with a balloon on the end is inserted into the affected vein. X-rays are used to position the catheter. Once the catheter is in place, the balloon is inflated to widen your vein. In some cases, a wire mesh device, called a stent, may also be placed in your vein to help keep it open. You and your healthcare provider can discuss whether or not this procedure might help you.  Inferior vena cava filter  An inferior vena cava (IVC) filter is a small device used to trap an embolus in your lower body to prevent it from traveling to your  lungs. A long, narrow tube (catheter) is put into one of your veins. It is used to place the filter in your vena cava, your bodys largest vein. Your healthcare provider will discuss the risks and benefits of this procedure if it is recommended for you.  Date Last Reviewed: 5/1/2016  © 9993-2487 [employer]. [address] [place] [address], [place] [address]. All rights reserved. This information is not intended as a substitute for professional medical care. Always follow your healthcare professional's instructions.

## 2018-09-13 NOTE — LETTER
September 13, 2018      Scotty Landin MD  [address] [facility] [address]  [address]  [facility] [address]           [facility]  [address] [facility] [address]  [facility] [address]  Phone: [phone]  Fax: [phone]          Patient: Patito Mcintyre   MR Number: [id number]   YOB: 1925   Date of Visit: 9/13/2018       Dear Dr. Scotty Landin:    Thank you for referring Patito Mcintyre to me for evaluation. Attached you will find relevant portions of my assessment and plan of care.    If you have questions, please do not hesitate to call me. I look forward to following Patito Mcintyre along with you.    Sincerely,    Demond Miller MD    Enclosure  CC:  No Recipients    If you would like to receive this communication electronically, please contact [email or web address] or [phone] to request more information on [employer] Link access.    For providers and/or their staff who would like to refer a patient to [facility], please contact us through our one-stop-shop provider referral line, [facility] , at [phone].    If you feel you have received this communication in error or would no longer like to receive these types of communications, please e-mail [email or web address]

## 2018-09-13 NOTE — PROGRESS NOTES
Demond Miller MD [facility]                         09/13/2018    HPI:  Patito Mcintyre is a 89 y.o. female with   Patient Active Problem List   Diagnosis    Osteoporosis, postmenopausal    Essential tremor    Macular degeneration of left eye    Osteoarthritis    Varicose veins    History of polymyalgia rheumatica    Overweight (BMI 25.0-29.9)    Type 2 diabetes, controlled, with neuropathy    Post herpetic neuralgia    Chronic low back pain    Benign hypertension with chronic kidney disease, stage III    Hyperlipidemia    GERD (gastroesophageal reflux disease)    Postmenopausal atrophic vaginitis    Atherosclerosis of abdominal aorta    Stenosis of abdominal aorta    Mild carotid artery disease    Peripheral vascular disease    History of DVT (deep vein thrombosis)    Recurrent UTI    OAB (overactive bladder)    Left posterior capsular opacification    Secondary hyperparathyroidism of renal origin    Acute deep vein thrombosis (DVT) of distal vein of left lower extremity    being managed by PCP and specialists who is here today for evaluation of LLE DVT, KAREEM and SMA/L renal artery atherosclerosis.  Pt has had 3 DVT of LLE.  S/p BLE saphenous stripping 50 yrs ago.  Patient states location is LLE occurring for several years.  Associated signs and symptoms include edema, varicose veins.  Symptoms began after first born child.  Alleviating factors include elevation.  Worsening factors include dependency.  No wounds to BLE.  No TIA or CVA. Functional status unlimited.  No symptoms of chronic mesenteric ischemia.    no MI  no Stroke  Tobacco use: no    Past Medical History:   Diagnosis Date    Atherosclerosis of abdominal aorta     noted on CT scan 10/27/2015    Back pain     BPPV (benign paroxysmal positional vertigo)     Cancer     skin cancer    Chronic kidney disease, stage III (moderate)     Dependent edema     Diabetes with  neurologic complications     DVT (deep venous thrombosis)     Essential tremor     Hearing loss     with hearing aides 1/2014    History of shingles 2017    Hyperlipidemia LDL goal <100     Hypertension     Left posterior capsular opacification 3/23/2017    Macular degeneration of left eye     Macular hole     od    Mild carotid artery disease     Mild hyperparathyroidism     Osteoarthritis     Osteoporosis, postmenopausal     Overweight(278.02)     Peripheral vascular disease     Polymyalgia rheumatica     followed by rheumatology, Dr. Mendez    Stenosis of abdominal aorta     noted on CT scan 10/27/2015; probable high-grade stenosis at the origin of the SMA and left renal arteries    Type II or unspecified type diabetes mellitus without mention of complication, not stated as uncontrolled     Urinary incontinence     Varicose veins      Past Surgical History:   Procedure Laterality Date    bilateral cataract surgery      bilateral leg vein dilation      CATARACT EXTRACTION      ou    left knee arthroscopy      left macular degeneration eye surgery      right knee surgery      SKIN CANCER EXCISION  2002, 2017    Basil Cell; face    TOTAL ABDOMINAL HYSTERECTOMY      YAG Laser Capsulotomy Left 03/23/2017    Dr. Faulkner     Family History   Problem Relation Age of Onset    Hypertension Unknown     Edema Unknown     Breast cancer Sister     No Known Problems Mother     No Known Problems Father     No Known Problems Brother     No Known Problems Maternal Aunt     No Known Problems Maternal Uncle     No Known Problems Paternal Aunt     No Known Problems Paternal Uncle     No Known Problems Maternal Grandmother     No Known Problems Maternal Grandfather     No Known Problems Paternal Grandmother     No Known Problems Paternal Grandfather     Amblyopia Neg Hx     Blindness Neg Hx     Cataracts Neg Hx     Diabetes Neg Hx     Glaucoma Neg Hx     Macular degeneration Neg Hx      Retinal detachment Neg Hx     Strabismus Neg Hx     Stroke Neg Hx     Thyroid disease Neg Hx     Cancer Neg Hx      Social History     Socioeconomic History    Marital status:      Spouse name: Not on file    Number of children: Not on file    Years of education: Not on file    Highest education level: Not on file   Social Needs    Financial resource strain: Not on file    Food insecurity - worry: Not on file    Food insecurity - inability: Not on file    Transportation needs - medical: Not on file    Transportation needs - non-medical: Not on file   Occupational History    Not on file   Tobacco Use    Smoking status: Never Smoker    Smokeless tobacco: Never Used   Substance and Sexual Activity    Alcohol use: No    Drug use: No    Sexual activity: No   Other Topics Concern    Not on file   Social History Narrative    She lives by herself. She is originally from [place].       Current Outpatient Medications:     aspirin (ECOTRIN) 81 MG EC tablet, Take 1 tablet by mouth Daily. Pt. Take orange flavored chewable tablet., Disp: , Rfl:     atorvastatin (LIPITOR) 40 MG tablet, TAKE 1 TABLET BY MOUTH EVERY EVENING, Disp: 90 tablet, Rfl: 3    CALCIUM CARBONATE/VITAMIN D3 (OS-WENDY 500 + D3 ORAL), Take by mouth., Disp: , Rfl:     losartan (COZAAR) 50 MG tablet, Take 1 tablet (50 mg total) by mouth once daily., Disp: 90 tablet, Rfl: 3    OMEGA-3S/DHA/EPA/FISH OIL (OMEGA 3 ORAL), Take 550 mg by mouth. 1 Capsule Oral Every day, Disp: , Rfl:     rivaroxaban (XARELTO) 20 mg Tab, Take 1 tablet (20 mg total) by mouth once daily., Disp: 90 tablet, Rfl: 3    vitamin E 200 UNIT capsule, Take 200 Units by mouth once daily., Disp: , Rfl:     ACETAMINOPHEN (TYLENOL ORAL), Take 1 tablet by mouth as needed., Disp: , Rfl:     GLUCOSAMINE HCL/CHONDR LEARY A NA (OSTEO BI-FLEX ORAL), Take 1 tablet by mouth once daily., Disp: , Rfl:     omeprazole (PRILOSEC) 20 MG capsule, TAKE 1 CAPSULE BY MOUTH TWICE DAILY,  Disp: 180 capsule, Rfl: 1    REVIEW OF SYSTEMS:  General: No fevers or chills; ENT: No sore throat; Allergy and Immunology: no persistent infections; Hematological and Lymphatic: No history of bleeding or easy bruising; Endocrine: negative; Respiratory: no cough, shortness of breath, or wheezing; Cardiovascular: no chest pain or dyspnea on exertion; Gastrointestinal: no abdominal pain/back, change in bowel habits, or bloody stools; Genito-Urinary: no dysuria, trouble voiding, or hematuria; Musculoskeletal: negative; Neurological: no TIA or stroke symptoms; Psychiatric: no nervousness, anxiety or depression.    PHYSICAL EXAM:   Left Arm BP - Sittin/78 (18 1131)  Pulse: 81         General appearance:  Alert, well-appearing, and in no distress.  Oriented to person, place, and time                    Neurological: Normal speech, no focal findings noted; CN II - XII grossly intact. RLE with sensation to light touch, LLE with sensation to light touch.            Musculoskeletal: Digits/nail without cyanosis/clubbing.  Strength 5/5 BLE.                    Neck: Supple, no significant adenopathy                  Chest:  Clear to auscultation, no wheezes, rales or rhonchi, symmetric air entry. No use of accessory muscles               Cardiac: Normal rate and regular rhythm, S1 and S2 normal            Abdomen: Soft, nontender, nondistended, no masses or organomegaly, no hernia     No rebound tenderness noted; bowel sounds normal     No groin adenopathy      Extremities:   2+ R femoral pulse, 2+ L femoral pulse     1+ R popliteal pulse, 1+ L popliteal pulse     1+ R PT pulse, 1+ L PT pulse     1+ R DP pulse, 1+ L DP pulse     2+ RLE edema, 2+ LLE edema    Skin: RLE without ulcer; LLE without ulcer    CEAP 3/3    LAB RESULTS:  No results found for: CBC  Lab Results   Component Value Date    LABPROT 10.8 2016    INR 1.0 2016     Lab Results   Component Value Date     2018    K 4.7 2018      06/24/2018    CO2 20 (L) 06/24/2018    GLU 88 06/24/2018    BUN 19 06/24/2018    CREATININE 1.0 06/24/2018    CALCIUM 9.7 06/24/2018    ANIONGAP 13 06/24/2018    EGFRNONAA 49 (A) 06/24/2018     Lab Results   Component Value Date    WBC 7.00 06/24/2018    RBC 4.59 06/24/2018    HGB 14.6 06/24/2018    HCT 43.8 06/24/2018    MCV 95 06/24/2018    MCH 31.8 (H) 06/24/2018    MCHC 33.3 06/24/2018    RDW 13.4 06/24/2018     (L) 06/24/2018    MPV 10.0 06/24/2018    GRAN 3.9 06/24/2018    GRAN 55.2 06/24/2018    LYMPH 1.7 06/24/2018    LYMPH 24.9 06/24/2018    MONO 1.0 06/24/2018    MONO 14.0 06/24/2018    EOS 0.4 06/24/2018    BASO 0.02 06/24/2018    EOSINOPHIL 5.3 06/24/2018    BASOPHIL 0.3 06/24/2018    DIFFMETHOD Automated 06/24/2018     .  Lab Results   Component Value Date    HGBA1C 6.0 (H) 02/19/2018       IMAGING:  All pertinent imaging has been reviewed and interpreted independently.    LLE DVT femoral vein to PT vein    Carotid US <50% stenosis bilaterally    CT A/P non contrast with calcifications of SMA and L renal origin    IMP/PLAN:  89 y.o. female with   Patient Active Problem List   Diagnosis    Osteoporosis, postmenopausal    Essential tremor    Macular degeneration of left eye    Osteoarthritis    Varicose veins    History of polymyalgia rheumatica    Overweight (BMI 25.0-29.9)    Type 2 diabetes, controlled, with neuropathy    Post herpetic neuralgia    Chronic low back pain    Benign hypertension with chronic kidney disease, stage III    Hyperlipidemia    GERD (gastroesophageal reflux disease)    Postmenopausal atrophic vaginitis    Atherosclerosis of abdominal aorta    Stenosis of abdominal aorta    Mild carotid artery disease    Peripheral vascular disease    History of DVT (deep vein thrombosis)    Recurrent UTI    OAB (overactive bladder)    Left posterior capsular opacification    Secondary hyperparathyroidism of renal origin    Acute deep vein thrombosis (DVT)  of distal vein of left lower extremity    being managed by PCP and specialists who is here today for evaluation of LLE DVT and CT findings with concern for SMA and L renal artery stenosis.    -LLE DVT unprovoked with h/o 3 DVT and treatment completed - agree with Hematology recs and anticoagulation; no iliac involvement or findings of phlegmasia on exam warranting venogram to evaluate for iliac vein compression.  If symptoms worsen or leg becomes compromised, will eval further for venogram and iliac stenting  -recommend compression with Rx stockings, elevation, dietary changes associated with water and sodium intake discussed at length with patient  -If edema or varicose veins become symptomatic - rec eval for venous insufficiency and instructed pt to contact my office to discuss venous procedure options  -Carotid stenosis being followed by Dr. Landin  -CT incidental findings of atherosclerotic disease of SMA and L renal artery origin without symptoms of CMI or LYN - no vascular surgical intervention warranted at this time.  No further workup indicated unless pt develops symptoms.  -RTC prn    I spent 30 minutes evaluating this patient and greater than 50% of the time was spent counseling, coordinator care and discussing the plan of care.  All questions were answered and patient stated understanding with agreement with the above treatment plan.    Demond Miller MD [facility]  Vascular and Endovascular Surgery

## 2018-10-29 ENCOUNTER — OFFICE VISIT (OUTPATIENT)
Dept: FAMILY MEDICINE | Facility: CLINIC | Age: 83
End: 2018-10-29
Payer: MEDICARE

## 2018-10-29 VITALS
OXYGEN SATURATION: 96 % | HEART RATE: 104 BPM | WEIGHT: 178.25 LBS | SYSTOLIC BLOOD PRESSURE: 130 MMHG | BODY MASS INDEX: 30.43 KG/M2 | DIASTOLIC BLOOD PRESSURE: 78 MMHG | HEIGHT: 64 IN | TEMPERATURE: 98 F

## 2018-10-29 DIAGNOSIS — I70.0 ATHEROSCLEROSIS OF ABDOMINAL AORTA: ICD-10-CM

## 2018-10-29 DIAGNOSIS — N18.30 BENIGN HYPERTENSION WITH CHRONIC KIDNEY DISEASE, STAGE III: ICD-10-CM

## 2018-10-29 DIAGNOSIS — K21.9 GASTROESOPHAGEAL REFLUX DISEASE WITHOUT ESOPHAGITIS: Chronic | ICD-10-CM

## 2018-10-29 DIAGNOSIS — E11.40 TYPE 2 DIABETES, CONTROLLED, WITH NEUROPATHY: Primary | ICD-10-CM

## 2018-10-29 DIAGNOSIS — I12.9 BENIGN HYPERTENSION WITH CHRONIC KIDNEY DISEASE, STAGE III: ICD-10-CM

## 2018-10-29 DIAGNOSIS — E78.2 MIXED HYPERLIPIDEMIA: Chronic | ICD-10-CM

## 2018-10-29 DIAGNOSIS — Z86.718 HISTORY OF DVT (DEEP VEIN THROMBOSIS): Chronic | ICD-10-CM

## 2018-10-29 DIAGNOSIS — N25.81 SECONDARY HYPERPARATHYROIDISM OF RENAL ORIGIN: ICD-10-CM

## 2018-10-29 DIAGNOSIS — Z23 NEED FOR INFLUENZA VACCINATION: ICD-10-CM

## 2018-10-29 DIAGNOSIS — M81.0 OSTEOPOROSIS, POSTMENOPAUSAL: ICD-10-CM

## 2018-10-29 PROBLEM — I82.4Z2 ACUTE DEEP VEIN THROMBOSIS (DVT) OF DISTAL VEIN OF LEFT LOWER EXTREMITY: Status: RESOLVED | Noted: 2018-08-16 | Resolved: 2018-10-29

## 2018-10-29 PROCEDURE — 99214 OFFICE O/P EST MOD 30 MIN: CPT | Mod: PBBFAC,PO | Performed by: INTERNAL MEDICINE

## 2018-10-29 PROCEDURE — 99999 PR PBB SHADOW E&M-EST. PATIENT-LVL IV: CPT | Mod: PBBFAC,,, | Performed by: INTERNAL MEDICINE

## 2018-10-29 PROCEDURE — 1101F PT FALLS ASSESS-DOCD LE1/YR: CPT | Mod: CPTII,,, | Performed by: INTERNAL MEDICINE

## 2018-10-29 PROCEDURE — 90662 IIV NO PRSV INCREASED AG IM: CPT | Mod: PBBFAC,PO

## 2018-10-29 PROCEDURE — 99215 OFFICE O/P EST HI 40 MIN: CPT | Mod: S$PBB,,, | Performed by: INTERNAL MEDICINE

## 2018-10-29 NOTE — PROGRESS NOTES
HISTORY OF PRESENT ILLNESS:  Patito Mcintyre is a 89 y.o. female who presents to the clinic today for Diabetes  .   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  She has osteoporosis and is overdue for her Reclast infusion.  She continues with stable essential tremor.  She is requesting handicap tag.  She had a 3rd blood clot in her left lower extremity in August.  She has seen vascular surgery, Cardiology, and Hematology who all recommend her to stay on Eliquis as long as it will continue to give her benefits.  She still lives by herself and occasionally drives.  She is requesting Meals on wheels.  Diabetes: The patient reports that they  do not check blood sugars at home. The patient  denies any problems with low blood sugars. The patient  reports that they have been complaint with current treatment plan (diet, exercise, medication).  Hypertension: The patient reports that they check their blood pressures - not done The patient  is not enrolled in the digital hypertension program. The patient denies  cardiac chest pain, shortness of breath, headaches and side effects of medication. The patient reports problems with  none. The patient  has been compliant with the current medication regimen.  The patient : does follow a lot salt diet.  Hyperlipidemia: Patient reports that they have been compliant with low fat diet and regular exercise. Patient reports that they have been compliant with their medication regimen and deny any problems/side effects from the medication.        PAST MEDICAL HISTORY:  Past Medical History:   Diagnosis Date    Atherosclerosis of abdominal aorta     noted on CT scan 10/27/2015    Back pain     BPPV (benign paroxysmal positional vertigo)     Cancer     skin cancer    Chronic kidney  disease, stage III (moderate)     Dependent edema     Diabetes with neurologic complications     DVT (deep venous thrombosis)     Essential tremor     Hearing loss     with hearing aides 1/2014    History of shingles 2017    Hyperlipidemia LDL goal <100     Hypertension     Left posterior capsular opacification 3/23/2017    Macular degeneration of left eye     Macular hole     od    Mild carotid artery disease     Mild hyperparathyroidism     Osteoarthritis     Osteoporosis, postmenopausal     Overweight(278.02)     Peripheral vascular disease     Polymyalgia rheumatica     followed by rheumatology, Dr. Mendez    Stenosis of abdominal aorta     noted on CT scan 10/27/2015; probable high-grade stenosis at the origin of the SMA and left renal arteries    Type II or unspecified type diabetes mellitus without mention of complication, not stated as uncontrolled     Urinary incontinence     Varicose veins        PAST SURGICAL HISTORY:  Past Surgical History:   Procedure Laterality Date    bilateral cataract surgery      bilateral leg vein dilation      CATARACT EXTRACTION      ou    left knee arthroscopy      left macular degeneration eye surgery      right knee surgery      SKIN CANCER EXCISION  2002, 2017    [facility] Cell; face    TOTAL ABDOMINAL HYSTERECTOMY      YAG Laser Capsulotomy Left 03/23/2017    Dr. Faulkner       SOCIAL HISTORY:  Social History     Socioeconomic History    Marital status:      Spouse name: Not on file    Number of children: Not on file    Years of education: Not on file    Highest education level: Not on file   Social Needs    Financial resource strain: Not on file    Food insecurity - worry: Not on file    Food insecurity - inability: Not on file    Transportation needs - medical: Not on file    Transportation needs - non-medical: Not on file   Occupational History    Not on file   Tobacco Use    Smoking status: Never Smoker    Smokeless  tobacco: Never Used   Substance and Sexual Activity    Alcohol use: No    Drug use: No    Sexual activity: No   Other Topics Concern    Not on file   Social History Narrative    She lives by herself. She is originally from [place].       FAMILY HISTORY:  Family History   Problem Relation Age of Onset    Hypertension Unknown     Edema Unknown     Breast cancer Sister     No Known Problems Mother     No Known Problems Father     No Known Problems Brother     No Known Problems Maternal Aunt     No Known Problems Maternal Uncle     No Known Problems Paternal Aunt     No Known Problems Paternal Uncle     No Known Problems Maternal Grandmother     No Known Problems Maternal Grandfather     No Known Problems Paternal Grandmother     No Known Problems Paternal Grandfather     Amblyopia Neg Hx     Blindness Neg Hx     Cataracts Neg Hx     Diabetes Neg Hx     Glaucoma Neg Hx     Macular degeneration Neg Hx     Retinal detachment Neg Hx     Strabismus Neg Hx     Stroke Neg Hx     Thyroid disease Neg Hx     Cancer Neg Hx        ALLERGIES AND MEDICATIONS: updated and reviewed.  Review of patient's allergies indicates:   Allergen Reactions    Inderal [propranolol]     Indomethacin sodium      Other reaction(s): Vomiting  Other reaction(s): Nausea        Medication List           Accurate as of 10/29/18  9:44 AM. If you have any questions, ask your nurse or doctor.               CONTINUE taking these medications    aspirin 81 MG EC tablet  Commonly known as:  ECOTRIN     atorvastatin 40 MG tablet  Commonly known as:  LIPITOR  TAKE 1 TABLET BY MOUTH EVERY EVENING     losartan 50 MG tablet  Commonly known as:  COZAAR  Take 1 tablet (50 mg total) by mouth once daily.     OMEGA 3 ORAL     omeprazole 20 MG capsule  Commonly known as:  PRILOSEC  TAKE 1 CAPSULE BY MOUTH TWICE DAILY     OS-WENDY 500 + D3 ORAL     OSTEO BI-FLEX ORAL     rivaroxaban 20 mg Tab  Commonly known as:  XARELTO  Take 1 tablet (20 mg  "total) by mouth once daily.     TYLENOL ORAL     vitamin E 200 UNIT capsule               CARE TEAM:  Patient Care Team:  Margaret Stern MD as PCP - General (Internal Medicine)  Maximino Martell MD as Consulting Physician (Plastic Surgery)  Scotty Landin MD as Consulting Physician (Cardiology)  Seema Alejo MD as Consulting Physician (Urology)  Javon Garza MD as Consulting Physician (Hematology and Oncology)         REVIEW OF SYSTEMS:  Review of Systems   Constitutional: Negative for chills, fatigue, fever and unexpected weight change.   HENT: Negative for congestion, ear discharge, ear pain and postnasal drip.    Eyes: Negative for photophobia, pain and visual disturbance.   Respiratory: Negative for cough, shortness of breath and wheezing.    Cardiovascular: Positive for leg swelling (left>right (although overall improved)). Negative for chest pain and palpitations.   Gastrointestinal: Negative for abdominal pain, constipation, diarrhea, nausea and vomiting.   Genitourinary: Negative for dysuria, frequency, urgency and vaginal discharge.   Musculoskeletal: Positive for gait problem (- due to aging). Negative for back pain, joint swelling and neck stiffness.   Skin: Negative for rash.   Neurological: Positive for tremors. Negative for weakness and headaches.   Psychiatric/Behavioral: Negative for dysphoric mood and sleep disturbance. The patient is not nervous/anxious.          PHYSICAL EXAM:   Vitals:    10/29/18 0846   BP: 130/78   Pulse: 104   Temp: 98.3 °F (36.8 °C)     Weight: 80.8 kg (178 lb 3.9 oz)   Height: 5' 4" (162.6 cm)   Body mass index is 30.6 kg/m².     General appearance - alert, well appearing, and in no distress and obese  Mental status - alert, oriented to person, place, and time, normal mood, behavior, speech, dress, motor activity, and thought processes  Eyes - pupils equal and reactive, extraocular eye movements intact, sclera anicteric  Mouth - mucous membranes " moist, pharynx normal without lesions  Neck - supple, no significant adenopathy, carotids upstroke normal bilaterally, no bruits  Lymphatics - no palpable lymphadenopathy  Chest - clear to auscultation, no wheezes, rales or rhonchi, symmetric air entry  Heart - normal rate and regular rhythm, no gallops noted  Back exam - mild limited range of motion  Neurological - alert, oriented, normal speech, no focal findings noted, abnormal findings: tremor  Musculoskeletal - no muscular tenderness noted, Moderate osteoarthritic changes noted to both knee joints. No joint effusions noted.   Extremities - pedal edema trace + (left>right), varicose veins noted - worse on left  Skin - normal coloration and turgor, no rashes, no suspicious skin lesions noted      Protective Sensation (w/ 10 gram monofilament):  Right: Decreased  Left: Decreased    Visual Inspection:  mild hammertoe bilaterally; moderate-severe varicose veins on left    Pedal Pulses:   Right: Present  Left: Present    Posterior tibialis:   Right:Present  Left: Present         ASSESSMENT AND PLAN:  1. Type 2 diabetes, controlled, with neuropathy  Diabetes currently is controlled for age and comorbid conditions. We discussed diabetic diet and regular exercise. We discussed home blood sugar monitoring, if appropriate. Continue current medication regimen. and We discussed low sugar/low carbohydrate diet and regular exercise to prevent progression. No need for prescription medication at this time.  Diabetic complications addressed: Neuropathy pain controlled.  Patient was counseled on the need for yearly diabetic retinopathy exam and yearly diabetic foot exam.   - Hemoglobin A1c; Future  - Ambulatory referral to Outpatient Case Management    2. Benign hypertension with chronic kidney disease, stage III  Discussed sodium restriction, maintaining ideal body weight and regular exercise program as physiologic means to achieve blood pressure control. The patient will strive  towards this. The current medical regimen is effective;  continue present plan and medications. Recommended patient to check home readings to monitor and see me for followup as scheduled or sooner as needed. Patient was educated that both decongestant and anti-inflammatory medication may raise blood pressure. Stable decreased kidney function. Observe. Patient counseled to avoid/minimize the use of anti-inflammatory  Medication. Discussed to stay well hydrated. Also discussed with patient that good control of blood pressure and/or diabetes, if present, will help to prevent progression.     3. Mixed hyperlipidemia  We discussed low fat diet and regular exercise.The current medical regimen is effective;  continue present plan and medications.    - Lipid panel; Future    4. Osteoporosis, postmenopausal  We discussed adequate calcium and vitamin D supplementation. We discussed fall precautions. She is up to date on her BMD. Continue current treatment regimen (overdue for Reclast infusion)     5. Atherosclerosis of abdominal aorta  Patient with Atherosclerosis of the Aorta.  Stable/asymptomatic. Currently stable on lipid lowering medication and b/p monitoring.     6. Secondary hyperparathyroidism of renal origin  Stable. Asymptomatic. Observe.     7. Gastroesophageal reflux disease without esophagitis  Symptoms controlled: yes. Reflux precautions discussed (eliminate tobacco if a smoker; minimize caffeine, chocolate and red/white peppermint intake; avoid heavy and spicy meals; don't lay down within 2-3 hours after eating; minimize the intake of NSAIDs). Medication as needed. Patient asked to take medication breaks, if possible - discussed chronic use can limit calcium absorption (which can lead to osteopenia/osteoporosis), increases the risk for intestinal infections, and can cause kidney damage. There are also some newer studies that show possible increased risk of mortality.     8. Need for influenza vaccination    -  Influenza - High Dose (65+) (PF) (IM)    9. History of DVT (deep vein thrombosis)  Lower extremity edema improved.  Continue lifelong Eliquis as long as benefits outweigh risks.           Follow-up in about 6 months (around 4/29/2019), or if symptoms worsen or fail to improve, for annual exam. or sooner as needed.

## 2018-10-30 ENCOUNTER — TELEPHONE (OUTPATIENT)
Dept: FAMILY MEDICINE | Facility: CLINIC | Age: 83
End: 2018-10-30

## 2018-10-30 ENCOUNTER — OUTPATIENT CASE MANAGEMENT (OUTPATIENT)
Dept: ADMINISTRATIVE | Facility: OTHER | Age: 83
End: 2018-10-30

## 2018-10-30 ENCOUNTER — DOCUMENTATION ONLY (OUTPATIENT)
Dept: INFUSION THERAPY | Facility: HOSPITAL | Age: 83
End: 2018-10-30

## 2018-10-30 NOTE — PROGRESS NOTES
Attempt #:  1  This LMSW attempted to reach patient/caregiver to provide resource. No answer machine. LMSW will attempt again

## 2018-10-30 NOTE — TELEPHONE ENCOUNTER
----- Message from Glen Pires LCSW-BACS sent at 10/30/2018  9:33 AM CDT -----  Thank you for the referral.  Patient has been assigned to Sally Frazier LMSW for low risk screening for Outpatient Case Management.     Reason for referral: Patient is interested in meals on wheels    Please contact [facility] at [phone] with any questions.    Thank you,    Glen Pires LCSW    Outpatient Complex Care Management.

## 2018-10-30 NOTE — PROGRESS NOTES
1120 called patient's house to schedule Reclast infusion. No voicemail set up    1125 left voicemail on daughter's cell phone

## 2018-10-30 NOTE — PROGRESS NOTES
Thank you for the referral.  Patient has been assigned to Sally Frazier LMSW for low risk screening for Outpatient Case Management.     Reason for referral: Patient is interested in meals on wheels    Please contact [facility] at ext. [phone] with any questions.    Thank you,    Glen Dempsey LCSW    Outpatient Complex Care Management.

## 2018-10-31 ENCOUNTER — TELEPHONE (OUTPATIENT)
Dept: FAMILY MEDICINE | Facility: CLINIC | Age: 83
End: 2018-10-31

## 2018-10-31 ENCOUNTER — OUTPATIENT CASE MANAGEMENT (OUTPATIENT)
Dept: ADMINISTRATIVE | Facility: OTHER | Age: 83
End: 2018-10-31

## 2018-10-31 NOTE — TELEPHONE ENCOUNTER
Noted. Please make sure patient is set up with this service. (the connection did not work Monday when the patient was in the office).

## 2018-10-31 NOTE — PROGRESS NOTES
This LMSW received a referral on the above patient.   Reason for referral:Meals on Wheels  Name of the community resource that was provided:[facility]  Resource given to: Patient  via Telephone    This LMSW contacted patient regarding referral. Patient reports no needs except for meals on wheels. LMSW referred patient to [facility] for Meals on Wheels. Patient understands there is a wait list for services.  LMSW will close case at this time.

## 2018-10-31 NOTE — TELEPHONE ENCOUNTER
----- Message from Sally Frazier LMSW sent at 10/31/2018  2:16 PM CDT -----  This SW received a referral on the above patient. This SW provided patient with the following resource: [employer] ( Meals on Wheels). The resource can be located within [facility] under the food category.    Thank you for the referral,    Sally Frazier LMSW

## 2018-11-01 ENCOUNTER — LAB VISIT (OUTPATIENT)
Dept: LAB | Facility: HOSPITAL | Age: 83
End: 2018-11-01
Attending: INTERNAL MEDICINE
Payer: MEDICARE

## 2018-11-01 DIAGNOSIS — E11.40 TYPE 2 DIABETES, CONTROLLED, WITH NEUROPATHY: ICD-10-CM

## 2018-11-01 DIAGNOSIS — E78.2 MIXED HYPERLIPIDEMIA: Chronic | ICD-10-CM

## 2018-11-01 LAB
CHOLEST SERPL-MCNC: 132 MG/DL
CHOLEST/HDLC SERPL: 3.1 {RATIO}
ESTIMATED AVG GLUCOSE: 126 MG/DL
HBA1C MFR BLD HPLC: 6 %
HDLC SERPL-MCNC: 43 MG/DL
HDLC SERPL: 32.6 %
LDLC SERPL CALC-MCNC: 71.2 MG/DL
NONHDLC SERPL-MCNC: 89 MG/DL
TRIGL SERPL-MCNC: 89 MG/DL

## 2018-11-01 PROCEDURE — 80061 LIPID PANEL: CPT

## 2018-11-01 PROCEDURE — 36415 COLL VENOUS BLD VENIPUNCTURE: CPT | Mod: PO

## 2018-11-01 PROCEDURE — 83036 HEMOGLOBIN GLYCOSYLATED A1C: CPT

## 2018-11-06 ENCOUNTER — INFUSION (OUTPATIENT)
Dept: INFUSION THERAPY | Facility: HOSPITAL | Age: 83
End: 2018-11-06
Attending: INTERNAL MEDICINE
Payer: MEDICARE

## 2018-11-06 VITALS
SYSTOLIC BLOOD PRESSURE: 115 MMHG | TEMPERATURE: 98 F | HEART RATE: 94 BPM | DIASTOLIC BLOOD PRESSURE: 62 MMHG | RESPIRATION RATE: 16 BRPM | OXYGEN SATURATION: 95 %

## 2018-11-06 DIAGNOSIS — M81.0 OSTEOPOROSIS, POSTMENOPAUSAL: Primary | ICD-10-CM

## 2018-11-06 PROCEDURE — 96374 THER/PROPH/DIAG INJ IV PUSH: CPT

## 2018-11-06 PROCEDURE — 63600175 PHARM REV CODE 636 W HCPCS: Performed by: INTERNAL MEDICINE

## 2018-11-06 RX ORDER — ZOLEDRONIC ACID 5 MG/100ML
5 INJECTION, SOLUTION INTRAVENOUS
Status: CANCELLED | OUTPATIENT
Start: 2018-11-06

## 2018-11-06 RX ORDER — ZOLEDRONIC ACID 5 MG/100ML
5 INJECTION, SOLUTION INTRAVENOUS
Status: COMPLETED | OUTPATIENT
Start: 2018-11-06 | End: 2018-11-06

## 2018-11-06 RX ADMIN — ZOLEDRONIC ACID 5 MG: 5 INJECTION, SOLUTION INTRAVENOUS at 08:11

## 2018-11-06 NOTE — PLAN OF CARE
Problem: Patient Care Overview (Adult)  Goal: Plan of Care Review  Outcome: Ongoing (interventions implemented as appropriate)  Tolerated Reclast. No complaints voiced.

## 2019-02-05 ENCOUNTER — OFFICE VISIT (OUTPATIENT)
Dept: CARDIOLOGY | Facility: CLINIC | Age: 84
End: 2019-02-05
Payer: MEDICARE

## 2019-02-05 VITALS
OXYGEN SATURATION: 95 % | SYSTOLIC BLOOD PRESSURE: 126 MMHG | DIASTOLIC BLOOD PRESSURE: 62 MMHG | RESPIRATION RATE: 15 BRPM | HEIGHT: 64 IN | HEART RATE: 102 BPM | BODY MASS INDEX: 30.39 KG/M2 | WEIGHT: 178 LBS

## 2019-02-05 DIAGNOSIS — E78.2 MIXED HYPERLIPIDEMIA: ICD-10-CM

## 2019-02-05 DIAGNOSIS — I10 ESSENTIAL HYPERTENSION: ICD-10-CM

## 2019-02-05 DIAGNOSIS — I73.9 PVD (PERIPHERAL VASCULAR DISEASE): Primary | ICD-10-CM

## 2019-02-05 DIAGNOSIS — E11.40 TYPE 2 DIABETES, CONTROLLED, WITH NEUROPATHY: ICD-10-CM

## 2019-02-05 DIAGNOSIS — R06.02 SOB (SHORTNESS OF BREATH): ICD-10-CM

## 2019-02-05 DIAGNOSIS — I87.2 CHRONIC VENOUS INSUFFICIENCY: ICD-10-CM

## 2019-02-05 DIAGNOSIS — G25.0 ESSENTIAL TREMOR: ICD-10-CM

## 2019-02-05 PROCEDURE — 99999 PR PBB SHADOW E&M-EST. PATIENT-LVL III: CPT | Mod: PBBFAC,,, | Performed by: INTERNAL MEDICINE

## 2019-02-05 PROCEDURE — 99999 PR PBB SHADOW E&M-EST. PATIENT-LVL III: ICD-10-PCS | Mod: PBBFAC,,, | Performed by: INTERNAL MEDICINE

## 2019-02-05 PROCEDURE — 1101F PT FALLS ASSESS-DOCD LE1/YR: CPT | Mod: CPTII,S$GLB,, | Performed by: INTERNAL MEDICINE

## 2019-02-05 PROCEDURE — 99214 OFFICE O/P EST MOD 30 MIN: CPT | Mod: S$GLB,,, | Performed by: INTERNAL MEDICINE

## 2019-02-05 PROCEDURE — 1101F PR PT FALLS ASSESS DOC 0-1 FALLS W/OUT INJ PAST YR: ICD-10-PCS | Mod: CPTII,S$GLB,, | Performed by: INTERNAL MEDICINE

## 2019-02-05 PROCEDURE — 93000 ELECTROCARDIOGRAM COMPLETE: CPT | Mod: S$GLB,,, | Performed by: INTERNAL MEDICINE

## 2019-02-05 PROCEDURE — 93000 EKG 12-LEAD: ICD-10-PCS | Mod: S$GLB,,, | Performed by: INTERNAL MEDICINE

## 2019-02-05 PROCEDURE — 99214 PR OFFICE/OUTPT VISIT, EST, LEVL IV, 30-39 MIN: ICD-10-PCS | Mod: S$GLB,,, | Performed by: INTERNAL MEDICINE

## 2019-02-05 NOTE — PROGRESS NOTES
CARDIOVASCULAR PROGRESS NOTE    REASON FOR CONSULT:   Patito Mcintyre is a 89 y.o. female who presents for follow up of htn, hx recurrent DVT, PAD.    PCP: Leena Whiting: Greg Tirado: Angela  HISTORY OF PRESENT ILLNESS:   The patient returns for follow-up, accompanied by her daughter.  Her main complaint today appears to be heaviness of her legs, despite the use of compression therapy.  She denies intercurrent angina or dyspnea.  There has been no palpitations, lightheadedness, dizziness, or syncope.  She denies PND, orthopnea, melena, hematuria, or claudicant symptoms.  She appears to be tolerating her anticoagulation with Xarelto as well as aspirin 81 mg daily.  She does describe a nose bleed earlier in the week and I have suggested she try to keep her nasal passage is moist as the recent dry weather may have contributed to this.  She also is asking about holding her anticoagulation for dental work, and I feel that this is acceptable if absolutely necessary as per her dentist.    In the interim since her last visit, the patient was seen by Hematology and vascular surgery.  Long-term anticoagulation is planned given the patient's recurrent DVTs.  She was also told to continue compression therapy and revisit with vascular surgery if symptoms persist.  I have asked the patient to make another appointment with Dr. Miller.    CARDIOVASCULAR HISTORY:   PAD: Probable high-grade stenosis at the origin of the SMA and left renal arteries. (CTA abd 10/27/15)  CVI  DVT LLE 8/2016, resolved on venous US 2/2017, recurrent 6/2018    PAST MEDICAL HISTORY:     Past Medical History:   Diagnosis Date    Atherosclerosis of abdominal aorta     noted on CT scan 10/27/2015    Back pain     BPPV (benign paroxysmal positional vertigo)     Cancer     skin cancer    Chronic kidney disease, stage III (moderate)     Dependent edema     Diabetes with neurologic complications     DVT (deep venous thrombosis)     Essential  tremor     Hearing loss     with hearing aides 1/2014    History of shingles 2017    Hyperlipidemia LDL goal <100     Hypertension     Left posterior capsular opacification 3/23/2017    Macular degeneration of left eye     Macular hole     od    Mild carotid artery disease     Mild hyperparathyroidism     Osteoarthritis     Osteoporosis, postmenopausal     Overweight(278.02)     Peripheral vascular disease     Polymyalgia rheumatica     followed by rheumatology, Dr. Mendez    Stenosis of abdominal aorta     noted on CT scan 10/27/2015; probable high-grade stenosis at the origin of the SMA and left renal arteries    Type II or unspecified type diabetes mellitus without mention of complication, not stated as uncontrolled     Urinary incontinence     Varicose veins        PAST SURGICAL HISTORY:     Past Surgical History:   Procedure Laterality Date    bilateral cataract surgery      bilateral leg vein dilation      CATARACT EXTRACTION      ou    left knee arthroscopy      left macular degeneration eye surgery      right knee surgery      SKIN CANCER EXCISION  2002, 2017    [facility]; face    TOTAL ABDOMINAL HYSTERECTOMY      YAG Laser Capsulotomy Left 03/23/2017    Dr. Faulkner       ALLERGIES AND MEDICATION:     Review of patient's allergies indicates:   Allergen Reactions    Inderal [propranolol]     Indomethacin sodium      Other reaction(s): Vomiting  Other reaction(s): Nausea     Previous Medications    ACETAMINOPHEN (TYLENOL ORAL)    Take 1 tablet by mouth as needed.    ASPIRIN (ECOTRIN) 81 MG EC TABLET    Take 1 tablet by mouth Daily. Pt. Take orange flavored chewable tablet.    ATORVASTATIN (LIPITOR) 40 MG TABLET    TAKE 1 TABLET BY MOUTH EVERY EVENING    CALCIUM CARBONATE/VITAMIN D3 (OS-WENDY 500 + D3 ORAL)    Take by mouth.    GLUCOSAMINE HCL/CHONDR LEARY A NA (OSTEO BI-FLEX ORAL)    Take 1 tablet by mouth once daily.    LOSARTAN (COZAAR) 50 MG TABLET    Take 1 tablet (50 mg total)  by mouth once daily.    OMEGA-3S/DHA/EPA/FISH OIL (OMEGA 3 ORAL)    Take 550 mg by mouth. 1 Capsule Oral Every day    OMEPRAZOLE (PRILOSEC) 20 MG CAPSULE    TAKE 1 CAPSULE BY MOUTH TWICE DAILY    RIVAROXABAN (XARELTO) 20 MG TAB    Take 1 tablet (20 mg total) by mouth once daily.    VITAMIN E 200 UNIT CAPSULE    Take 200 Units by mouth once daily.       SOCIAL HISTORY:     Social History     Socioeconomic History    Marital status:      Spouse name: Not on file    Number of children: Not on file    Years of education: Not on file    Highest education level: Not on file   Social Needs    Financial resource strain: Not on file    Food insecurity - worry: Not on file    Food insecurity - inability: Not on file    Transportation needs - medical: Not on file    Transportation needs - non-medical: Not on file   Occupational History    Not on file   Tobacco Use    Smoking status: Never Smoker    Smokeless tobacco: Never Used   Substance and Sexual Activity    Alcohol use: No    Drug use: No    Sexual activity: No   Other Topics Concern    Not on file   Social History Narrative    She lives by herself. She is originally from [place].       FAMILY HISTORY:     Family History   Problem Relation Age of Onset    Hypertension Unknown     Edema Unknown     Breast cancer Sister     No Known Problems Mother     No Known Problems Father     No Known Problems Brother     No Known Problems Maternal Aunt     No Known Problems Maternal Uncle     No Known Problems Paternal Aunt     No Known Problems Paternal Uncle     No Known Problems Maternal Grandmother     No Known Problems Maternal Grandfather     No Known Problems Paternal Grandmother     No Known Problems Paternal Grandfather     Amblyopia Neg Hx     Blindness Neg Hx     Cataracts Neg Hx     Diabetes Neg Hx     Glaucoma Neg Hx     Macular degeneration Neg Hx     Retinal detachment Neg Hx     Strabismus Neg Hx     Stroke Neg Hx      "Thyroid disease Neg Hx     Cancer Neg Hx        REVIEW OF SYSTEMS:   Review of Systems   Constitutional: Negative for chills, diaphoresis and fever.   HENT: Positive for nosebleeds.    Eyes: Negative for blurred vision, double vision and photophobia.   Respiratory: Negative for hemoptysis, shortness of breath and wheezing.    Cardiovascular: Negative for chest pain, palpitations, orthopnea, claudication, leg swelling and PND.   Gastrointestinal: Negative for abdominal pain, blood in stool, heartburn, melena, nausea and vomiting.   Genitourinary: Negative for flank pain and hematuria.   Musculoskeletal: Negative for falls, myalgias and neck pain.   Skin: Negative for rash.   Neurological: Positive for tremors. Negative for dizziness, seizures, loss of consciousness, weakness and headaches.   Endo/Heme/Allergies: Negative for polydipsia. Does not bruise/bleed easily.   Psychiatric/Behavioral: Negative for depression and memory loss. The patient is not nervous/anxious.        PHYSICAL EXAM:     Vitals:    02/05/19 0956   BP: 126/62   Pulse: 102   Resp: 15    Body mass index is 30.55 kg/m².  Weight: 80.7 kg (178 lb)   Height: 5' 4" (162.6 cm)     Physical Exam   Constitutional: She is oriented to person, place, and time. She appears well-developed and well-nourished. She is cooperative.  Non-toxic appearance. No distress.   HENT:   Head: Normocephalic and atraumatic.   Eyes: Conjunctivae and EOM are normal. Pupils are equal, round, and reactive to light. No scleral icterus.   Neck: Trachea normal and normal range of motion. Neck supple. Normal carotid pulses and no JVD present. Carotid bruit is not present. No neck rigidity. No tracheal deviation and no edema present. No thyromegaly present.   Cardiovascular: Regular rhythm, S1 normal and S2 normal. Tachycardia present. PMI is not displaced. Exam reveals no gallop and no friction rub.   No murmur heard.  Pulses:       Carotid pulses are 2+ on the right side, and 2+ on " the left side with bruit.  LLE venous varicosities   Pulmonary/Chest: Effort normal and breath sounds normal. No stridor. No respiratory distress. She has no wheezes. She has no rales. She exhibits no tenderness.   Abdominal: Soft. She exhibits no distension. There is no hepatosplenomegaly.   Musculoskeletal: She exhibits no edema or tenderness.   Feet:   Right Foot:   Skin Integrity: Negative for ulcer.   Left Foot:   Skin Integrity: Negative for ulcer.   Neurological: She is alert and oriented to person, place, and time. She displays tremor. No cranial nerve deficit.   tremor   Skin: Skin is warm and dry. No rash noted. No erythema.   Psychiatric: She has a normal mood and affect. Her speech is normal and behavior is normal.   Vitals reviewed.      DATA:   EKG: (personally reviewed tracing)  2/5/19 , LAD, low volt    Laboratory:  CBC:  Recent Labs   Lab 08/16/16 0447 07/17/17  0845 06/24/18  1915   WHITE BLOOD CELL COUNT 6.21 5.70 7.00   HEMOGLOBIN 11.9 L 15.8 14.6   HEMATOCRIT 34.4 L 48.1 43.8   PLATELETS 245 211 149 L       CHEMISTRIES:  Recent Labs   Lab 08/16/16  0447  08/18/16  0437  08/19/16  0435 07/17/17  0845 06/24/18  1915   GLUCOSE 102   < > 95   < > 103 115 H 88   SODIUM 116 LL   < > 137   < > 137 140 139   POTASSIUM 3.8   < > 4.1   < > 4.2 4.2 4.7   BUN BLD 9 L   < > 12   < > 12 18 19   CREATININE 0.8   < > 0.8   < > 0.8 0.9 1.0   EGFR IF  >60   < > >60   < > >60 >60.0 56 A   EGFR IF NON- >60   < > >60   < > >60 55.7 A 49 A   CALCIUM 8.1 L   < > 8.9   < > 9.1 10.1 9.7   MAGNESIUM 1.6  --  2.1  --  2.2  --   --     < > = values in this interval not displayed.       CARDIAC BIOMARKERS:  Recent Labs   Lab 08/15/16  2236   TROPONIN I <0.006       COAGS:  Recent Labs   Lab 08/05/16  1718 08/06/16  0441   INR 1.0 1.0       LIPIDS/LFTS:  Recent Labs   Lab 03/28/16  0933  08/16/16  0447 07/17/17  0845 06/24/18  1915 11/01/18  0752   CHOLESTEROL 111 L  --   --  124  --   132   TRIGLYCERIDES 90  --   --  118  --  89   HDL 38 L  --   --  41  --  43   LDL CHOLESTEROL 55.0 L  --   --  59.4 L  --  71.2   NON-HDL CHOLESTEROL 73  --   --  83  --  89   AST 30   < > 25 37 40  --    ALT 17   < > 19 21 18  --     < > = values in this interval not displayed.       Cardiovascular Testing:  Carotid US 9/6/18 (similar findings on report 1/8/16)  There is 40 - 49% right Internal Carotid stenosis.  There is 20 - 39% left Internal Carotid stenosis.    LLE venous US 6/24/18 (recurrent LLE DVT vs 2/2017 report)  Extensive left lower extremity acute deep venous thrombosis.    Echo 7/11/16:    1 - Normal left ventricular systolic function (EF 55-60%).  Basal inferior hypokinesis.     2 - Concentric remodeling.     3 - Left ventricular diastolic dysfunction.     4 - Trivial to mild mitral regurgitation.     5 - Mild tricuspid regurgitation.     6 - Pulmonary hypertension. The estimated PA systolic pressure is 43 mmHg.     CTA abdomen 10/27/15:  Distended contrast-filled bladder. No vesico-enteric fistula identified. Trace amount of contrast seen in the vaginal canal.  Extensive calcified atherosclerotic disease with probable high-grade stenosis at the origin of the SMA and left renal arteries.  Significantly distended gas and fluid-filled stomach.    ASSESSMENT:   # DVT LLE 8/2016, resolved by US 2/2017, recurrent 6/2018.  Hypercoag w/u neg (see labs 8/2/18).  Seen by Dr. Miller with rec for compression rx.  # Peripheral vascular disease as evidence by the vascular stenoses of her SMA and L renal arteries. Fortunately, these do not seem to be causing and significant problems/symptoms at this juncture.  # Carotid bruits, pt denies prior h/o CVA/TIA. Carotid US 9/2018 neg for significant stenosis  # HTN, controlled  # HLP, on atorva 40mg  # Venous insufficiency s/p venous stripping, cont sxs despite use of compression rx    PLAN:   Cont med rx  Cont Xarelto to 20mg qd, indef rx planned  Cont ASA 81mg qd  for hx of PAD, can stop in the future if bleeding becomes an issue.  I have asked pt to revisit with Dr. Miller for further eval/mgmt of her CVI sxs  RTC 1 year    Scotty Landin MD, FACC

## 2019-03-18 ENCOUNTER — OFFICE VISIT (OUTPATIENT)
Dept: VASCULAR SURGERY | Facility: CLINIC | Age: 84
End: 2019-03-18
Payer: MEDICARE

## 2019-03-18 VITALS — HEART RATE: 109 BPM | BODY MASS INDEX: 30.71 KG/M2 | HEIGHT: 64 IN | WEIGHT: 179.88 LBS

## 2019-03-18 DIAGNOSIS — I83.813 VARICOSE VEINS OF BILATERAL LOWER EXTREMITIES WITH PAIN: Primary | ICD-10-CM

## 2019-03-18 DIAGNOSIS — I87.303 VENOUS HYPERTENSION OF BOTH LOWER EXTREMITIES: ICD-10-CM

## 2019-03-18 DIAGNOSIS — M25.562 LEFT KNEE PAIN, UNSPECIFIED CHRONICITY: Primary | ICD-10-CM

## 2019-03-18 PROCEDURE — 99999 PR PBB SHADOW E&M-EST. PATIENT-LVL III: CPT | Mod: PBBFAC,,, | Performed by: SURGERY

## 2019-03-18 PROCEDURE — 99214 OFFICE O/P EST MOD 30 MIN: CPT | Mod: S$GLB,,, | Performed by: SURGERY

## 2019-03-18 PROCEDURE — 1101F PR PT FALLS ASSESS DOC 0-1 FALLS W/OUT INJ PAST YR: ICD-10-PCS | Mod: CPTII,S$GLB,, | Performed by: SURGERY

## 2019-03-18 PROCEDURE — 1101F PT FALLS ASSESS-DOCD LE1/YR: CPT | Mod: CPTII,S$GLB,, | Performed by: SURGERY

## 2019-03-18 PROCEDURE — 99999 PR PBB SHADOW E&M-EST. PATIENT-LVL III: ICD-10-PCS | Mod: PBBFAC,,, | Performed by: SURGERY

## 2019-03-18 PROCEDURE — 99214 PR OFFICE/OUTPT VISIT, EST, LEVL IV, 30-39 MIN: ICD-10-PCS | Mod: S$GLB,,, | Performed by: SURGERY

## 2019-03-18 NOTE — LETTER
March 18, 2019      Margaret Stern MD  [address] [facility]  [place] [address]           [facility] [place] [place] [facility]  [address] [facility] [address]  [place] [address]  Phone: [phone]  Fax: [phone]          Patient: Patito Mcintyre   MR Number: [id number]   YOB: 1925   Date of Visit: 3/18/2019       Dear Dr. Margaret Stern:    Thank you for referring Patito Mcintyre to me for evaluation. Attached you will find relevant portions of my assessment and plan of care.    If you have questions, please do not hesitate to call me. I look forward to following Patito Mcintyre along with you.    Sincerely,    Demond Miller MD    Enclosure  CC:  No Recipients    If you would like to receive this communication electronically, please contact [email or web address] or [phone] to request more information on [employer]progress Link access.    For providers and/or their staff who would like to refer a patient to [facility], please contact us through our one-stop-shop provider referral line, [facility], at [phone].    If you feel you have received this communication in error or would no longer like to receive these types of communications, please e-mail [email or web address]

## 2019-03-18 NOTE — PATIENT INSTRUCTIONS
Leg Swelling in Both Legs    Swelling of the feet, ankles, and legs is called edema. It is caused by excess fluid that has collected in the tissues. Extra fluid in the body settles in the lowest part because of gravity. This is why the legs and feet are most affected.  Some of the causes for edema include:  · Disease of the heart like congestive heart failure  · Standing or sitting for long periods of time  · Infection of the feet or legs  · Blood pooling in the veins of your legs (venous insufficiency)  · Dilated veins in your lower leg (varicose veins)  · Garters or other clothing that is tight on your legs. This will cause blood to pool in your legs because the clothing limits blood flow.  · Some medicines such as hormones like birth control pills, some blood pressure medicines like calcium channel blockers (amlodipine) and steroids, some antidepressants like MAO inhibitors and tricyclics  · Menstrual periods that cause you to retain fluids  · Many types of renal disease  · Liver failure or cirrhosis  · Pregnancy, some swelling is normal, but a sudden increase in leg swelling or weight gain can be a sign of a dangerous complication of pregnancy  · Poor nutrition  · Thyroid disease  Medical treatment will depend on what is causing the swelling in your legs. Your healthcare provider may prescribe water pills (diuretics) to get rid of the extra fluid.  Home care  Follow these guidelines when caring for yourself at home:  · Don't wear clothing like garters that is tight on your legs.  · Keep your legs up while lying or sitting.  · If infection, injury, or recent surgery is causing the swelling, stay off your legs as much as possible until symptoms get better.  · If your healthcare provider says that your leg swelling is caused by venous insufficiency or varicose veins, don't sit or  one place for long periods of time. Take breaks and walk about every few hours. Brisk walking is a good exercise. It helps  circulate the blood that has collected in your leg. Talk with your provider about using support stockings to stop daytime leg swelling.  · If your provider says that heart disease is causing your leg swelling, follow a low-salt diet to stop extra fluid from staying in your body. You may also need medicine.  Follow-up care  Follow up with your healthcare provider, or as advised.  When to seek medical advice  Call your healthcare provider right away if any of these occur:  · New shortness of breath or chest pain  · Shortness of breath or chest pain that gets worse  · Swelling in both legs or ankles that gets worse  · Swelling of the abdomen  · Redness, warmth, or swelling in one leg  · Fever of 100.4ºF (38ºC) or higher, or as directed by your healthcare provider  · Yellow color to your skin or eyes  · Rapid, unexplained weight gain  · Having to sleep upright or use an increased number of pillows  Date Last Reviewed: 3/31/2016  © 5745-3744 [employer]. [address] [place] [address], [place] [address]. All rights reserved. This information is not intended as a substitute for professional medical care. Always follow your healthcare professional's instructions.

## 2019-03-18 NOTE — PROGRESS NOTES
Demond Miller MD [facility]                         03/18/2019    HPI:  Patito Mcintyre is a 89 y.o. female with   Patient Active Problem List   Diagnosis    Osteoporosis, postmenopausal    Essential tremor    Macular degeneration of left eye    Osteoarthritis    Varicose veins    History of polymyalgia rheumatica    Overweight (BMI 25.0-29.9)    Type 2 diabetes, controlled, with neuropathy    Post herpetic neuralgia    Chronic low back pain    Benign hypertension with chronic kidney disease, stage III    Hyperlipidemia    GERD (gastroesophageal reflux disease)    Postmenopausal atrophic vaginitis    Atherosclerosis of abdominal aorta    Stenosis of abdominal aorta    Mild carotid artery disease    Peripheral vascular disease    History of DVT (deep vein thrombosis)    Recurrent UTI    OAB (overactive bladder)    Left posterior capsular opacification    Secondary hyperparathyroidism of renal origin    Chronic venous insufficiency    being managed by PCP and specialists who is here today for evaluation of LLE DVT, KAREEM and SMA/L renal artery atherosclerosis.  Pt has had 3 DVT of LLE.  S/p BLE saphenous stripping 50 yrs ago.  Patient states location is LLE occurring for several years.  Associated signs and symptoms include edema, varicose veins.  Symptoms began after first born child.  Alleviating factors include elevation.  Worsening factors include dependency.  No wounds to BLE.  No TIA or CVA. Functional status unlimited.  No symptoms of chronic mesenteric ischemia.    no MI  no Stroke  Tobacco use: no    Interval history: c/o L knee pain, intermittent at night.  Denies BLE edema.  No heaviness in BLE.  +compression although stockings 10 yrs old.  No limitation to functional activity, enjoying hobbies without issues.  Not compliant with low Na diet.  No excessive water intake.  +elevation.      Past Medical History:   Diagnosis Date     Atherosclerosis of abdominal aorta     noted on CT scan 10/27/2015    Back pain     BPPV (benign paroxysmal positional vertigo)     Cancer     skin cancer    Chronic kidney disease, stage III (moderate)     Dependent edema     Diabetes with neurologic complications     DVT (deep venous thrombosis)     Essential tremor     Hearing loss     with hearing aides 1/2014    History of shingles 2017    Hyperlipidemia LDL goal <100     Hypertension     Left posterior capsular opacification 3/23/2017    Macular degeneration of left eye     Macular hole     od    Mild carotid artery disease     Mild hyperparathyroidism     Osteoarthritis     Osteoporosis, postmenopausal     Overweight(278.02)     Peripheral vascular disease     Polymyalgia rheumatica     followed by rheumatology, Dr. Mendez    Stenosis of abdominal aorta     noted on CT scan 10/27/2015; probable high-grade stenosis at the origin of the SMA and left renal arteries    Type II or unspecified type diabetes mellitus without mention of complication, not stated as uncontrolled     Urinary incontinence     Varicose veins      Past Surgical History:   Procedure Laterality Date    bilateral cataract surgery      bilateral leg vein dilation      CATARACT EXTRACTION      ou    left knee arthroscopy      left macular degeneration eye surgery      right knee surgery      SKIN CANCER EXCISION  2002, 2017    Basil Cell; face    TOTAL ABDOMINAL HYSTERECTOMY      YAG Laser Capsulotomy Left 03/23/2017    Dr. Faulkner     Family History   Problem Relation Age of Onset    Hypertension Unknown     Edema Unknown     Breast cancer Sister     No Known Problems Mother     No Known Problems Father     No Known Problems Brother     No Known Problems Maternal Aunt     No Known Problems Maternal Uncle     No Known Problems Paternal Aunt     No Known Problems Paternal Uncle     No Known Problems Maternal Grandmother     No Known Problems  Maternal Grandfather     No Known Problems Paternal Grandmother     No Known Problems Paternal Grandfather     Amblyopia Neg Hx     Blindness Neg Hx     Cataracts Neg Hx     Diabetes Neg Hx     Glaucoma Neg Hx     Macular degeneration Neg Hx     Retinal detachment Neg Hx     Strabismus Neg Hx     Stroke Neg Hx     Thyroid disease Neg Hx     Cancer Neg Hx      Social History     Socioeconomic History    Marital status:      Spouse name: Not on file    Number of children: Not on file    Years of education: Not on file    Highest education level: Not on file   Social Needs    Financial resource strain: Not on file    Food insecurity - worry: Not on file    Food insecurity - inability: Not on file    Transportation needs - medical: Not on file    Transportation needs - non-medical: Not on file   Occupational History    Not on file   Tobacco Use    Smoking status: Never Smoker    Smokeless tobacco: Never Used   Substance and Sexual Activity    Alcohol use: No    Drug use: No    Sexual activity: No   Other Topics Concern    Not on file   Social History Narrative    She lives by herself. She is originally from [place].       Current Outpatient Medications:     ACETAMINOPHEN (TYLENOL ORAL), Take 1 tablet by mouth as needed., Disp: , Rfl:     aspirin (ECOTRIN) 81 MG EC tablet, Take 1 tablet by mouth Daily. Pt. Take orange flavored chewable tablet., Disp: , Rfl:     atorvastatin (LIPITOR) 40 MG tablet, TAKE 1 TABLET BY MOUTH EVERY EVENING, Disp: 90 tablet, Rfl: 3    CALCIUM CARBONATE/VITAMIN D3 (OS-WENDY 500 + D3 ORAL), Take by mouth., Disp: , Rfl:     GLUCOSAMINE HCL/CHONDR LEARY A NA (OSTEO BI-FLEX ORAL), Take 1 tablet by mouth once daily., Disp: , Rfl:     losartan (COZAAR) 50 MG tablet, Take 1 tablet (50 mg total) by mouth once daily., Disp: 90 tablet, Rfl: 3    OMEGA-3S/DHA/EPA/FISH OIL (OMEGA 3 ORAL), Take 550 mg by mouth. 1 Capsule Oral Every day, Disp: , Rfl:     omeprazole  (PRILOSEC) 20 MG capsule, TAKE 1 CAPSULE BY MOUTH TWICE DAILY, Disp: 180 capsule, Rfl: 1    rivaroxaban (XARELTO) 20 mg Tab, Take 1 tablet (20 mg total) by mouth once daily., Disp: 90 tablet, Rfl: 3    vitamin E 200 UNIT capsule, Take 200 Units by mouth once daily., Disp: , Rfl:     REVIEW OF SYSTEMS:  General: No fevers or chills; ENT: No sore throat; Allergy and Immunology: no persistent infections; Hematological and Lymphatic: No history of bleeding or easy bruising; Endocrine: negative; Respiratory: no cough, shortness of breath, or wheezing; Cardiovascular: no chest pain or dyspnea on exertion; Gastrointestinal: no abdominal pain/back, change in bowel habits, or bloody stools; Genito-Urinary: no dysuria, trouble voiding, or hematuria; Musculoskeletal: negative; Neurological: no TIA or stroke symptoms; Psychiatric: no nervousness, anxiety or depression.    PHYSICAL EXAM:      Pulse: 109         General appearance:  Alert, well-appearing, and in no distress.  Oriented to person, place, and time                    Neurological: Normal speech, no focal findings noted; CN II - XII grossly intact. RLE with sensation to light touch, LLE with sensation to light touch.            Musculoskeletal: Digits/nail without cyanosis/clubbing.  Strength 5/5 BLE.                    Neck: Supple, no significant adenopathy                  Chest:  Clear to auscultation, no wheezes, rales or rhonchi, symmetric air entry. No use of accessory muscles               Cardiac: Normal rate and regular rhythm, S1 and S2 normal            Abdomen: Soft, nontender, nondistended, no masses or organomegaly, no hernia     No rebound tenderness noted; bowel sounds normal     No groin adenopathy      Extremities:   2+ R femoral pulse, 2+ L femoral pulse     1+ R popliteal pulse, 1+ L popliteal pulse     1+ R PT pulse, 1+ L PT pulse     1+ R DP pulse, 1+ L DP pulse     2+ RLE edema, 2+ LLE edema    Skin: RLE without ulcer; LLE without  ulcer    CEAP 3/3 with large varicose veins bilaterally    LAB RESULTS:  No results found for: CBC  Lab Results   Component Value Date    LABPROT 10.8 08/06/2016    INR 1.0 08/06/2016     Lab Results   Component Value Date     06/24/2018    K 4.7 06/24/2018     06/24/2018    CO2 20 (L) 06/24/2018    GLU 88 06/24/2018    BUN 19 06/24/2018    CREATININE 1.0 06/24/2018    CALCIUM 9.7 06/24/2018    ANIONGAP 13 06/24/2018    EGFRNONAA 49 (A) 06/24/2018     Lab Results   Component Value Date    WBC 7.00 06/24/2018    RBC 4.59 06/24/2018    HGB 14.6 06/24/2018    HCT 43.8 06/24/2018    MCV 95 06/24/2018    MCH 31.8 (H) 06/24/2018    MCHC 33.3 06/24/2018    RDW 13.4 06/24/2018     (L) 06/24/2018    MPV 10.0 06/24/2018    GRAN 3.9 06/24/2018    GRAN 55.2 06/24/2018    LYMPH 1.7 06/24/2018    LYMPH 24.9 06/24/2018    MONO 1.0 06/24/2018    MONO 14.0 06/24/2018    EOS 0.4 06/24/2018    BASO 0.02 06/24/2018    EOSINOPHIL 5.3 06/24/2018    BASOPHIL 0.3 06/24/2018    DIFFMETHOD Automated 06/24/2018     .  Lab Results   Component Value Date    HGBA1C 6.0 (H) 11/01/2018       IMAGING:  All pertinent imaging has been reviewed and interpreted independently.    LLE DVT femoral vein to PT vein    Carotid US <50% stenosis bilaterally    CT A/P non contrast with calcifications of SMA and L renal origin    IMP/PLAN:  89 y.o. female with   Patient Active Problem List   Diagnosis    Osteoporosis, postmenopausal    Essential tremor    Macular degeneration of left eye    Osteoarthritis    Varicose veins    History of polymyalgia rheumatica    Overweight (BMI 25.0-29.9)    Type 2 diabetes, controlled, with neuropathy    Post herpetic neuralgia    Chronic low back pain    Benign hypertension with chronic kidney disease, stage III    Hyperlipidemia    GERD (gastroesophageal reflux disease)    Postmenopausal atrophic vaginitis    Atherosclerosis of abdominal aorta    Stenosis of abdominal aorta    Mild carotid  artery disease    Peripheral vascular disease    History of DVT (deep vein thrombosis)    Recurrent UTI    OAB (overactive bladder)    Left posterior capsular opacification    Secondary hyperparathyroidism of renal origin    Chronic venous insufficiency    being managed by PCP and specialists who is here today for evaluation of LLE DVT and CT findings with concern for SMA and L renal artery stenosis.    -LLE DVT unprovoked with h/o 3 DVT and treatment completed - agree with Hematology recs and anticoagulation; no iliac involvement or findings of phlegmasia on exam.  If symptoms worsen or leg becomes compromised, will repeat venous US   -Recommend compression with Rx stockings (new Rx provided today), elevation, dietary changes associated with water and sodium intake discussed at length with patient  -Will obtain BLE venous reflux US - if edema or varicose veins become symptomatic, will discuss surgical intervention  -Carotid stenosis being followed by Dr. Landin  -CT incidental findings of atherosclerotic disease of SMA and L renal artery origin without symptoms of CMI or LYN - no vascular surgical intervention warranted at this time.  No further workup indicated unless pt develops symptoms.  -L knee pain - Ortho referral  -RTC 3 mo for further evaluation    I spent 20 minutes evaluating this patient and greater than 50% of the time was spent counseling, coordinator care and discussing the plan of care.  All questions were answered and patient stated understanding with agreement with the above treatment plan.    Demond Miller MD [facility]  Vascular and Endovascular Surgery

## 2019-03-20 DIAGNOSIS — M25.569 KNEE PAIN, UNSPECIFIED CHRONICITY, UNSPECIFIED LATERALITY: Primary | ICD-10-CM

## 2019-04-09 ENCOUNTER — APPOINTMENT (OUTPATIENT)
Dept: RADIOLOGY | Facility: HOSPITAL | Age: 84
End: 2019-04-09
Attending: ORTHOPAEDIC SURGERY
Payer: MEDICARE

## 2019-04-09 ENCOUNTER — OFFICE VISIT (OUTPATIENT)
Dept: ORTHOPEDICS | Facility: CLINIC | Age: 84
End: 2019-04-09
Payer: MEDICARE

## 2019-04-09 VITALS
DIASTOLIC BLOOD PRESSURE: 60 MMHG | WEIGHT: 179 LBS | HEART RATE: 63 BPM | BODY MASS INDEX: 30.56 KG/M2 | SYSTOLIC BLOOD PRESSURE: 110 MMHG | HEIGHT: 64 IN

## 2019-04-09 DIAGNOSIS — M25.569 KNEE PAIN, UNSPECIFIED CHRONICITY, UNSPECIFIED LATERALITY: ICD-10-CM

## 2019-04-09 DIAGNOSIS — M17.12 PRIMARY OSTEOARTHRITIS OF LEFT KNEE: Primary | ICD-10-CM

## 2019-04-09 PROCEDURE — 99203 OFFICE O/P NEW LOW 30 MIN: CPT | Mod: 25,S$GLB,, | Performed by: ORTHOPAEDIC SURGERY

## 2019-04-09 PROCEDURE — 20610 DRAIN/INJ JOINT/BURSA W/O US: CPT | Mod: LT,S$GLB,, | Performed by: ORTHOPAEDIC SURGERY

## 2019-04-09 PROCEDURE — 73562 X-RAY EXAM OF KNEE 3: CPT | Mod: 26,LT,, | Performed by: RADIOLOGY

## 2019-04-09 PROCEDURE — 99203 PR OFFICE/OUTPT VISIT, NEW, LEVL III, 30-44 MIN: ICD-10-PCS | Mod: 25,S$GLB,, | Performed by: ORTHOPAEDIC SURGERY

## 2019-04-09 PROCEDURE — 1101F PR PT FALLS ASSESS DOC 0-1 FALLS W/OUT INJ PAST YR: ICD-10-PCS | Mod: CPTII,S$GLB,, | Performed by: ORTHOPAEDIC SURGERY

## 2019-04-09 PROCEDURE — 73562 XR KNEE 3 VIEW LEFT: ICD-10-PCS | Mod: 26,LT,, | Performed by: RADIOLOGY

## 2019-04-09 PROCEDURE — 73562 X-RAY EXAM OF KNEE 3: CPT | Mod: TC,FY,PN,LT

## 2019-04-09 PROCEDURE — 99999 PR PBB SHADOW E&M-EST. PATIENT-LVL III: CPT | Mod: PBBFAC,,, | Performed by: ORTHOPAEDIC SURGERY

## 2019-04-09 PROCEDURE — 20610 LARGE JOINT ASPIRATION/INJECTION: L KNEE: ICD-10-PCS | Mod: LT,S$GLB,, | Performed by: ORTHOPAEDIC SURGERY

## 2019-04-09 PROCEDURE — 99999 PR PBB SHADOW E&M-EST. PATIENT-LVL III: ICD-10-PCS | Mod: PBBFAC,,, | Performed by: ORTHOPAEDIC SURGERY

## 2019-04-09 PROCEDURE — 1101F PT FALLS ASSESS-DOCD LE1/YR: CPT | Mod: CPTII,S$GLB,, | Performed by: ORTHOPAEDIC SURGERY

## 2019-04-09 RX ORDER — TRIAMCINOLONE ACETONIDE 40 MG/ML
40 INJECTION, SUSPENSION INTRA-ARTICULAR; INTRAMUSCULAR
Status: DISCONTINUED | OUTPATIENT
Start: 2019-04-09 | End: 2019-04-09 | Stop reason: HOSPADM

## 2019-04-09 RX ADMIN — TRIAMCINOLONE ACETONIDE 40 MG: 40 INJECTION, SUSPENSION INTRA-ARTICULAR; INTRAMUSCULAR at 01:04

## 2019-04-09 NOTE — PROGRESS NOTES
Chief Complaint   Patient presents with    Left Knee - Pain, Swelling       This patient was seen in consultation at the request of Dr. Demond Miller     HPI: Patito Mcintyre is a 89 y.o. female who presents today complaining of Pain and Swelling of the Left Knee     Duration of symptoms:  Several years  Trauma or new activity:  No  Pain is intermittent  Aggravating factors:  Walking  Relieving factors:  No known  Radicular symptoms: no numbness and paresthesias   Associated symptoms:  swelling and giving way.  No popping  She has had multiple DVTs and has swelling in the legs associated with this.  Because of the DVTs she spends a lot of time elevating her legs  Prior treatment:   OTC Tylenol.  Cannot take anti-inflammatories because she is on Xarelto for multiple DVTs.  She has not had injections or physical therapy for this knee.  She does have a history of a right knee replacement, prior to this replacement she did have injections to the right knee    This is the extent of the patient's complaints at this time.       Review of Systems   All other systems reviewed and are negative.        Review of patient's allergies indicates:   Allergen Reactions    Inderal [propranolol]     Indomethacin sodium      Other reaction(s): Vomiting  Other reaction(s): Nausea         Current Outpatient Medications:     ACETAMINOPHEN (TYLENOL ORAL), Take 1 tablet by mouth as needed., Disp: , Rfl:     aspirin (ECOTRIN) 81 MG EC tablet, Take 1 tablet by mouth Daily. Pt. Take orange flavored chewable tablet., Disp: , Rfl:     atorvastatin (LIPITOR) 40 MG tablet, TAKE 1 TABLET BY MOUTH EVERY EVENING, Disp: 90 tablet, Rfl: 3    CALCIUM CARBONATE/VITAMIN D3 (OS-WENDY 500 + D3 ORAL), Take by mouth., Disp: , Rfl:     GLUCOSAMINE HCL/CHONDR LEARY A NA (OSTEO BI-FLEX ORAL), Take 1 tablet by mouth once daily., Disp: , Rfl:     losartan (COZAAR) 50 MG tablet, Take 1 tablet (50 mg total) by mouth once daily., Disp: 90 tablet, Rfl: 3     OMEGA-3S/DHA/EPA/FISH OIL (OMEGA 3 ORAL), Take 550 mg by mouth. 1 Capsule Oral Every day, Disp: , Rfl:     omeprazole (PRILOSEC) 20 MG capsule, TAKE 1 CAPSULE BY MOUTH TWICE DAILY, Disp: 180 capsule, Rfl: 1    rivaroxaban (XARELTO) 20 mg Tab, Take 1 tablet (20 mg total) by mouth once daily., Disp: 90 tablet, Rfl: 3    vitamin E 200 UNIT capsule, Take 200 Units by mouth once daily., Disp: , Rfl:     Past Medical History:   Diagnosis Date    Atherosclerosis of abdominal aorta     noted on CT scan 10/27/2015    Back pain     BPPV (benign paroxysmal positional vertigo)     Cancer     skin cancer    Chronic kidney disease, stage III (moderate)     Dependent edema     Diabetes with neurologic complications     DVT (deep venous thrombosis)     Essential tremor     Hearing loss     with hearing aides 1/2014    History of shingles 2017    Hyperlipidemia LDL goal <100     Hypertension     Left posterior capsular opacification 3/23/2017    Macular degeneration of left eye     Macular hole     od    Mild carotid artery disease     Mild hyperparathyroidism     Osteoarthritis     Osteoporosis, postmenopausal     Overweight(278.02)     Peripheral vascular disease     Polymyalgia rheumatica     followed by rheumatology, Dr. Mendez    Stenosis of abdominal aorta     noted on CT scan 10/27/2015; probable high-grade stenosis at the origin of the SMA and left renal arteries    Type II or unspecified type diabetes mellitus without mention of complication, not stated as uncontrolled     Urinary incontinence     Varicose veins        Patient Active Problem List   Diagnosis    Osteoporosis, postmenopausal    Essential tremor    Macular degeneration of left eye    Osteoarthritis    Varicose veins    History of polymyalgia rheumatica    Overweight (BMI 25.0-29.9)    Type 2 diabetes, controlled, with neuropathy    Post herpetic neuralgia    Chronic low back pain    Benign hypertension with chronic  kidney disease, stage III    Hyperlipidemia    GERD (gastroesophageal reflux disease)    Postmenopausal atrophic vaginitis    Atherosclerosis of abdominal aorta    Stenosis of abdominal aorta    Mild carotid artery disease    Peripheral vascular disease    History of DVT (deep vein thrombosis)    Recurrent UTI    OAB (overactive bladder)    Left posterior capsular opacification    Secondary hyperparathyroidism of renal origin    Chronic venous insufficiency       Past Surgical History:   Procedure Laterality Date    bilateral cataract surgery      bilateral leg vein dilation      CATARACT EXTRACTION      ou    left knee arthroscopy      left macular degeneration eye surgery      right knee surgery      SKIN CANCER EXCISION  2002, 2017    Basil Cell; face    TOTAL ABDOMINAL HYSTERECTOMY      YAG Laser Capsulotomy Left 03/23/2017    Dr. Faulkner       Social History     Tobacco Use    Smoking status: Never Smoker    Smokeless tobacco: Never Used   Substance Use Topics    Alcohol use: No    Drug use: No       Family History   Problem Relation Age of Onset    Hypertension Unknown     Edema Unknown     Breast cancer Sister     No Known Problems Mother     No Known Problems Father     No Known Problems Brother     No Known Problems Maternal Aunt     No Known Problems Maternal Uncle     No Known Problems Paternal Aunt     No Known Problems Paternal Uncle     No Known Problems Maternal Grandmother     No Known Problems Maternal Grandfather     No Known Problems Paternal Grandmother     No Known Problems Paternal Grandfather     Amblyopia Neg Hx     Blindness Neg Hx     Cataracts Neg Hx     Diabetes Neg Hx     Glaucoma Neg Hx     Macular degeneration Neg Hx     Retinal detachment Neg Hx     Strabismus Neg Hx     Stroke Neg Hx     Thyroid disease Neg Hx     Cancer Neg Hx        Physical Exam:     Vitals:    04/09/19 1141   BP: 110/60   Pulse: 63           General: Weight:  81.2 kg (179 lb) Body mass index is 30.73 kg/m².  Patient is alert, awake and oriented to time, place and person. Mood and affect are appropriate.  Patient does not appear to be in any distress, denies any constitutional symptoms and appears stated age.   HEENT: Pupils are equal and round, sclera are not injected. External examination of ears and nose reveals no abnormalities. Cranial nerves II-X are grossly intact  Skin: no rashes, abrasions or open wounds on the affected extremity   Resp: No respiratory distress or audible wheezing   CV: 2+ pulses, all extremities warm and well perfused    Left knee  Localizes pain over medial and lateral joint lines  No soft tissue swelling or erythema   Moderate effusion  Active ROM: 30 - 90  Passive ROM: 20 - 100 with pain  Non correctable varus deformity   Tender to palpation over patella, medial joint line  and lateral joint line    Poor quadricep muscle tone and bulk; no atrophy compared to contralateral extremity   The knee is ligamentously stable  Normal patellar tracking   No pain with patellar compression   ltsi s/s/sp/dp/t   + ehl/fhl/ta/gs  2 + DP     Imaging:  3 views left knee:  severe tricompartmental OA with subchondral sclerosis, joint space narrowing, osteophyte formation and loose bodies.      Assessment: 89 y.o. female with left knee osteoarthritis    I explained my diagnostic impression and the reasoning behind it in detail, using layman's terms.  Models and/or pictures were used to help in the explanation.      Plan:   - injection a left knee please see procedure note for more details  - Return to clinic as needed if symptoms worsen or fail to improve.    All questions were answered in detail. The patient is in full agreement with the treatment plan and will proceed accordingly.    A note notifying Dr. Demond Miller of my findings was sent via the electronic medical record     This note was created by combination of typed  and M-Modal  dictation. Transcription and phonetic errors may be present.  If there are any questions, please contact me.

## 2019-04-09 NOTE — LETTER
April 9, 2019      Demond Miller MD  [address] [facility] [address]  [address]  [place] [address]           [facility]  [address] [facility] [address]  [place] [address]  Phone: [phone]          Patient: Patito Mcintyre   MR Number: [id number]   YOB: 1925   Date of Visit: 4/9/2019       Dear Dr. Demond Miller:    Thank you for referring Patito Mcintyre to me for evaluation. Attached you will find relevant portions of my assessment and plan of care.    If you have questions, please do not hesitate to call me. I look forward to following Patito Mcintyre along with you.    Sincerely,    Jayshree Brown MD    Enclosure  CC:  No Recipients    If you would like to receive this communication electronically, please contact [email or web address] or [phone] to request more information on [facility] Link access.    For providers and/or their staff who would like to refer a patient to [facility], please contact us through our one-stop-shop provider referral line, [facility], at [phone].    If you feel you have received this communication in error or would no longer like to receive these types of communications, please e-mail [email or web address]

## 2019-04-09 NOTE — PROCEDURES
Large Joint Aspiration/Injection: L knee  Date/Time: 4/9/2019 1:12 PM  Performed by: Jayshree Brown MD  Authorized by: Jayshree Brown MD     Consent Done?:  Yes (Written)  Indications:  Pain  Timeout: Prior to procedure the correct patient, procedure, and site was verified      Location:  Knee  Site:  L knee  Prep: Patient was prepped and draped in usual sterile fashion    Ultrasonic Guidance for needle placement: No  Needle size:  22 G  Medications:  40 mg triamcinolone acetonide 40 mg/mL  Patient tolerance:  Patient tolerated the procedure well with no immediate complications

## 2019-04-11 RX ORDER — OMEPRAZOLE 20 MG/1
CAPSULE, DELAYED RELEASE ORAL
Qty: 180 CAPSULE | Refills: 0 | Status: SHIPPED | OUTPATIENT
Start: 2019-04-11 | End: 2019-10-10 | Stop reason: SDUPTHER

## 2019-04-11 RX ORDER — ATORVASTATIN CALCIUM 40 MG/1
TABLET, FILM COATED ORAL
Qty: 90 TABLET | Refills: 3 | Status: SHIPPED | OUTPATIENT
Start: 2019-04-11 | End: 2020-03-27

## 2019-06-17 ENCOUNTER — TELEPHONE (OUTPATIENT)
Dept: VASCULAR SURGERY | Facility: CLINIC | Age: 84
End: 2019-06-17

## 2019-06-17 NOTE — TELEPHONE ENCOUNTER
----- Message from Radha Hoang sent at 6/17/2019  9:51 AM CDT -----  Contact: Patient  Type: Patient Call Back    Who called: Patient    What is the request in detail: Pt is requesting a call back to discuss a new company for her compression socking.    Can the clinic reply by [facility]? No    Would the patient rather a call back or a response via [facility]? Call back    Best call back number: [phone]    Additional Information:      1000 Call to patient who stated she needed the number that the doctor gave her. Explained unsure of what number she was talking about. Went over the numbers for the facilities where she can get the stockings and she stated no. Explained the only other number would be the amount of compression which was 20-30 mm which was on the prescription that Dr. Mliler gave her. She stated it was not on the paper. Explained not exactly sure what she was looking for. She stated that was all for now.

## 2019-06-20 ENCOUNTER — OFFICE VISIT (OUTPATIENT)
Dept: VASCULAR SURGERY | Facility: CLINIC | Age: 84
End: 2019-06-20
Payer: MEDICARE

## 2019-06-20 ENCOUNTER — HOSPITAL ENCOUNTER (OUTPATIENT)
Dept: CARDIOLOGY | Facility: HOSPITAL | Age: 84
Discharge: HOME | End: 2019-06-20
Attending: SURGERY
Payer: MEDICARE

## 2019-06-20 VITALS
HEIGHT: 64 IN | HEART RATE: 62 BPM | WEIGHT: 179 LBS | DIASTOLIC BLOOD PRESSURE: 78 MMHG | SYSTOLIC BLOOD PRESSURE: 122 MMHG | BODY MASS INDEX: 30.56 KG/M2

## 2019-06-20 DIAGNOSIS — I83.813 VARICOSE VEINS OF BILATERAL LOWER EXTREMITIES WITH PAIN: ICD-10-CM

## 2019-06-20 DIAGNOSIS — I87.303 VENOUS HYPERTENSION OF BOTH LOWER EXTREMITIES: ICD-10-CM

## 2019-06-20 DIAGNOSIS — I65.23 CAROTID STENOSIS, ASYMPTOMATIC, BILATERAL: Primary | ICD-10-CM

## 2019-06-20 PROCEDURE — 1101F PR PT FALLS ASSESS DOC 0-1 FALLS W/OUT INJ PAST YR: ICD-10-PCS | Mod: CPTII,S$GLB,, | Performed by: SURGERY

## 2019-06-20 PROCEDURE — 99214 PR OFFICE/OUTPT VISIT, EST, LEVL IV, 30-39 MIN: ICD-10-PCS | Mod: S$GLB,,, | Performed by: SURGERY

## 2019-06-20 PROCEDURE — 99214 OFFICE O/P EST MOD 30 MIN: CPT | Mod: S$GLB,,, | Performed by: SURGERY

## 2019-06-20 PROCEDURE — 1101F PT FALLS ASSESS-DOCD LE1/YR: CPT | Mod: CPTII,S$GLB,, | Performed by: SURGERY

## 2019-06-20 PROCEDURE — 99999 PR PBB SHADOW E&M-EST. PATIENT-LVL III: ICD-10-PCS | Mod: PBBFAC,,, | Performed by: SURGERY

## 2019-06-20 PROCEDURE — 93970 EXTREMITY STUDY: CPT | Mod: 50,TC

## 2019-06-20 PROCEDURE — 93970 CV US LOWER VENOUS INSUFFICIENCY BILATERAL (CUPID ONLY): ICD-10-PCS | Mod: 26,,, | Performed by: SURGERY

## 2019-06-20 PROCEDURE — 93970 EXTREMITY STUDY: CPT | Mod: 26,,, | Performed by: SURGERY

## 2019-06-20 PROCEDURE — 99999 PR PBB SHADOW E&M-EST. PATIENT-LVL III: CPT | Mod: PBBFAC,,, | Performed by: SURGERY

## 2019-06-20 NOTE — LETTER
June 20, 2019        Margaret Stern MD  [address] [facility]  [place] [address]             [facility]  [address] [facility] [address]  [place] [address]  Phone: [phone]  Fax: [phone]   Patient: Patito Mcintyre   MR Number: [id number]   YOB: 1925   Date of Visit: 6/20/2019       Dear Dr. Stern:    Thank you for referring Patito Mcintyre to me for evaluation. Below are the relevant portions of my assessment and plan of care.            If you have questions, please do not hesitate to call me. I look forward to following Patito along with you.    Sincerely,      Demond Miller MD           CC  No Recipients

## 2019-06-20 NOTE — PATIENT INSTRUCTIONS
Carotid Artery Problems: Blockage     A healthy carotid artery lets blood flow easily to the brain.   The blood carries oxygen and nutrients throughout the body. The carotid arteries are large blood vessels that carry blood to the brain. Certain health problems can make the inside of the carotid arteries narrow and rough. Over time, this damage increases the chances of having a stroke. A stroke is a sudden loss of brain function.   Open carotid arteries  In a healthy carotid artery, the inside of the artery is open. The lining of the artery wall is also smooth. This lets blood flow freely from the heart to the brain. The brain gets all the oxygen and nutrients it needs to function well.  Narrowed carotid arteries  Health factors, such as high blood pressure, high cholesterol, smoking, and diabetes, can damage artery walls and make them rough. This allows cholesterol and other particles in the blood to stick to the artery walls and form plaque or fatty deposits. As the plaque builds up, it can narrow the artery. Blood may also collect on the plaque and form blood clots.  How a stroke happens     Emboli can enter the bloodstream and travel to the brain. Brain tissue is damaged when emboli block arteries in the brain.   A stroke can happen when plaque in the carotid artery ruptures. This can allow small pieces of plaque to break off into the bloodstream. At the same time, rupture can make more blood clots. The pieces of plaque and tiny blood clots or emboli can flow in the blood until they get stuck in a small blood vessel in the brain. This blocks blood flow to part of the brain and causes a stroke.  Date Last Reviewed: 6/8/2015  © 6645-2234 [employer] [address] [place] [address], [place] [address]. All rights reserved. This information is not intended as a substitute for professional medical care. Always follow your healthcare professional's instructions.

## 2019-06-20 NOTE — PROGRESS NOTES
Demond Miller MD VI                       [facility]                         06/20/2019    HPI:  Patito Mcintyre is a 89 y.o. female with   Patient Active Problem List   Diagnosis    Osteoporosis, postmenopausal    Essential tremor    Macular degeneration of left eye    Osteoarthritis    Varicose veins    History of polymyalgia rheumatica    Overweight (BMI 25.0-29.9)    Type 2 diabetes, controlled, with neuropathy    Post herpetic neuralgia    Chronic low back pain    Benign hypertension with chronic kidney disease, stage III    Hyperlipidemia    GERD (gastroesophageal reflux disease)    Postmenopausal atrophic vaginitis    Atherosclerosis of abdominal aorta    Stenosis of abdominal aorta    Mild carotid artery disease    Peripheral vascular disease    History of DVT (deep vein thrombosis)    Recurrent UTI    OAB (overactive bladder)    Left posterior capsular opacification    Secondary hyperparathyroidism of renal origin    Chronic venous insufficiency    being managed by PCP and specialists who is here today for evaluation of LLE DVT, KAREEM and SMA/L renal artery atherosclerosis.  Pt has had 3 DVT of LLE.  S/p BLE saphenous stripping 50 yrs ago.  Patient states location is LLE occurring for several years.  Associated signs and symptoms include edema, varicose veins.  Symptoms began after first born child.  Alleviating factors include elevation.  Worsening factors include dependency.  No wounds to BLE.  No TIA or CVA. Functional status unlimited.  No symptoms of chronic mesenteric ischemia.    no MI  no Stroke  Tobacco use: no    3/2019: c/o L knee pain, intermittent at night.  Denies BLE edema.  No heaviness in BLE.  +compression although stockings 10 yrs old.  No limitation to functional activity, enjoying hobbies without issues.  Not compliant with low Na diet.  No excessive water intake.  +elevation.      Interval history: No new issues.  S/p L knee injection  without improvement.    Past Medical History:   Diagnosis Date    Atherosclerosis of abdominal aorta     noted on CT scan 10/27/2015    Back pain     BPPV (benign paroxysmal positional vertigo)     Cancer     skin cancer    Chronic kidney disease, stage III (moderate)     Dependent edema     Diabetes with neurologic complications     DVT (deep venous thrombosis)     Essential tremor     Hearing loss     with hearing aides 1/2014    History of shingles 2017    Hyperlipidemia LDL goal <100     Hypertension     Left posterior capsular opacification 3/23/2017    Macular degeneration of left eye     Macular hole     od    Mild carotid artery disease     Mild hyperparathyroidism     Osteoarthritis     Osteoporosis, postmenopausal     Overweight(278.02)     Peripheral vascular disease     Polymyalgia rheumatica     followed by rheumatology, Dr. Mendez    Stenosis of abdominal aorta     noted on CT scan 10/27/2015; probable high-grade stenosis at the origin of the SMA and left renal arteries    Type II or unspecified type diabetes mellitus without mention of complication, not stated as uncontrolled     Urinary incontinence     Varicose veins      Past Surgical History:   Procedure Laterality Date    bilateral cataract surgery      bilateral leg vein dilation      CATARACT EXTRACTION      ou    left knee arthroscopy      left macular degeneration eye surgery      right knee surgery      SKIN CANCER EXCISION  2002, 2017    Basil Cell; face    TOTAL ABDOMINAL HYSTERECTOMY      YAG Laser Capsulotomy Left 03/23/2017    Dr. Faulkner     Family History   Problem Relation Age of Onset    Hypertension Unknown     Edema Unknown     Breast cancer Sister     No Known Problems Mother     No Known Problems Father     No Known Problems Brother     No Known Problems Maternal Aunt     No Known Problems Maternal Uncle     No Known Problems Paternal Aunt     No Known Problems Paternal Uncle      No Known Problems Maternal Grandmother     No Known Problems Maternal Grandfather     No Known Problems Paternal Grandmother     No Known Problems Paternal Grandfather     Amblyopia Neg Hx     Blindness Neg Hx     Cataracts Neg Hx     Diabetes Neg Hx     Glaucoma Neg Hx     Macular degeneration Neg Hx     Retinal detachment Neg Hx     Strabismus Neg Hx     Stroke Neg Hx     Thyroid disease Neg Hx     Cancer Neg Hx      Social History     Socioeconomic History    Marital status:      Spouse name: Not on file    Number of children: Not on file    Years of education: Not on file    Highest education level: Not on file   Occupational History    Not on file   Social Needs    Financial resource strain: Not on file    Food insecurity:     Worry: Not on file     Inability: Not on file    Transportation needs:     Medical: Not on file     Non-medical: Not on file   Tobacco Use    Smoking status: Never Smoker    Smokeless tobacco: Never Used   Substance and Sexual Activity    Alcohol use: No    Drug use: No    Sexual activity: Never   Lifestyle    Physical activity:     Days per week: Not on file     Minutes per session: Not on file    Stress: Not on file   Relationships    Social connections:     Talks on phone: Not on file     Gets together: Not on file     Attends Confucianism service: Not on file     Active member of club or organization: Not on file     Attends meetings of clubs or organizations: Not on file     Relationship status: Not on file   Other Topics Concern    Not on file   Social History Narrative    She lives by herself. She is originally from [place].       Current Outpatient Medications:     ACETAMINOPHEN (TYLENOL ORAL), Take 1 tablet by mouth as needed., Disp: , Rfl:     aspirin (ECOTRIN) 81 MG EC tablet, Take 1 tablet by mouth Daily. Pt. Take orange flavored chewable tablet., Disp: , Rfl:     atorvastatin (LIPITOR) 40 MG tablet, TAKE 1 TABLET BY MOUTH EVERY EVENING,  Disp: 90 tablet, Rfl: 3    CALCIUM CARBONATE/VITAMIN D3 (OS-WENDY 500 + D3 ORAL), Take by mouth., Disp: , Rfl:     GLUCOSAMINE HCL/CHONDR LEARY A NA (OSTEO BI-FLEX ORAL), Take 1 tablet by mouth once daily., Disp: , Rfl:     losartan (COZAAR) 50 MG tablet, Take 1 tablet (50 mg total) by mouth once daily., Disp: 90 tablet, Rfl: 3    OMEGA-3S/DHA/EPA/FISH OIL (OMEGA 3 ORAL), Take 550 mg by mouth. 1 Capsule Oral Every day, Disp: , Rfl:     omeprazole (PRILOSEC) 20 MG capsule, TAKE 1 CAPSULE BY MOUTH TWICE DAILY, Disp: 180 capsule, Rfl: 0    rivaroxaban (XARELTO) 20 mg Tab, Take 1 tablet (20 mg total) by mouth once daily., Disp: 90 tablet, Rfl: 3    vitamin E 200 UNIT capsule, Take 200 Units by mouth once daily., Disp: , Rfl:     REVIEW OF SYSTEMS:  General: No fevers or chills; ENT: No sore throat; Allergy and Immunology: no persistent infections; Hematological and Lymphatic: No history of bleeding or easy bruising; Endocrine: negative; Respiratory: no cough, shortness of breath, or wheezing; Cardiovascular: no chest pain or dyspnea on exertion; Gastrointestinal: no abdominal pain/back, change in bowel habits, or bloody stools; Genito-Urinary: no dysuria, trouble voiding, or hematuria; Musculoskeletal: negative; Neurological: no TIA or stroke symptoms; Psychiatric: no nervousness, anxiety or depression.    PHYSICAL EXAM:      Pulse: 62         General appearance:  Alert, well-appearing, and in no distress.  Oriented to person, place, and time                    Neurological: Normal speech, no focal findings noted; CN II - XII grossly intact. RLE with sensation to light touch, LLE with sensation to light touch.            Musculoskeletal: Digits/nail without cyanosis/clubbing.  Strength 5/5 BLE.                    Neck: Supple, no significant adenopathy                  Chest:  Clear to auscultation, no wheezes, rales or rhonchi, symmetric air entry. No use of accessory muscles               Cardiac: Normal rate and  regular rhythm, S1 and S2 normal            Abdomen: Soft, nontender, nondistended, no masses or organomegaly, no hernia     No rebound tenderness noted; bowel sounds normal     No groin adenopathy      Extremities:   2+ R femoral pulse, 2+ L femoral pulse     1+ R popliteal pulse, 1+ L popliteal pulse     1+ R PT pulse, 1+ L PT pulse     1+ R DP pulse, 1+ L DP pulse     2+ RLE edema, 2+ LLE edema    Skin: RLE without ulcer; LLE without ulcer    CEAP 3/3 with large varicose veins bilaterally    LAB RESULTS:  No results found for: CBC  Lab Results   Component Value Date    LABPROT 10.8 08/06/2016    INR 1.0 08/06/2016     Lab Results   Component Value Date     06/24/2018    K 4.7 06/24/2018     06/24/2018    CO2 20 (L) 06/24/2018    GLU 88 06/24/2018    BUN 19 06/24/2018    CREATININE 1.0 06/24/2018    CALCIUM 9.7 06/24/2018    ANIONGAP 13 06/24/2018    EGFRNONAA 49 (A) 06/24/2018     Lab Results   Component Value Date    WBC 7.00 06/24/2018    RBC 4.59 06/24/2018    HGB 14.6 06/24/2018    HCT 43.8 06/24/2018    MCV 95 06/24/2018    MCH 31.8 (H) 06/24/2018    MCHC 33.3 06/24/2018    RDW 13.4 06/24/2018     (L) 06/24/2018    MPV 10.0 06/24/2018    GRAN 3.9 06/24/2018    GRAN 55.2 06/24/2018    LYMPH 1.7 06/24/2018    LYMPH 24.9 06/24/2018    MONO 1.0 06/24/2018    MONO 14.0 06/24/2018    EOS 0.4 06/24/2018    BASO 0.02 06/24/2018    EOSINOPHIL 5.3 06/24/2018    BASOPHIL 0.3 06/24/2018    DIFFMETHOD Automated 06/24/2018     .  Lab Results   Component Value Date    HGBA1C 6.0 (H) 11/01/2018       IMAGING:  All pertinent imaging has been reviewed and interpreted independently.    LLE DVT femoral vein to PT vein    Carotid US <50% stenosis bilaterally    CT A/P non contrast with calcifications of SMA and L renal origin    6/20/19 VENOUS US: no RLE DVT or reflux.  No LLE DVT, L GSV reflux at SFJ and mid thigh.  Discontinuous distally, no SSV reflux bilat.    IMP/PLAN:  89 y.o. female with   Patient  Active Problem List   Diagnosis    Osteoporosis, postmenopausal    Essential tremor    Macular degeneration of left eye    Osteoarthritis    Varicose veins    History of polymyalgia rheumatica    Overweight (BMI 25.0-29.9)    Type 2 diabetes, controlled, with neuropathy    Post herpetic neuralgia    Chronic low back pain    Benign hypertension with chronic kidney disease, stage III    Hyperlipidemia    GERD (gastroesophageal reflux disease)    Postmenopausal atrophic vaginitis    Atherosclerosis of abdominal aorta    Stenosis of abdominal aorta    Mild carotid artery disease    Peripheral vascular disease    History of DVT (deep vein thrombosis)    Recurrent UTI    OAB (overactive bladder)    Left posterior capsular opacification    Secondary hyperparathyroidism of renal origin    Chronic venous insufficiency    being managed by PCP and specialists who is here today for evaluation of LLE DVT and CT findings with concern for SMA and L renal artery stenosis.    -LLE DVT unprovoked with h/o 3 DVT and treatment completed - agree with Hematology recs and anticoagulation; no iliac involvement or findings of phlegmasia on exam.  If symptoms worsen or leg becomes compromised, will repeat venous US   -Recommend compression with Rx stockings, elevation, dietary changes associated with water and sodium intake discussed at length with patient  -BLE venous reflux US with min L GSV reflux, asymptomatic -cont above  -Carotid stenosis prev being followed by Dr. Landin - will order repeat US 9/2019 for yearly routine surveillance and discuss coordination of care  -CT incidental findings of atherosclerotic disease of SMA and L renal artery origin without symptoms of CMI or LYN - no vascular surgical intervention warranted at this time.  No further workup indicated unless pt develops symptoms.  -L knee pain - cont Ortho recs  -RTC 3 mo with carotid US    I spent 20 minutes evaluating this patient and greater  than 50% of the time was spent counseling, coordinator care and discussing the plan of care.  All questions were answered and patient stated understanding with agreement with the above treatment plan.    Demond Miller MD [facility]  Vascular and Endovascular Surgery

## 2019-06-21 LAB
LEFT GREAT SAPHENOUS JUNCTION DIA: 0.6 CM
LEFT GREAT SAPHENOUS JUNCTION REFLUX: 746 MS
LEFT GREAT SAPHENOUS MIDDLE THIGH DIA: 0.6 CM
LEFT GREAT SAPHENOUS MIDDLE THIGH REFLUX: 966 MS
LEFT SMALL SAPHENOUS KNEE DIA: 0.4 CM
LEFT SMALL SAPHENOUS SPJ DIA: 0.3 CM
RIGHT GREAT SAPHENOUS JUNCTION DIA: 0.6 CM
RIGHT GREAT SAPHENOUS MIDDLE THIGH DIA: 0.5 CM
RIGHT SMALL SAPHENOUS KNEE DIA: 0.3 CM
RIGHT SMALL SAPHENOUS SPJ DIA: 0.2 CM

## 2019-07-08 ENCOUNTER — PATIENT OUTREACH (OUTPATIENT)
Dept: ADMINISTRATIVE | Facility: OTHER | Age: 84
End: 2019-07-08

## 2019-07-08 DIAGNOSIS — E11.40 TYPE 2 DIABETES, CONTROLLED, WITH NEUROPATHY: Primary | ICD-10-CM

## 2019-07-12 DIAGNOSIS — I10 ESSENTIAL HYPERTENSION, BENIGN: ICD-10-CM

## 2019-07-12 RX ORDER — LOSARTAN POTASSIUM 50 MG/1
TABLET ORAL
Qty: 90 TABLET | Refills: 0 | Status: SHIPPED | OUTPATIENT
Start: 2019-07-12 | End: 2019-10-17 | Stop reason: SDUPTHER

## 2019-07-29 ENCOUNTER — OFFICE VISIT (OUTPATIENT)
Dept: FAMILY MEDICINE | Facility: CLINIC | Age: 84
End: 2019-07-29
Payer: MEDICARE

## 2019-07-29 VITALS
BODY MASS INDEX: 29.67 KG/M2 | WEIGHT: 173.81 LBS | HEART RATE: 72 BPM | HEIGHT: 64 IN | SYSTOLIC BLOOD PRESSURE: 146 MMHG | OXYGEN SATURATION: 93 % | TEMPERATURE: 98 F | DIASTOLIC BLOOD PRESSURE: 74 MMHG

## 2019-07-29 DIAGNOSIS — N25.81 SECONDARY HYPERPARATHYROIDISM OF RENAL ORIGIN: ICD-10-CM

## 2019-07-29 DIAGNOSIS — M54.5 CHRONIC LOW BACK PAIN, UNSPECIFIED BACK PAIN LATERALITY, WITH SCIATICA PRESENCE UNSPECIFIED: ICD-10-CM

## 2019-07-29 DIAGNOSIS — I77.9 MILD CAROTID ARTERY DISEASE: ICD-10-CM

## 2019-07-29 DIAGNOSIS — G89.29 CHRONIC LOW BACK PAIN, UNSPECIFIED BACK PAIN LATERALITY, WITH SCIATICA PRESENCE UNSPECIFIED: ICD-10-CM

## 2019-07-29 DIAGNOSIS — E11.40 TYPE 2 DIABETES, CONTROLLED, WITH NEUROPATHY: ICD-10-CM

## 2019-07-29 DIAGNOSIS — I73.9 PERIPHERAL VASCULAR DISEASE: Chronic | ICD-10-CM

## 2019-07-29 DIAGNOSIS — M81.0 OSTEOPOROSIS, POSTMENOPAUSAL: ICD-10-CM

## 2019-07-29 DIAGNOSIS — M89.9 DISORDER OF BONE AND CARTILAGE: ICD-10-CM

## 2019-07-29 DIAGNOSIS — Z99.89 AMBULATES WITH CANE: ICD-10-CM

## 2019-07-29 DIAGNOSIS — E66.3 OVERWEIGHT (BMI 25.0-29.9): ICD-10-CM

## 2019-07-29 DIAGNOSIS — I12.9 BENIGN HYPERTENSION WITH CHRONIC KIDNEY DISEASE, STAGE III: ICD-10-CM

## 2019-07-29 DIAGNOSIS — I70.0 ATHEROSCLEROSIS OF ABDOMINAL AORTA: ICD-10-CM

## 2019-07-29 DIAGNOSIS — N18.30 BENIGN HYPERTENSION WITH CHRONIC KIDNEY DISEASE, STAGE III: ICD-10-CM

## 2019-07-29 DIAGNOSIS — Z86.718 HISTORY OF DVT (DEEP VEIN THROMBOSIS): Chronic | ICD-10-CM

## 2019-07-29 DIAGNOSIS — E78.2 MIXED HYPERLIPIDEMIA: Chronic | ICD-10-CM

## 2019-07-29 DIAGNOSIS — Q25.1 STENOSIS OF ABDOMINAL AORTA: ICD-10-CM

## 2019-07-29 DIAGNOSIS — Z00.00 ROUTINE MEDICAL EXAM: Primary | ICD-10-CM

## 2019-07-29 DIAGNOSIS — G25.0 ESSENTIAL TREMOR: ICD-10-CM

## 2019-07-29 DIAGNOSIS — M94.9 DISORDER OF BONE AND CARTILAGE: ICD-10-CM

## 2019-07-29 DIAGNOSIS — I87.2 CHRONIC VENOUS INSUFFICIENCY: ICD-10-CM

## 2019-07-29 DIAGNOSIS — K21.9 GASTROESOPHAGEAL REFLUX DISEASE WITHOUT ESOPHAGITIS: Chronic | ICD-10-CM

## 2019-07-29 PROCEDURE — 99397 PER PM REEVAL EST PAT 65+ YR: CPT | Mod: S$GLB,,, | Performed by: INTERNAL MEDICINE

## 2019-07-29 PROCEDURE — 99999 PR PBB SHADOW E&M-EST. PATIENT-LVL III: CPT | Mod: PBBFAC,,, | Performed by: INTERNAL MEDICINE

## 2019-07-29 PROCEDURE — 99999 PR PBB SHADOW E&M-EST. PATIENT-LVL III: ICD-10-PCS | Mod: PBBFAC,,, | Performed by: INTERNAL MEDICINE

## 2019-07-29 PROCEDURE — 99397 PR PREVENTIVE VISIT,EST,65 & OVER: ICD-10-PCS | Mod: S$GLB,,, | Performed by: INTERNAL MEDICINE

## 2019-07-29 NOTE — PROGRESS NOTES
HISTORY OF PRESENT ILLNESS:  Patito Mcintyre is a 89 y.o. female who presents to the clinic today for a routine medical physical exam. Her last physical exam was approximately 1 years(s) ago.        PAST MEDICAL HISTORY:  Past Medical History:   Diagnosis Date    Atherosclerosis of abdominal aorta     noted on CT scan 10/27/2015    Back pain     BPPV (benign paroxysmal positional vertigo)     Cancer     skin cancer    Chronic kidney disease, stage III (moderate)     Dependent edema     Diabetes with neurologic complications     DVT (deep venous thrombosis)     Essential tremor     Hearing loss     with hearing aides 1/2014    History of shingles 2017    Hyperlipidemia LDL goal <100     Hypertension     Left posterior capsular opacification 3/23/2017    Macular degeneration of left eye     Macular hole     od    Mild carotid artery disease     Mild hyperparathyroidism     Osteoarthritis     Osteoporosis, postmenopausal     Overweight(278.02)     Peripheral vascular disease     Polymyalgia rheumatica     followed by rheumatology, Dr. Mendez    Stenosis of abdominal aorta     noted on CT scan 10/27/2015; probable high-grade stenosis at the origin of the SMA and left renal arteries    Type II or unspecified type diabetes mellitus without mention of complication, not stated as uncontrolled     Urinary incontinence     Varicose veins        PAST SURGICAL HISTORY:  Past Surgical History:   Procedure Laterality Date    bilateral cataract surgery      bilateral leg vein dilation      CATARACT EXTRACTION      ou    left knee arthroscopy      left macular degeneration eye surgery      right knee surgery      SKIN CANCER EXCISION  2002, 2017    Basil Cell; face    TOTAL ABDOMINAL HYSTERECTOMY      YAG Laser Capsulotomy Left 03/23/2017    Dr. Faulkner       SOCIAL HISTORY:  Social History     Socioeconomic History    Marital status:      Spouse name: Not on file    Number of  children: Not on file    Years of education: Not on file    Highest education level: Not on file   Occupational History    Not on file   Social Needs    Financial resource strain: Not on file    Food insecurity:     Worry: Not on file     Inability: Not on file    Transportation needs:     Medical: Not on file     Non-medical: Not on file   Tobacco Use    Smoking status: Never Smoker    Smokeless tobacco: Never Used   Substance and Sexual Activity    Alcohol use: No    Drug use: No    Sexual activity: Never   Lifestyle    Physical activity:     Days per week: Not on file     Minutes per session: Not on file    Stress: Not at all   Relationships    Social connections:     Talks on phone: Not on file     Gets together: Not on file     Attends Sikh service: Not on file     Active member of club or organization: Not on file     Attends meetings of clubs or organizations: Not on file     Relationship status: Not on file   Other Topics Concern    Not on file   Social History Narrative    She lives by herself. She is originally from [place].       FAMILY HISTORY:  Family History   Problem Relation Age of Onset    Hypertension Unknown     Edema Unknown     No Known Problems Mother     No Known Problems Father     No Known Problems Brother     No Known Problems Maternal Aunt     No Known Problems Maternal Uncle     No Known Problems Paternal Aunt     No Known Problems Paternal Uncle     No Known Problems Maternal Grandmother     No Known Problems Maternal Grandfather     No Known Problems Paternal Grandmother     No Known Problems Paternal Grandfather     Breast cancer Sister     Aneurysm Sister     Anuerysm Other     Amblyopia Neg Hx     Blindness Neg Hx     Cataracts Neg Hx     Diabetes Neg Hx     Glaucoma Neg Hx     Macular degeneration Neg Hx     Retinal detachment Neg Hx     Strabismus Neg Hx     Stroke Neg Hx     Thyroid disease Neg Hx     Cancer Neg Hx        ALLERGIES  AND MEDICATIONS: updated and reviewed.  Review of patient's allergies indicates:   Allergen Reactions    Inderal [propranolol]     Indomethacin sodium      Other reaction(s): Vomiting  Other reaction(s): Nausea     Medication List with Changes/Refills   Current Medications    ACETAMINOPHEN (TYLENOL ORAL)    Take 1 tablet by mouth as needed.    ASPIRIN (ECOTRIN) 81 MG EC TABLET    Take 1 tablet by mouth Daily. Pt. Take orange flavored chewable tablet.    ATORVASTATIN (LIPITOR) 40 MG TABLET    TAKE 1 TABLET BY MOUTH EVERY EVENING    CALCIUM CARBONATE/VITAMIN D3 (OS-WENDY 500 + D3 ORAL)    Take by mouth.    GLUCOSAMINE HCL/CHONDR LEARY A NA (OSTEO BI-FLEX ORAL)    Take 1 tablet by mouth once daily.    LOSARTAN (COZAAR) 50 MG TABLET    TAKE 1 TABLET(50 MG) BY MOUTH EVERY DAY    OMEGA-3S/DHA/EPA/FISH OIL (OMEGA 3 ORAL)    Take 550 mg by mouth. 1 Capsule Oral Every day    OMEPRAZOLE (PRILOSEC) 20 MG CAPSULE    TAKE 1 CAPSULE BY MOUTH TWICE DAILY    RIVAROXABAN (XARELTO) 20 MG TAB    Take 1 tablet (20 mg total) by mouth once daily.    VITAMIN E 200 UNIT CAPSULE    Take 200 Units by mouth once daily.         CARE TEAM:  Patient Care Team:  Margaret Stern MD as PCP - General (Internal Medicine)  Maximino Martell MD as Consulting Physician (Plastic Surgery)  Scotty Landin MD as Consulting Physician (Cardiology)  Seema Alejo MD as Consulting Physician (Urology)  Javon Garza MD as Consulting Physician (Hematology and Oncology)           SCREENING HISTORY:  Health Maintenance       Date Due Completion Date    Shingles Vaccine (2 of 3) 12/10/2015 10/15/2015    Urine Microalbumin 02/19/2019 2/19/2018    Hemoglobin A1c 05/01/2019 11/1/2018    DEXA SCAN 07/17/2019 7/17/2017    Override on 7/29/2010: Done    Eye Exam 08/09/2019 (Originally 2/27/2019) 2/27/2018    Influenza Vaccine 08/01/2019 10/29/2018    Override on 10/14/2015: Done    Foot Exam 10/29/2019 10/29/2018    Lipid Panel 11/01/2019 11/1/2018  "   TETANUS VACCINE 02/21/2028 2/21/2018            REVIEW OF SYSTEMS:   The patient reports: good dietary habits.  The patient reports : that they do not exercise.  Review of Systems   Constitutional: Negative for chills, fatigue, fever and unexpected weight change.   HENT: Negative for congestion and postnasal drip.    Eyes: Negative for pain and visual disturbance.   Respiratory: Negative for cough, shortness of breath and wheezing.    Cardiovascular: Negative for chest pain, palpitations and leg swelling.   Gastrointestinal: Negative for abdominal pain, constipation, diarrhea, nausea and vomiting.   Genitourinary: Negative for dysuria.   Musculoskeletal: Positive for arthralgias (- mild; chronic). Negative for back pain.   Skin: Negative for rash.        She has a small spot on the right side of her nose that intermittently bleeds.  She has had skin cancers removed from her face before.  She is overdue for follow-up.   Neurological: Negative for weakness and headaches.   Hematological:        She had recurring DVT since her last office visit with me.  She has seen Hematology.  They recommend lifelong Xarelto as long as the benefits outweigh the risks.   Psychiatric/Behavioral: Negative for dysphoric mood and sleep disturbance. The patient is not nervous/anxious.      Breast ROS: negative for breast lumps             Physical Examination:   Vitals:    07/29/19 1035   BP: (!) 146/74   Pulse: 72   Temp: 97.9 °F (36.6 °C)     Weight: 78.8 kg (173 lb 13.3 oz)   Height: 5' 4" (162.6 cm)   Body mass index is 29.84 kg/m².      Patient did not require to have a chaperone present during the exam today.  General appearance - alert, well appearing, and in no distress and overweight  Mental status - alert, oriented to person, place, and time, normal mood, behavior, speech, dress, motor activity, and thought processes, mild difficulty with short term memory  Eyes - pupils equal and reactive, extraocular eye movements intact, " sclera anicteric  Mouth - mucous membranes moist, pharynx normal without lesions  Neck - supple, no significant adenopathy, carotids upstroke normal bilaterally, no bruits, thyroid exam: thyroid is normal in size without nodules or tenderness  Lymphatics - no palpable lymphadenopathy  Chest - clear to auscultation, no wheezes, rales or rhonchi, symmetric air entry  Heart - normal rate and regular rhythm, no gallops noted  Abdomen - not examined  Breasts - not examined  Back exam - not examined  Neurological - alert, oriented, normal speech, no focal findings noted, cranial nerves II through XII intact; she had a resting tremor noted  Musculoskeletal - no muscular tenderness noted, Mild-Moderate osteoarthritic changes noted to both knee joints. No joint effusions noted.   Extremities - pedal edema trace +, varicose veins noted; she was wearing support stockings  Skin - normal coloration and turgor, no rashes, no suspicious skin lesions noted      Lab Results   Component Value Date    HGBA1C 6.0 (H) 11/01/2018    HGBA1C 6.0 (H) 02/19/2018    HGBA1C 5.9 (H) 07/17/2017      Lab Results   Component Value Date    CHOL 132 11/01/2018    CHOL 124 07/17/2017    CHOL 111 (L) 03/28/2016     Lab Results   Component Value Date    LDLCALC 71.2 11/01/2018    LDLCALC 59.4 (L) 07/17/2017    LDLCALC 55.0 (L) 03/28/2016          ASSESSMENT AND PLAN:  1. Routine medical exam  Counseled on age appropriate medical preventative services including age appropriate cancer screenings, age appropriate eye and dental exams, over all nutritional health, need for a consistent exercise regimen, and an over all push towards maintaining a vigorous and active lifestyle.  Counseled on age appropriate vaccines and discussed upcoming health care needs based on age/gender. Discussed good sleep hygiene and stress management. I recommended against shingles vaccine at her age.    2. Type 2 diabetes, controlled, with neuropathy  Diabetes currently is  controlled for age and comorbid conditions. We discussed diabetic diet and regular exercise. We discussed home blood sugar monitoring, if appropriate - the patient does not need to test daily but can test only as needed. We discussed low sugar/low carbohydrate diet and regular exercise to prevent progression. No need for prescription medication at this time.  Diabetic complications addressed: Neuropathy pain controlled.  Patient was counseled on the need for yearly eye exam to screen for/monitor diabetic retinopathy and yearly diabetic foot exam.   - Microalbumin/creatinine urine ratio; Future    3. Benign hypertension with chronic kidney disease, stage III  Discussed sodium restriction, maintaining ideal body weight and regular exercise program as physiologic means to achieve blood pressure control. The patient will strive towards this. The current medical regimen is effective;  continue present plan and medications. Recommended patient to check home readings to monitor and see me for followup as scheduled or sooner as needed. Patient was educated that both decongestant and anti-inflammatory medication may raise blood pressure. Stable decreased kidney function. Observe. Patient counseled to avoid/minimize the use of anti-inflammatory  Medication. Discussed to stay well hydrated. Also discussed with patient that good control of blood pressure and/or diabetes, if present, will help to prevent progression.   - CBC auto differential; Future    4. Secondary hyperparathyroidism of renal origin  Stable. Asymptomatic. Observe.     5. Mixed hyperlipidemia  We discussed low fat diet and regular exercise.The current medical regimen is effective;  continue present plan and medications.    - Comprehensive metabolic panel; Future  - Lipid panel; Future    6. Gastroesophageal reflux disease without esophagitis  Symptoms controlled: yes. Reflux precautions discussed (eliminate tobacco if a smoker; minimize caffeine, chocolate and  red/white peppermint intake; avoid heavy and spicy meals; don't lay down within 2-3 hours after eating; minimize the intake of NSAIDs). Medication as needed. Patient asked to take medication breaks, if possible - discussed chronic use can limit calcium absorption (which can lead to osteopenia/osteoporosis), increases the risk for intestinal infections, and can cause kidney damage. There are also some newer studies that show possible increased risk of mortality.     7. Peripheral vascular disease/8. Chronic venous insufficiency/9. History of DVT (deep vein thrombosis)  Stable. Continue Xarelto. Followed by vascular.    10. Osteoporosis, postmenopausal  We discussed adequate calcium and vitamin D supplementation. We discussed fall precautions. She is scheduled for her BMD. Further treatment plan will be based on results     11. Essential tremor  Stable. Observe.     12. Chronic low back pain, unspecified back pain laterality, with sciatica presence unspecified  Stable. Medication as needed.     13. Atherosclerosis of abdominal aorta  Patient with Atherosclerosis of the Aorta.  Stable/asymptomatic. Currently stable on lipid lowering medication and b/p monitoring.     14. Stenosis of abdominal aorta/15. Mild carotid artery disease  Stable. Followed by vascular.    16. Overweight (BMI 25.0-29.9)  The patient is asked to make an attempt to improve diet and exercise patterns to aid in medical management of this problem.     17. Disorder of bone and cartilage    - DXA Bone Density Spine And Hip; Future    18. Ambulates with cane            Follow up in about 6 months (around 1/29/2020), or if symptoms worsen or fail to improve, for follow up chronic medical conditions.. or sooner as needed.

## 2019-08-02 ENCOUNTER — PATIENT OUTREACH (OUTPATIENT)
Dept: ADMINISTRATIVE | Facility: OTHER | Age: 84
End: 2019-08-02

## 2019-08-06 ENCOUNTER — OFFICE VISIT (OUTPATIENT)
Dept: OPTOMETRY | Facility: CLINIC | Age: 84
End: 2019-08-06
Payer: COMMERCIAL

## 2019-08-06 DIAGNOSIS — H52.12 MYOPIA, LEFT EYE: ICD-10-CM

## 2019-08-06 DIAGNOSIS — Z01.00 EYE EXAM, ROUTINE: Primary | ICD-10-CM

## 2019-08-06 LAB
LEFT EYE DM RETINOPATHY: NEGATIVE
RIGHT EYE DM RETINOPATHY: NEGATIVE

## 2019-08-06 PROCEDURE — 92015 DETERMINE REFRACTIVE STATE: CPT | Mod: S$GLB,,, | Performed by: OPTOMETRIST

## 2019-08-06 PROCEDURE — 99999 PR PBB SHADOW E&M-EST. PATIENT-LVL II: CPT | Mod: PBBFAC,,, | Performed by: OPTOMETRIST

## 2019-08-06 PROCEDURE — 99999 PR PBB SHADOW E&M-EST. PATIENT-LVL II: ICD-10-PCS | Mod: PBBFAC,,, | Performed by: OPTOMETRIST

## 2019-08-06 PROCEDURE — 92014 PR EYE EXAM, EST PATIENT,COMPREHESV: ICD-10-PCS | Mod: S$GLB,,, | Performed by: OPTOMETRIST

## 2019-08-06 PROCEDURE — 92014 COMPRE OPH EXAM EST PT 1/>: CPT | Mod: S$GLB,,, | Performed by: OPTOMETRIST

## 2019-08-06 PROCEDURE — 92015 PR REFRACTION: ICD-10-PCS | Mod: S$GLB,,, | Performed by: OPTOMETRIST

## 2019-08-06 NOTE — PROGRESS NOTES
HPI     Dls: 2/27/18 Dr. Colon     89 y/o female presents today for [facility] vision/diabetic exam.   Pt c/o film over ou off/on. Pt wears single vision glasses for near.     LBS ?     No tearing  + itching  No burning  No pain  No ha's  No floaters  No flashes    Eye meds  otc gtts ou prn     Hemoglobin A1C       Date                     Value               Ref Range             Status                11/01/2018               6.0 (H)             4.0 - 5.6 %           Final                  02/19/2018               6.0 (H)             4.0 - 5.6 %           Final                  07/17/2017               5.9 (H)             4.0 - 5.6 %           Final               ----------      Last edited by Yimi Colon, OD on 8/6/2019  1:09 PM. (History)            Assessment /Plan     For exam results, see Encounter Report.    Eye exam, routine  -Macular Hole OD  -No retinopathy noted today.  Continued control with primary care physician and annual comprehensive eye exam.  -[facility]    Myopia, left eye  Eyeglass Final Rx     Eyeglass Final Rx       Sphere Cylinder Axis Dist VA Add    Right [place]   CF +2.75    Left -0.50 +0.75 025 20/30 +2.75    Type:  Bifocal    Expiration Date:  8/6/2020                  RTC 1 yr

## 2019-08-15 RX ORDER — RIVAROXABAN 20 MG/1
TABLET, FILM COATED ORAL
Qty: 90 TABLET | Refills: 3 | Status: SHIPPED | OUTPATIENT
Start: 2019-08-15 | End: 2020-01-28 | Stop reason: SDUPTHER

## 2019-09-05 ENCOUNTER — TELEPHONE (OUTPATIENT)
Dept: VASCULAR SURGERY | Facility: CLINIC | Age: 84
End: 2019-09-05

## 2019-09-05 NOTE — TELEPHONE ENCOUNTER
Spoke to Ms. Yolanda daughter to let her know Dr. Miller says she need to call Dr. Landin to see if she can stop taking her xarelto and aspirin.

## 2019-09-09 ENCOUNTER — ANESTHESIA EVENT (OUTPATIENT)
Dept: SURGERY | Facility: HOSPITAL | Age: 84
End: 2019-09-09
Payer: MEDICARE

## 2019-09-09 ENCOUNTER — HOSPITAL ENCOUNTER (OUTPATIENT)
Dept: RADIOLOGY | Facility: CLINIC | Age: 84
Discharge: HOME | End: 2019-09-09
Attending: INTERNAL MEDICINE
Payer: MEDICARE

## 2019-09-09 DIAGNOSIS — M89.9 DISORDER OF BONE AND CARTILAGE: ICD-10-CM

## 2019-09-09 DIAGNOSIS — M94.9 DISORDER OF BONE AND CARTILAGE: ICD-10-CM

## 2019-09-09 PROCEDURE — 77080 DXA BONE DENSITY AXIAL: CPT | Mod: 26,,, | Performed by: INTERNAL MEDICINE

## 2019-09-09 PROCEDURE — 77080 DEXA BONE DENSITY SPINE HIP: ICD-10-PCS | Mod: 26,,, | Performed by: INTERNAL MEDICINE

## 2019-09-09 PROCEDURE — 77080 DXA BONE DENSITY AXIAL: CPT | Mod: TC,PO

## 2019-09-11 ENCOUNTER — HOSPITAL ENCOUNTER (OUTPATIENT)
Dept: PREADMISSION TESTING | Facility: HOSPITAL | Age: 84
Discharge: HOME | End: 2019-09-11
Attending: PLASTIC SURGERY
Payer: MEDICARE

## 2019-09-11 VITALS
DIASTOLIC BLOOD PRESSURE: 77 MMHG | SYSTOLIC BLOOD PRESSURE: 152 MMHG | HEART RATE: 105 BPM | BODY MASS INDEX: 27.67 KG/M2 | OXYGEN SATURATION: 100 % | RESPIRATION RATE: 20 BRPM | TEMPERATURE: 97 F | WEIGHT: 172.19 LBS | HEIGHT: 66 IN

## 2019-09-11 NOTE — DISCHARGE INSTRUCTIONS
"Your procedure  is scheduled for __Friday September 13, 2019________.    Call [phone] between 2pm and 5pm on _Thursday SEPT. 12, 2019______to find out your arrival time for the day of surgery.    Report to Same Day Surgery Unit at ____ AM on the 2nd floor of the hospital.  Use the front entrance of the hospital.  The front doors of the hospital open promptly at 5:30am.  If you need wheelchair assistance, call [phone] from your cell phone, or call "0" from the courtesy phone in the lobby.    Important instructions:   Do not eat or drink after 12 midnight, including water.  It is okay to brush your teeth.                                                                                                                                                                                                        Do not have gum, candy or mints.    STOP    XARELTO      AND    VITAMIN   E  TODAY      Stop taking Aspirin, Ibuprofen, Motrin and Aleve , Fish oil, and Vitamin E for at least 7 days before your surgery.                                                                                                                                                                                        You may use Tylenol unless otherwise instructed by your doctor.       Prep instructions:     SHOWER   OTHER_____________     Please shower the night before   OR   the morning of your surgery.       You may wear deodorant only.      Do not wear powder, body lotion or perfume/cologne.     Do not wear any jewelry or have any metal on your body.     If you are going home on the same day of surgery, you must arrange for a family member or a friend to drive you home.  Public transportation is prohibited.  You will not be able to drive home if you were given anesthesia or sedation.     Wear loose fitting clothes allowing for bandages.     Please leave money and valuables home.       You may bring your cell phone.     Call the " doctor if fever or illness should occur before your surgery.    Call [phone] to contact us here if needed.

## 2019-09-13 ENCOUNTER — ANESTHESIA (OUTPATIENT)
Dept: SURGERY | Facility: HOSPITAL | Age: 84
End: 2019-09-13
Payer: MEDICARE

## 2019-09-13 ENCOUNTER — HOSPITAL ENCOUNTER (OUTPATIENT)
Facility: HOSPITAL | Age: 84
Discharge: HOME | End: 2019-09-13
Attending: PLASTIC SURGERY | Admitting: PLASTIC SURGERY
Payer: MEDICARE

## 2019-09-13 VITALS
DIASTOLIC BLOOD PRESSURE: 76 MMHG | TEMPERATURE: 98 F | BODY MASS INDEX: 27.67 KG/M2 | HEART RATE: 80 BPM | OXYGEN SATURATION: 98 % | HEIGHT: 66 IN | SYSTOLIC BLOOD PRESSURE: 138 MMHG | RESPIRATION RATE: 16 BRPM | WEIGHT: 172.19 LBS

## 2019-09-13 DIAGNOSIS — C44.81 BASAL CELL CARCINOMA OF OVERLAPPING SITES OF SKIN: Primary | ICD-10-CM

## 2019-09-13 PROCEDURE — D9220A PRA ANESTHESIA: Mod: CRNA,,, | Performed by: NURSE ANESTHETIST, CERTIFIED REGISTERED

## 2019-09-13 PROCEDURE — D9220A PRA ANESTHESIA: Mod: ANES,,, | Performed by: ANESTHESIOLOGY

## 2019-09-13 PROCEDURE — 88305 TISSUE EXAM BY PATHOLOGIST: CPT | Mod: 59 | Performed by: PATHOLOGY

## 2019-09-13 PROCEDURE — 37000009 HC ANESTHESIA EA ADD 15 MINS: Performed by: PLASTIC SURGERY

## 2019-09-13 PROCEDURE — 88305 TISSUE EXAM BY PATHOLOGIST: CPT | Mod: 26,,, | Performed by: PATHOLOGY

## 2019-09-13 PROCEDURE — 25000003 PHARM REV CODE 250: Performed by: NURSE ANESTHETIST, CERTIFIED REGISTERED

## 2019-09-13 PROCEDURE — 36000707: Performed by: PLASTIC SURGERY

## 2019-09-13 PROCEDURE — D9220A PRA ANESTHESIA: ICD-10-PCS | Mod: ANES,,, | Performed by: ANESTHESIOLOGY

## 2019-09-13 PROCEDURE — 63600175 PHARM REV CODE 636 W HCPCS: Performed by: NURSE ANESTHETIST, CERTIFIED REGISTERED

## 2019-09-13 PROCEDURE — 63600175 PHARM REV CODE 636 W HCPCS: Performed by: PLASTIC SURGERY

## 2019-09-13 PROCEDURE — 88331 PATH CONSLTJ SURG 1 BLK 1SPC: CPT | Mod: 26,,, | Performed by: PATHOLOGY

## 2019-09-13 PROCEDURE — 88331 TISSUE SPECIMEN TO PATHOLOGY - SURGERY: ICD-10-PCS | Mod: 26,,, | Performed by: PATHOLOGY

## 2019-09-13 PROCEDURE — 88305 TISSUE SPECIMEN TO PATHOLOGY - SURGERY: ICD-10-PCS | Mod: 26,,, | Performed by: PATHOLOGY

## 2019-09-13 PROCEDURE — 37000008 HC ANESTHESIA 1ST 15 MINUTES: Performed by: PLASTIC SURGERY

## 2019-09-13 PROCEDURE — 88331 PATH CONSLTJ SURG 1 BLK 1SPC: CPT | Performed by: PATHOLOGY

## 2019-09-13 PROCEDURE — 71000015 HC POSTOP RECOV 1ST HR: Performed by: PLASTIC SURGERY

## 2019-09-13 PROCEDURE — D9220A PRA ANESTHESIA: ICD-10-PCS | Mod: CRNA,,, | Performed by: NURSE ANESTHETIST, CERTIFIED REGISTERED

## 2019-09-13 PROCEDURE — 36000706: Performed by: PLASTIC SURGERY

## 2019-09-13 PROCEDURE — 63600175 PHARM REV CODE 636 W HCPCS: Performed by: ANESTHESIOLOGY

## 2019-09-13 PROCEDURE — 71000016 HC POSTOP RECOV ADDL HR: Performed by: PLASTIC SURGERY

## 2019-09-13 PROCEDURE — 25000003 PHARM REV CODE 250: Performed by: PLASTIC SURGERY

## 2019-09-13 RX ORDER — CEFAZOLIN SODIUM 2 G/50ML
2 SOLUTION INTRAVENOUS
Status: DISCONTINUED | OUTPATIENT
Start: 2019-09-13 | End: 2019-09-13 | Stop reason: HOSPADM

## 2019-09-13 RX ORDER — HYDRALAZINE HYDROCHLORIDE 20 MG/ML
INJECTION INTRAMUSCULAR; INTRAVENOUS
Status: DISCONTINUED | OUTPATIENT
Start: 2019-09-13 | End: 2019-09-13

## 2019-09-13 RX ORDER — MUPIROCIN 20 MG/G
1 OINTMENT TOPICAL 2 TIMES DAILY
Status: DISCONTINUED | OUTPATIENT
Start: 2019-09-13 | End: 2019-09-13 | Stop reason: HOSPADM

## 2019-09-13 RX ORDER — BUPIVACAINE HYDROCHLORIDE AND EPINEPHRINE 2.5; 5 MG/ML; UG/ML
INJECTION, SOLUTION EPIDURAL; INFILTRATION; INTRACAUDAL; PERINEURAL
Status: DISCONTINUED | OUTPATIENT
Start: 2019-09-13 | End: 2019-09-13 | Stop reason: HOSPADM

## 2019-09-13 RX ORDER — LIDOCAINE HYDROCHLORIDE 10 MG/ML
1 INJECTION, SOLUTION EPIDURAL; INFILTRATION; INTRACAUDAL; PERINEURAL ONCE
Status: DISCONTINUED | OUTPATIENT
Start: 2019-09-13 | End: 2019-09-13 | Stop reason: HOSPADM

## 2019-09-13 RX ORDER — SODIUM CHLORIDE, SODIUM LACTATE, POTASSIUM CHLORIDE, CALCIUM CHLORIDE 600; 310; 30; 20 MG/100ML; MG/100ML; MG/100ML; MG/100ML
INJECTION, SOLUTION INTRAVENOUS CONTINUOUS
Status: DISCONTINUED | OUTPATIENT
Start: 2019-09-13 | End: 2019-09-13 | Stop reason: HOSPADM

## 2019-09-13 RX ORDER — LIDOCAINE HYDROCHLORIDE 10 MG/ML
INJECTION INFILTRATION; PERINEURAL
Status: DISCONTINUED | OUTPATIENT
Start: 2019-09-13 | End: 2019-09-13 | Stop reason: HOSPADM

## 2019-09-13 RX ORDER — FENTANYL CITRATE 50 UG/ML
INJECTION, SOLUTION INTRAMUSCULAR; INTRAVENOUS
Status: DISCONTINUED | OUTPATIENT
Start: 2019-09-13 | End: 2019-09-13

## 2019-09-13 RX ORDER — HYDROCODONE BITARTRATE AND ACETAMINOPHEN 5; 325 MG/1; MG/1
1 TABLET ORAL EVERY 4 HOURS PRN
Status: DISCONTINUED | OUTPATIENT
Start: 2019-09-13 | End: 2019-09-13 | Stop reason: HOSPADM

## 2019-09-13 RX ORDER — LABETALOL HYDROCHLORIDE 5 MG/ML
INJECTION, SOLUTION INTRAVENOUS
Status: DISCONTINUED | OUTPATIENT
Start: 2019-09-13 | End: 2019-09-13

## 2019-09-13 RX ADMIN — FENTANYL CITRATE 12.5 MCG: 50 INJECTION INTRAMUSCULAR; INTRAVENOUS at 07:09

## 2019-09-13 RX ADMIN — CEFAZOLIN SODIUM 2 G: 2 SOLUTION INTRAVENOUS at 07:09

## 2019-09-13 RX ADMIN — HYDRALAZINE HYDROCHLORIDE 5 MG: 20 INJECTION INTRAMUSCULAR; INTRAVENOUS at 08:09

## 2019-09-13 RX ADMIN — SODIUM CHLORIDE, SODIUM LACTATE, POTASSIUM CHLORIDE, AND CALCIUM CHLORIDE: .6; .31; .03; .02 INJECTION, SOLUTION INTRAVENOUS at 06:09

## 2019-09-13 RX ADMIN — FENTANYL CITRATE 25 MCG: 50 INJECTION INTRAMUSCULAR; INTRAVENOUS at 07:09

## 2019-09-13 RX ADMIN — LABETALOL HYDROCHLORIDE 5 MG: 5 INJECTION, SOLUTION INTRAVENOUS at 07:09

## 2019-09-13 NOTE — BRIEF OP NOTE
[facility]  Brief Operative Note     SUMMARY     Surgery Date: 9/13/2019     Surgeon(s) and Role:     * Maximino Martell MD - Primary    Assisting Surgeon: None    Pre-op Diagnosis:  Basal cell carcinoma of overlapping sites of skin [C44.81]    Post-op Diagnosis:  Post-Op Diagnosis Codes:     * Basal cell carcinoma of overlapping sites of skin [C44.81]    Procedure(s) (LRB):  EXCISION MALIGNANT LESION RIGHT NOSE (Right)  POSSIBLE CLOSURE WITH FULL THICKNESS SKIN GRAFT (Right)    Anesthesia: Monitor Anesthesia Care    Description of the findings of the procedure: Excision BCC    Findings/Key Components: BCC    Estimated Blood Loss: 10cc         Specimens:   Specimen (12h ago, onward)    Start     Ordered    09/13/19 0819  Specimen to Pathology - Surgery  Once     Comments:  Pre-op Diagnosis: Basal cell carcinoma of overlapping sites of skin [C44.81]Procedure(s):EXCISION MALIGNANT LESION RIGHT NOSEPOSSIBLE CLOSURE WITH FULL THICKNESS SKIN GRAFT Number of specimens: 1Name of specimens: Ulcer to Right Nose. Stitch at 12:00 - 12-6     Start Status     09/13/19 0819 Needs to be Collected Order ID: [id number]       09/13/19 0819    09/13/19 0724  Specimen to Pathology - Surgery  Once     Comments:  Frozen - Ulcer Right Nose     Start Status     09/13/19 0724 Collected (09/13/19 0725) Order ID: [id number]       09/13/19 0723          Discharge Note    SUMMARY     Admit Date: 9/13/2019    Discharge Date and Time:  09/13/2019 8:26 AM    Hospital Course (synopsis of major diagnoses, care, treatment, and services provided during the course of the hospital stay):      Final Diagnosis: Post-Op Diagnosis Codes:     * Basal cell carcinoma of overlapping sites of skin [C44.81]    Disposition: Home or Self Care    Follow Up/Patient Instructions:     Medications:  Reconciled Home Medications:      Medication List      ASK your doctor about these medications    aspirin 81 MG EC tablet  Commonly known as:  ECOTRIN  Take  1 tablet by mouth every evening. Pt. Take orange flavored chewable tablet.     atorvastatin 40 MG tablet  Commonly known as:  LIPITOR  TAKE 1 TABLET BY MOUTH EVERY EVENING     losartan 50 MG tablet  Commonly known as:  COZAAR  TAKE 1 TABLET(50 MG) BY MOUTH EVERY DAY     OMEGA 3 ORAL  Take 550 mg by mouth. 1 Capsule Oral Every day     omeprazole 20 MG capsule  Commonly known as:  PRILOSEC  TAKE 1 CAPSULE BY MOUTH TWICE DAILY     OS-WENDY 500 + D3 ORAL  Take 1 tablet by mouth every evening.     OSTEO BI-FLEX ORAL  Take 1 tablet by mouth every evening.     TYLENOL ORAL  Take 1 tablet by mouth as needed.     vitamin E 200 UNIT capsule  Take 200 Units by mouth every evening.     XARELTO 20 mg Tab  Generic drug:  rivaroxaban  TAKE 1 TABLET(20 MG) BY MOUTH EVERY DAY          No discharge procedures on file.

## 2019-09-13 NOTE — DISCHARGE SUMMARY
The patient was admitted on 09/13/2019 and discharged on the same day after   excision of basal cell carcinoma, right nose.  She will continue with previous   medication.  Okay to start the blood thinner, Eliquis tomorrow, do not touch the   dressing and keep it dry and clean.  Resume preoperative diet and medication.    We are going to follow her in 1 week.      KENYATTA  dd: 09/13/2019 08:24:58 (CDT)  td: 09/13/2019 11:21:56 (CDT)  Doc ID   #[id number]  Job ID #[id number]    CC:

## 2019-09-13 NOTE — DISCHARGE INSTRUCTIONS
DIET: You may resume your home diet. If nausea is present, increase your diet gradually with fluids and bland foods    ACTIVITY LEVEL: You have received sedation or an anesthetic, you may feel sleepy for several hours. Rest until you are more awake. Gradually resume your normal activities    Medications: Pain medication should be taken only if needed and as directed. If antibiotics are prescribed, the medication should be taken until completed. You will be given an updated list of you medications.    No driving, alcoholic beverages or signing legal documents for next 24 hours or while taking pain medication.       CALL THE DOCTOR:    For any obvious bleeding (some dried blood over the incision is normal).      Redness, swelling, foul smell around incision or fever over 101.   Shortness of breath, Coughing up Bloody sputum, Pains or Swelling in your Calves .   Persistent pain or nausea not relieved by medication.    If any unusual problems or difficulties occur contact your doctor. If you cannot contact your doctor but feel your signs and symptoms warrant a physicians attention return to the emergency room.    DO NOT RESUME THE XARELTO UNTIL TOMORROW     DO NOT REMOVE YOUR DRESSING , KEEP IT CLEAN AND DRY UNTIL CLINIC VISIT     INSERT /PLACE THE BACTROBAN ANTIBIOTIC OINTMENT  IN BOTH NARES TWICE A DAY FOR THE NEXT 5 DAYS     Fall Prevention  Millions of people fall every year and injure themselves. You may have had anesthesia or sedation which may increase your risk of falling. You may have health issues that put you at an increased risk of falling.     Here are ways to reduce your risk of falling.  ·   · Make your home safe by keeping walkways clear of objects you may trip over.  · Use non-slip pads under rugs. Do not use area rugs or small throw rugs.  · Use non-slip mats in bathtubs and showers.  · Install handrails and lights on staircases.  · Do not walk in poorly lit areas.  · Do not stand on chairs or  wobbly ladders.  · Use caution when reaching overhead or looking upward. This position can cause a loss of balance.  · Be sure your shoes fit properly, have non-slip bottoms and are in good condition.   · Wear shoes both inside and out. Avoid going barefoot or wearing slippers.  · Be cautious when going up and down stairs, curbs, and when walking on uneven sidewalks.  · If your balance is poor, consider using a cane or walker.  · If your fall was related to alcohol use, stop or limit alcohol intake.   · If your fall was related to use of sleeping medicines, talk to your doctor about this. You may need to reduce your dosage at bedtime if you awaken during the night to go to the bathroom.    · To reduce the need for nighttime bathroom trips:  ¨ Avoid drinking fluids for several hours before going to bed  ¨ Empty your bladder before going to bed  ¨ Men can keep a urinal at the bedside  · Stay as active as you can. Balance, flexibility, strength, and endurance all come from exercise. They all play a role in preventing falls. Ask your healthcare provider which types of activity are right for you.  · Get your vision checked on a regular basis.  · If you have pets, know where they are before you stand up or walk so you don't trip over them.  · Use night lights.

## 2019-09-13 NOTE — TRANSFER OF CARE
"Anesthesia Transfer of Care Note    Patient: Patito BARRY Sample    Procedure(s) Performed: Procedure(s) (LRB):  EXCISION MALIGNANT LESION RIGHT NOSE (Right)  POSSIBLE CLOSURE WITH FULL THICKNESS SKIN GRAFT (Right)    Patient location: [facility]    Anesthesia Type: MAC    Transport from OR: Transported from OR on room air with adequate spontaneous ventilation    Post pain: adequate analgesia    Post assessment: no apparent anesthetic complications and tolerated procedure well    Post vital signs: stable    Level of consciousness: awake, alert and oriented    Nausea/Vomiting: no nausea/vomiting    Complications: none    Transfer of care protocol was followed      Last vitals:   Visit Vitals  /75 (BP Location: Right arm, Patient Position: Lying)   Pulse 83   Temp 36.7 °C (98.1 °F) (Oral)   Resp 14   Ht 5' 6" (1.676 m)   Wt 78.1 kg (172 lb 2.9 oz)   SpO2 97%   Breastfeeding? No   BMI 27.79 kg/m²     "

## 2019-09-13 NOTE — ANESTHESIA PREPROCEDURE EVALUATION
09/13/2019  Patito Mcintyre is a 89 y.o., female.  Pre-operative evaluation for Procedure(s) (LRB):  EXCISION MALIGNANT LESION RIGHT NOSE (Right)  POSSIBLE CLOSURE WITH FULL THICKNESS SKIN GRAFT (Right)    NPO >8h    Patient Active Problem List   Diagnosis    Osteoporosis, postmenopausal    Essential tremor    Macular degeneration of left eye    Osteoarthritis    Varicose veins    History of polymyalgia rheumatica    Overweight (BMI 25.0-29.9)    Type 2 diabetes, controlled, with neuropathy    Post herpetic neuralgia    Chronic low back pain    Benign hypertension with chronic kidney disease, stage III    Hyperlipidemia    GERD (gastroesophageal reflux disease)    Postmenopausal atrophic vaginitis    Atherosclerosis of abdominal aorta    Stenosis of abdominal aorta    Mild carotid artery disease    Peripheral vascular disease    History of DVT (deep vein thrombosis)    Recurrent UTI    OAB (overactive bladder)    Left posterior capsular opacification    Secondary hyperparathyroidism of renal origin    Chronic venous insufficiency    Ambulates with cane    Basal cell carcinoma of overlapping sites of skin       Review of patient's allergies indicates:   Allergen Reactions    Inderal [propranolol]     Indomethacin sodium      Other reaction(s): Vomiting  Other reaction(s): Nausea       No current facility-administered medications on file prior to encounter.      Current Outpatient Medications on File Prior to Encounter   Medication Sig Dispense Refill    ACETAMINOPHEN (TYLENOL ORAL) Take 1 tablet by mouth as needed.      aspirin (ECOTRIN) 81 MG EC tablet Take 1 tablet by mouth every evening. Pt. Take orange flavored chewable tablet.      atorvastatin (LIPITOR) 40 MG tablet TAKE 1 TABLET BY MOUTH EVERY EVENING 90 tablet 3    CALCIUM CARBONATE/VITAMIN D3 (OS-WENDY 500 + D3 ORAL) Take  1 tablet by mouth every evening.       GLUCOSAMINE HCL/CHONDR LEARY A NA (OSTEO BI-FLEX ORAL) Take 1 tablet by mouth every evening.       losartan (COZAAR) 50 MG tablet TAKE 1 TABLET(50 MG) BY MOUTH EVERY DAY (Patient taking differently: TAKE 1 TABLET(50 MG) BY MOUTH EVERY DAY  IN EVENING) 90 tablet 0    OMEGA-3S/DHA/EPA/FISH OIL (OMEGA 3 ORAL) Take 550 mg by mouth. 1 Capsule Oral Every day      omeprazole (PRILOSEC) 20 MG capsule TAKE 1 CAPSULE BY MOUTH TWICE DAILY (Patient taking differently: TAKES DAILY AT NIGHT) 180 capsule 0    vitamin E 200 UNIT capsule Take 200 Units by mouth every evening.       XARELTO 20 mg Tab TAKE 1 TABLET(20 MG) BY MOUTH EVERY DAY (Patient taking differently: TAKE 1 TABLET(20 MG) BY MOUTH EVERY DAY  AT NIGHT) 90 tablet 3       Past Surgical History:   Procedure Laterality Date    bilateral cataract surgery      bilateral leg vein dilation      CATARACT EXTRACTION      ou    EYE SURGERY      CAT EXT  OU    JOINT REPLACEMENT      RIGHT    left knee arthroscopy      left macular degeneration eye surgery      right knee surgery      SKIN CANCER EXCISION  2002, 2017    Basil Cell; face    TOTAL ABDOMINAL HYSTERECTOMY      YAG Laser Capsulotomy Left 03/23/2017    Dr. Faulkner       Social History     Socioeconomic History    Marital status:      Spouse name: Not on file    Number of children: Not on file    Years of education: Not on file    Highest education level: Not on file   Occupational History    Not on file   Social Needs    Financial resource strain: Not on file    Food insecurity:     Worry: Not on file     Inability: Not on file    Transportation needs:     Medical: Not on file     Non-medical: Not on file   Tobacco Use    Smoking status: Never Smoker    Smokeless tobacco: Never Used   Substance and Sexual Activity    Alcohol use: No    Drug use: No    Sexual activity: Never   Lifestyle    Physical activity:     Days per week: Not on file      Minutes per session: Not on file    Stress: Not at all   Relationships    Social connections:     Talks on phone: Not on file     Gets together: Not on file     Attends Shinto service: Not on file     Active member of club or organization: Not on file     Attends meetings of clubs or organizations: Not on file     Relationship status: Not on file   Other Topics Concern    Not on file   Social History Narrative    She lives by herself. She is originally from [place].         CBC: No results for input(s): WBC, RBC, HGB, HCT, PLT, MCV, MCH, MCHC in the last 72 hours.    CMP: No results for input(s): NA, K, CL, CO2, BUN, CREATININE, GLU, MG, PHOS, CALCIUM, ALBUMIN, PROT, ALKPHOS, ALT, AST, BILITOT in the last 72 hours.    INR  No results for input(s): PT, INR, PROTIME, APTT in the last 72 hours.        Diagnostic Studies:      EKD Echo:  Results for orders placed or performed during the hospital encounter of 16   2D Echo w/ Color Flow Doppler   Result Value Ref Range    QEF 55 55 - 65    Mitral Valve Regurgitation TRIVIAL TO MILD     Diastolic Dysfunction Yes (A)     Est. PA Systolic Pressure 43.05 (A)     Pericardial Effusion NONE     Mitral Valve Mobility NORMAL     Tricuspid Valve Regurgitation MILD        Anesthesia Evaluation    I have reviewed the Patient Summary Reports.    I have reviewed the Nursing Notes.   I have reviewed the Medications.     Review of Systems  Anesthesia Hx:  No problems with previous Anesthesia  History of prior surgery of interest to airway management or planning: Previous anesthesia: General Denies Family Hx of Anesthesia complications.   Denies Personal Hx of Anesthesia complications.   Social:  Non-Smoker    Hematology/Oncology:  Hematology Normal      Current/Recent Cancer. Other (see Oncology comments) surgery Oncology Comments: Skin CA nose     EENT/Dental:EENT/Dental Normal   Cardiovascular:   Exercise tolerance: poor Hypertension Hx DVT on Xarelto    Pulmonary:  Pulmonary Normal    Renal/:   Chronic Renal Disease, CRI    Hepatic/GI:   GERD, well controlled    Musculoskeletal:   Arthritis     Neurological:   Neuromuscular Disease, Essential tremor   Endocrine:   Diabetes, well controlled, type 2    Dermatological:  Skin Normal    Psych:  Psychiatric Normal           Physical Exam  General:  Well nourished    Airway/Jaw/Neck:  Airway Findings: Mouth Opening: Normal Tongue: Normal  General Airway Assessment: Adult  Mallampati: II  TM Distance: 4 - 6 cm        Eyes/Ears/Nose:  Eyes/Ears/Nose Findings: Neoplasm on nose    Dental:  Dental Findings: In tact   Chest/Lungs:  Chest/Lungs Clear    Heart/Vascular:  Heart Findings: Normal Heart murmur: negative       Mental Status:  Mental Status Findings: Normal        Anesthesia Plan  Type of Anesthesia, risks & benefits discussed:  Anesthesia Type:  MAC  Patient's Preference:   Intra-op Monitoring Plan: standard ASA monitors  Intra-op Monitoring Plan Comments:   Post Op Pain Control Plan: multimodal analgesia  Post Op Pain Control Plan Comments:   Induction:   IV  Beta Blocker:  Patient is not currently on a Beta-Blocker (No further documentation required).       Informed Consent: Patient understands risks and agrees with Anesthesia plan.  Questions answered. Anesthesia consent signed with patient.  ASA Score: 2     Day of Surgery Review of History & Physical:  There are no significant changes.          Ready For Surgery From Anesthesia Perspective.

## 2019-09-13 NOTE — INTERVAL H&P NOTE
The patient has been examined and the H&P has been reviewed:    I concur with the findings and no changes have occurred since H&P was written.9/5/19    Anesthesia/Surgery risks, benefits and alternative options discussed and understood by patient/family.          Active Hospital Problems    Diagnosis  POA    Basal cell carcinoma of overlapping sites of skin [C44.81]  Yes      Resolved Hospital Problems   No resolved problems to display.

## 2019-09-13 NOTE — ANESTHESIA POSTPROCEDURE EVALUATION
Anesthesia Post Evaluation    Patient: Patito Mcintyre    Procedure(s) Performed: Procedure(s) (LRB):  EXCISION MALIGNANT LESION RIGHT NOSE (Right)  POSSIBLE CLOSURE WITH FULL THICKNESS SKIN GRAFT (Right)    Final Anesthesia Type: MAC  Patient location during evaluation: PACU  Patient participation: Yes- Able to Participate  Level of consciousness: awake and alert  Post-procedure vital signs: reviewed and stable  Pain management: adequate  Airway patency: patent  PONV status at discharge: No PONV  Anesthetic complications: no      Cardiovascular status: blood pressure returned to baseline  Respiratory status: spontaneous ventilation  Hydration status: euvolemic  Follow-up not needed.          Vitals Value Taken Time   /76 9/13/2019 10:00 AM   Temp 36.8 °C (98.2 °F) 9/13/2019 10:00 AM   Pulse 80 9/13/2019 10:00 AM   Resp 16 9/13/2019 10:00 AM   SpO2 98 % 9/13/2019 10:00 AM         No case tracking events are documented in the log.      Pain/Balbir Score: Balbir Score: 10 (9/13/2019 10:00 AM)  Modified Balbir Score: 20 (9/13/2019 10:00 AM)

## 2019-09-23 ENCOUNTER — PATIENT OUTREACH (OUTPATIENT)
Dept: ADMINISTRATIVE | Facility: OTHER | Age: 84
End: 2019-09-23

## 2019-09-26 ENCOUNTER — OFFICE VISIT (OUTPATIENT)
Dept: VASCULAR SURGERY | Facility: CLINIC | Age: 84
End: 2019-09-26
Payer: MEDICARE

## 2019-09-26 ENCOUNTER — HOSPITAL ENCOUNTER (OUTPATIENT)
Dept: CARDIOLOGY | Facility: HOSPITAL | Age: 84
Discharge: HOME | End: 2019-09-26
Attending: SURGERY
Payer: MEDICARE

## 2019-09-26 VITALS
RESPIRATION RATE: 16 BRPM | SYSTOLIC BLOOD PRESSURE: 116 MMHG | WEIGHT: 172.38 LBS | DIASTOLIC BLOOD PRESSURE: 68 MMHG | BODY MASS INDEX: 27.83 KG/M2

## 2019-09-26 DIAGNOSIS — I65.23 CAROTID STENOSIS, ASYMPTOMATIC, BILATERAL: ICD-10-CM

## 2019-09-26 DIAGNOSIS — I65.23 CAROTID STENOSIS, ASYMPTOMATIC, BILATERAL: Primary | ICD-10-CM

## 2019-09-26 LAB
LEFT CBA DIAS: 3 CM/S
LEFT CBA SYS: 105 CM/S
LEFT CCA DIST DIAS: 6 CM/S
LEFT CCA DIST SYS: 104 CM/S
LEFT CCA MID DIAS: 8 CM/S
LEFT CCA MID SYS: 102 CM/S
LEFT CCA PROX DIAS: 0 CM/S
LEFT CCA PROX SYS: 109 CM/S
LEFT ECA DIAS: 0 CM/S
LEFT ECA SYS: 81 CM/S
LEFT ICA DIST DIAS: 13 CM/S
LEFT ICA DIST SYS: 80 CM/S
LEFT ICA MID DIAS: 8 CM/S
LEFT ICA MID SYS: 70 CM/S
LEFT ICA PROX DIAS: 5 CM/S
LEFT ICA PROX SYS: 59 CM/S
LEFT VERTEBRAL DIAS: 8 CM/S
LEFT VERTEBRAL SYS: 89 CM/S
OHS CV CAROTID RIGHT ICA EDV HIGHEST: 13
OHS CV CAROTID ULTRASOUND LEFT ICA/CCA RATIO: 0.73
OHS CV CAROTID ULTRASOUND RIGHT ICA/CCA RATIO: 0.83
OHS CV PV CAROTID LEFT HIGHEST CCA: 109
OHS CV PV CAROTID LEFT HIGHEST ICA: 80
OHS CV PV CAROTID RIGHT HIGHEST CCA: 89
OHS CV PV CAROTID RIGHT HIGHEST ICA: 74
OHS CV US CAROTID LEFT HIGHEST EDV: 13
RIGHT CBA DIAS: 0 CM/S
RIGHT CBA SYS: 55 CM/S
RIGHT CCA DIST DIAS: 8 CM/S
RIGHT CCA DIST SYS: 64 CM/S
RIGHT CCA MID DIAS: 5 CM/S
RIGHT CCA MID SYS: 61 CM/S
RIGHT CCA PROX DIAS: 6 CM/S
RIGHT CCA PROX SYS: 89 CM/S
RIGHT ECA DIAS: 6 CM/S
RIGHT ECA SYS: 89 CM/S
RIGHT ICA DIST DIAS: 13 CM/S
RIGHT ICA DIST SYS: 69 CM/S
RIGHT ICA MID DIAS: 11 CM/S
RIGHT ICA MID SYS: 70 CM/S
RIGHT ICA PROX DIAS: 11 CM/S
RIGHT ICA PROX SYS: 74 CM/S
RIGHT VERTEBRAL DIAS: 8 CM/S
RIGHT VERTEBRAL SYS: 70 CM/S

## 2019-09-26 PROCEDURE — 99214 OFFICE O/P EST MOD 30 MIN: CPT | Mod: S$GLB,,, | Performed by: SURGERY

## 2019-09-26 PROCEDURE — 99999 PR PBB SHADOW E&M-EST. PATIENT-LVL III: CPT | Mod: PBBFAC,,, | Performed by: SURGERY

## 2019-09-26 PROCEDURE — 99214 PR OFFICE/OUTPT VISIT, EST, LEVL IV, 30-39 MIN: ICD-10-PCS | Mod: S$GLB,,, | Performed by: SURGERY

## 2019-09-26 PROCEDURE — 93880 EXTRACRANIAL BILAT STUDY: CPT | Mod: 50

## 2019-09-26 PROCEDURE — 93880 EXTRACRANIAL BILAT STUDY: CPT | Mod: 26,,, | Performed by: SURGERY

## 2019-09-26 PROCEDURE — 93880 CV US DOPPLER CAROTID (CUPID ONLY): ICD-10-PCS | Mod: 26,,, | Performed by: SURGERY

## 2019-09-26 PROCEDURE — 1101F PR PT FALLS ASSESS DOC 0-1 FALLS W/OUT INJ PAST YR: ICD-10-PCS | Mod: CPTII,S$GLB,, | Performed by: SURGERY

## 2019-09-26 PROCEDURE — 1101F PT FALLS ASSESS-DOCD LE1/YR: CPT | Mod: CPTII,S$GLB,, | Performed by: SURGERY

## 2019-09-26 PROCEDURE — 99999 PR PBB SHADOW E&M-EST. PATIENT-LVL III: ICD-10-PCS | Mod: PBBFAC,,, | Performed by: SURGERY

## 2019-09-26 NOTE — LETTER
September 26, 2019        Margaret Stern MD  [address] [facility]  [place] [address]             [facility]  [address]  [facility] [place] [address]  Phone: [phone]  Fax: [phone]   Patient: Patito Mcintyre   MR Number: [id number]   YOB: 1925   Date of Visit: 9/26/2019       Dear Dr. Stern:    Thank you for referring Patito Mcintyre to me for evaluation. Below are the relevant portions of my assessment and plan of care.            If you have questions, please do not hesitate to call me. I look forward to following Patito along with you.    Sincerely,      Demond Miller MD           CC  No Recipients

## 2019-09-26 NOTE — PROGRESS NOTES
Demond Miller MD [facility]                         09/26/2019    HPI:  Patito Mcintyre is a 89 y.o. female with   Patient Active Problem List   Diagnosis    Osteoporosis, postmenopausal    Essential tremor    Macular degeneration of left eye    Osteoarthritis    Varicose veins    History of polymyalgia rheumatica    Overweight (BMI 25.0-29.9)    Type 2 diabetes, controlled, with neuropathy    Post herpetic neuralgia    Chronic low back pain    Benign hypertension with chronic kidney disease, stage III    Hyperlipidemia    GERD (gastroesophageal reflux disease)    Postmenopausal atrophic vaginitis    Atherosclerosis of abdominal aorta    Stenosis of abdominal aorta    Mild carotid artery disease    Peripheral vascular disease    History of DVT (deep vein thrombosis)    Recurrent UTI    OAB (overactive bladder)    Left posterior capsular opacification    Secondary hyperparathyroidism of renal origin    Chronic venous insufficiency    Ambulates with cane    Basal cell carcinoma of overlapping sites of skin    being managed by PCP and specialists who is here today for evaluation of LLE DVT, KAREEM and SMA/L renal artery atherosclerosis.  Pt has had 3 DVT of LLE.  S/p BLE saphenous stripping 50 yrs ago.  Patient states location is LLE occurring for several years.  Associated signs and symptoms include edema, varicose veins.  Symptoms began after first born child.  Alleviating factors include elevation.  Worsening factors include dependency.  No wounds to BLE.  No TIA or CVA. Functional status unlimited.  No symptoms of chronic mesenteric ischemia.    no MI  no Stroke  Tobacco use: no    3/2019: c/o L knee pain, intermittent at night.  Denies BLE edema.  No heaviness in BLE.  +compression although stockings 10 yrs old.  No limitation to functional activity, enjoying hobbies without issues.  Not compliant with low Na diet.  No excessive water  intake.  +elevation.      6/2019:  No new issues.  S/p L knee injection without improvement.    Interval history:  No complaints BLE today.  Had BCC of R nose removed recently, scheduled for repeat excision soon.  No new neuro symptoms.    Past Medical History:   Diagnosis Date    Atherosclerosis of abdominal aorta     noted on CT scan 10/27/2015    Back pain     BPPV (benign paroxysmal positional vertigo)     Cancer     skin cancer    Chronic kidney disease, stage III (moderate)     Dependent edema     Diabetes with neurologic complications     DVT (deep venous thrombosis)     Essential tremor     Hearing loss     with hearing aides 1/2014    History of shingles 2017    Hyperlipidemia LDL goal <100     Hypertension     Left posterior capsular opacification 3/23/2017    Macular degeneration of left eye     Macular hole     od    Mild carotid artery disease     Mild hyperparathyroidism     Osteoarthritis     Osteoporosis, postmenopausal     Overweight(278.02)     Peripheral vascular disease     Polymyalgia rheumatica     followed by rheumatology, Dr. Mendez    Stenosis of abdominal aorta     noted on CT scan 10/27/2015; probable high-grade stenosis at the origin of the SMA and left renal arteries    Type II or unspecified type diabetes mellitus without mention of complication, not stated as uncontrolled     Urinary incontinence     Varicose veins      Past Surgical History:   Procedure Laterality Date    bilateral cataract surgery      bilateral leg vein dilation      CATARACT EXTRACTION      ou    EYE SURGERY      CAT EXT  OU    JOINT REPLACEMENT      RIGHT    left knee arthroscopy      left macular degeneration eye surgery      right knee surgery      SKIN CANCER EXCISION  2002, 2017    Basil Cell; face    SKIN FULL THICKNESS GRAFT Right 9/13/2019    Procedure: POSSIBLE CLOSURE WITH FULL THICKNESS SKIN GRAFT;  Surgeon: Maximino Martell MD;  [facility];  Service: Plastics;   Laterality: Right;    SURGICAL REMOVAL OF LESION OF FACE Right 9/13/2019    Procedure: EXCISION MALIGNANT LESION RIGHT NOSE;  Surgeon: Maximino Martell MD;  [facility];  Service: Plastics;  Laterality: Right;  FROZEN SECTION  PRE-OP BY RN  9-    TOTAL ABDOMINAL HYSTERECTOMY      YAG Laser Capsulotomy Left 03/23/2017    Dr. Faulkner     Family History   Problem Relation Age of Onset    Hypertension Unknown     Edema Unknown     No Known Problems Sister     No Known Problems Mother     No Known Problems Father     No Known Problems Brother     No Known Problems Maternal Aunt     No Known Problems Maternal Uncle     No Known Problems Paternal Aunt     No Known Problems Paternal Uncle     No Known Problems Maternal Grandmother     No Known Problems Maternal Grandfather     No Known Problems Paternal Grandmother     No Known Problems Paternal Grandfather     Breast cancer Sister     Aneurysm Sister     No Known Problems Sister     Anuerysm Other     Amblyopia Neg Hx     Blindness Neg Hx     Cataracts Neg Hx     Diabetes Neg Hx     Glaucoma Neg Hx     Macular degeneration Neg Hx     Retinal detachment Neg Hx     Strabismus Neg Hx     Stroke Neg Hx     Thyroid disease Neg Hx     Cancer Neg Hx      Social History     Socioeconomic History    Marital status:      Spouse name: Not on file    Number of children: Not on file    Years of education: Not on file    Highest education level: Not on file   Occupational History    Not on file   Social Needs    Financial resource strain: Not on file    Food insecurity:     Worry: Not on file     Inability: Not on file    Transportation needs:     Medical: Not on file     Non-medical: Not on file   Tobacco Use    Smoking status: Never Smoker    Smokeless tobacco: Never Used   Substance and Sexual Activity    Alcohol use: No    Drug use: No    Sexual activity: Never   Lifestyle    Physical activity:     Days per week:  Not on file     Minutes per session: Not on file    Stress: Not at all   Relationships    Social connections:     Talks on phone: Not on file     Gets together: Not on file     Attends Mosque service: Not on file     Active member of club or organization: Not on file     Attends meetings of clubs or organizations: Not on file     Relationship status: Not on file   Other Topics Concern    Not on file   Social History Narrative    She lives by herself. She is originally from [place].       Current Outpatient Medications:     ACETAMINOPHEN (TYLENOL ORAL), Take 1 tablet by mouth as needed., Disp: , Rfl:     aspirin (ECOTRIN) 81 MG EC tablet, Take 1 tablet by mouth every evening. Pt. Take orange flavored chewable tablet., Disp: , Rfl:     atorvastatin (LIPITOR) 40 MG tablet, TAKE 1 TABLET BY MOUTH EVERY EVENING, Disp: 90 tablet, Rfl: 3    CALCIUM CARBONATE/VITAMIN D3 (OS-WENDY 500 + D3 ORAL), Take 1 tablet by mouth every evening. , Disp: , Rfl:     GLUCOSAMINE HCL/CHONDR LEARY A NA (OSTEO BI-FLEX ORAL), Take 1 tablet by mouth every evening. , Disp: , Rfl:     losartan (COZAAR) 50 MG tablet, TAKE 1 TABLET(50 MG) BY MOUTH EVERY DAY (Patient taking differently: TAKE 1 TABLET(50 MG) BY MOUTH EVERY DAY  IN EVENING), Disp: 90 tablet, Rfl: 0    OMEGA-3S/DHA/EPA/FISH OIL (OMEGA 3 ORAL), Take 550 mg by mouth. 1 Capsule Oral Every day, Disp: , Rfl:     omeprazole (PRILOSEC) 20 MG capsule, TAKE 1 CAPSULE BY MOUTH TWICE DAILY (Patient taking differently: TAKES DAILY AT NIGHT), Disp: 180 capsule, Rfl: 0    vitamin E 200 UNIT capsule, Take 200 Units by mouth every evening. , Disp: , Rfl:     XARELTO 20 mg Tab, TAKE 1 TABLET(20 MG) BY MOUTH EVERY DAY (Patient taking differently: TAKE 1 TABLET(20 MG) BY MOUTH EVERY DAY  AT NIGHT), Disp: 90 tablet, Rfl: 3    REVIEW OF SYSTEMS:  General: No fevers or chills; ENT: No sore throat; Allergy and Immunology: no persistent infections; Hematological and Lymphatic: No history of  bleeding or easy bruising; Endocrine: negative; Respiratory: no cough, shortness of breath, or wheezing; Cardiovascular: no chest pain or dyspnea on exertion; Gastrointestinal: no abdominal pain/back, change in bowel habits, or bloody stools; Genito-Urinary: no dysuria, trouble voiding, or hematuria; Musculoskeletal: negative; Neurological: no TIA or stroke symptoms; Psychiatric: no nervousness, anxiety or depression.    PHYSICAL EXAM:                General appearance:  Alert, well-appearing, and in no distress.  Oriented to person, place, and time                    Neurological: Normal speech, no focal findings noted; CN II - XII grossly intact. RLE with sensation to light touch, LLE with sensation to light touch.            Musculoskeletal: Digits/nail without cyanosis/clubbing.  Strength 5/5 BLE.                    Neck: Supple, no significant adenopathy                  Chest:  Clear to auscultation, no wheezes, rales or rhonchi, symmetric air entry. No use of accessory muscles               Cardiac: Normal rate and regular rhythm, S1 and S2 normal            Abdomen: Soft, nontender, nondistended, no masses or organomegaly, no hernia     No rebound tenderness noted; bowel sounds normal     No groin adenopathy      Extremities:   2+ R femoral pulse, 2+ L femoral pulse     1+ R popliteal pulse, 1+ L popliteal pulse     1+ R PT pulse, 1+ L PT pulse     1+ R DP pulse, 1+ L DP pulse     2+ RLE edema, 2+ LLE edema    Skin: RLE without ulcer; LLE without ulcer    CEAP 3/3 with large varicose veins bilaterally    LAB RESULTS:  No results found for: CBC  Lab Results   Component Value Date    LABPROT 10.8 08/06/2016    INR 1.0 08/06/2016     Lab Results   Component Value Date     09/09/2019    K 4.6 09/09/2019     09/09/2019    CO2 27 09/09/2019     09/09/2019    BUN 18 09/09/2019    CREATININE 0.9 09/09/2019    CALCIUM 9.6 09/09/2019    ANIONGAP 10 09/09/2019    EGFRNONAA 54.9 (A) 09/09/2019     Lab  Results   Component Value Date    WBC 4.19 09/09/2019    RBC 4.81 09/09/2019    HGB 15.1 09/09/2019    HCT 48.3 09/09/2019     (H) 09/09/2019    MCH 31.4 (H) 09/09/2019    MCHC 31.3 (L) 09/09/2019    RDW 13.1 09/09/2019     09/09/2019    MPV 10.0 09/09/2019    GRAN 2.4 09/09/2019    GRAN 57.7 09/09/2019    LYMPH 1.1 09/09/2019    LYMPH 25.8 09/09/2019    MONO 0.5 09/09/2019    MONO 11.5 09/09/2019    EOS 0.2 09/09/2019    BASO 0.02 09/09/2019    EOSINOPHIL 4.3 09/09/2019    BASOPHIL 0.5 09/09/2019    DIFFMETHOD Automated 09/09/2019     .  Lab Results   Component Value Date    HGBA1C 6.1 (H) 09/09/2019       IMAGING:  All pertinent imaging has been reviewed and interpreted independently.    LLE DVT femoral vein to PT vein    Carotid US <50% stenosis bilaterally    CT A/P non contrast with calcifications of SMA and L renal origin    6/20/19 VENOUS US: no RLE DVT or reflux.  No LLE DVT, L GSV reflux at SFJ and mid thigh.  Discontinuous distally, no SSV reflux bilat.    Carotid US 9/26/19: 0-19% stenosis bilateral ICA    IMP/PLAN:  89 y.o. female with   Patient Active Problem List   Diagnosis    Osteoporosis, postmenopausal    Essential tremor    Macular degeneration of left eye    Osteoarthritis    Varicose veins    History of polymyalgia rheumatica    Overweight (BMI 25.0-29.9)    Type 2 diabetes, controlled, with neuropathy    Post herpetic neuralgia    Chronic low back pain    Benign hypertension with chronic kidney disease, stage III    Hyperlipidemia    GERD (gastroesophageal reflux disease)    Postmenopausal atrophic vaginitis    Atherosclerosis of abdominal aorta    Stenosis of abdominal aorta    Mild carotid artery disease    Peripheral vascular disease    History of DVT (deep vein thrombosis)    Recurrent UTI    OAB (overactive bladder)    Left posterior capsular opacification    Secondary hyperparathyroidism of renal origin    Chronic venous insufficiency    Ambulates  with cane    Basal cell carcinoma of overlapping sites of skin    being managed by PCP and specialists who is here today for f/u evaluation of LLE DVT and CT findings with concern for SMA and L renal artery stenosis.    -LLE DVT unprovoked with h/o 3 DVT and treatment completed - agree with Hematology recs and anticoagulation; no iliac involvement or findings of phlegmasia on exam.  If symptoms worsen or leg becomes compromised, will repeat venous US   -Recommend compression with Rx stockings, elevation, dietary changes associated with water and sodium intake discussed at length with patient  -BLE venous reflux US with min L GSV reflux, asymptomatic - cont above  -Carotid stenosis prev being followed by Dr. Landin - will order repeat US 9/2020 for yearly routine surveillance and discuss coordination of care  -CT incidental findings of atherosclerotic disease of SMA and L renal artery origin without symptoms of CMI or LYN - no vascular surgical intervention warranted at this time.  No further workup indicated unless pt develops symptoms.  -L knee pain - cont Ortho recs  -Rec resuming Xarelto as soon as safely possible after cancer excision due to h/o unprovoked DVT - rec discussing holding anticoagulation vs bridging with Lovenox with Hematology and Cardiology  -RTC 1 yr    I spent 20 minutes evaluating this patient and greater than 50% of the time was spent counseling, coordinator care and discussing the plan of care.  All questions were answered and patient stated understanding with agreement with the above treatment plan.    Demond Miller MD [facility]  Vascular and Endovascular Surgery

## 2019-09-26 NOTE — PATIENT INSTRUCTIONS
Carotid Artery Problems: Blockage     A healthy carotid artery lets blood flow easily to the brain.   The blood carries oxygen and nutrients throughout the body. The carotid arteries are large blood vessels that carry blood to the brain. Certain health problems can make the inside of the carotid arteries narrow and rough. Over time, this damage increases the chances of having a stroke. A stroke is a sudden loss of brain function.   Open carotid arteries  In a healthy carotid artery, the inside of the artery is open. The lining of the artery wall is also smooth. This lets blood flow freely from the heart to the brain. The brain gets all the oxygen and nutrients it needs to function well.  Narrowed carotid arteries  Health factors, such as high blood pressure, high cholesterol, smoking, and diabetes, can damage artery walls and make them rough. This allows cholesterol and other particles in the blood to stick to the artery walls and form plaque or fatty deposits. As the plaque builds up, it can narrow the artery. Blood may also collect on the plaque and form blood clots.  How a stroke happens     Emboli can enter the bloodstream and travel to the brain. Brain tissue is damaged when emboli block arteries in the brain.   A stroke can happen when plaque in the carotid artery ruptures. This can allow small pieces of plaque to break off into the bloodstream. At the same time, rupture can make more blood clots. The pieces of plaque and tiny blood clots or emboli can flow in the blood until they get stuck in a small blood vessel in the brain. This blocks blood flow to part of the brain and causes a stroke.  Date Last Reviewed: 6/8/2015  © 0974-0331 [employer]. [address] [place] [address], [place] [address]. All rights reserved. This information is not intended as a substitute for professional medical care. Always follow your healthcare professional's instructions.          Putting on Compression Stockings      Turn the stocking inside-out, then fit it over your toes and heel.          Roll the stocking up your leg.            Once stockings are on, make sure the top of the stocking is about two fingers width below the crease of the knee (or the groin if you wear thigh-high stockings).          Use equipment, such as a stocking anais, or wear rubber gloves to make it easier to put on compression stockings.         Elastic compression stockings are prescribed to treat many vein problems. Wearing them may be the most important thing you do to manage your symptoms. The stockings fit tightly around your ankle, gradually reducing in pressure as they go up your legs. This helps keep blood flowing to your heart. As a result, swelling is reduced. Your healthcare provider will prescribe stockings at a safe pressure for you. He or she will also tell you how often to wear and remove the stockings. Follow these instructions closely. Also, do not buy or wear compression stockings without first seeing your healthcare provider.  Tips for wear and care  To wear stockings safely and to get the most benefit:  · Wear the length prescribed by your healthcare provider.  · Pull them to the designated height and no farther. Dont let them bunch at the top. This can restrict blood flow and increase swelling.  · Wear the stockings for the amount of time your healthcare provider recommends. Replace them when they start to feel loose. This will likely be every 3 to 6 months.  · Remove them as your healthcare provider directs. When removed, wash your legs. Then check your legs and feet for sores. Call your healthcare provider if you find a sore. Dont put the stockings back on unless your healthcare provider directs.   · Wash the stockings as instructed. They may need to be hand-washed.  Date Last Reviewed: 5/1/2016  © 1131-6840 [employer]. [address] [place] [address], [place] [address]. All rights reserved. This information is not  intended as a substitute for professional medical care. Always follow your healthcare professional's instructions.        Understanding Chronic Venous Insufficiency  Problems with the veins in the legs may lead to chronic venous insufficiency (CVI). CVI means that there is a long-term problem with the veins not being able to pump blood back to your heart. When this happens, blood stays in the legs and causes swelling and aching.   Two problems that may lead to chronic venous insufficiency are:  · Damaged valves. Valves keep blood flowing from the legs through the blood vessels and back to the heart. When the valves are damaged, blood does not flow as well.   · Deep vein thrombosis (DVT). Blood clots may form in the deep veins of the legs. This may cause pain, redness, and swelling in the legs. It may also block the flow of blood back to the heart. Seek immediate medical care if you have these symptoms.  · A blood clot in the leg can also break off and travel to the lungs. This is called pulmonary embolism (PE). In the lungs, the clot can cut off the flow of blood. This may cause chest pain, trouble breathing, sweating, a fast heartbeat, coughing (may cough up blood), and fainting. It is a medical emergency and may cause death. Call 911 if you have these symptoms.  · Healthcare providers call the two conditions, DVT and PE, venous thromboembolism (VTE).  CVI cant be cured, but you can control leg swelling to reduce the likelihood of ulcers (sores).  Recognizing the symptoms  Be aware of the following:  · If you stand or sit with your feet down for long periods, your legs may ache or feel heavy.  · Swollen ankles are possibly the most common symptom of CVI.  · As swelling increases, the skin over your ankles may show red spots or a brownish tinge. The skin may feel leathery or scaly, and may start to itch.  · If swelling is not controlled, an ulcer (open wound) may form.  What you can do  Reduce your risk of  developing ulcers by doing the following:  · Increase blood flow back to your heart by elevating your legs, exercising daily, and wearing elastic stockings.  · Boost blood flow in your legs by losing excess weight.  · If you must stand or sit in one place for a period of time, keep your blood moving by wiggling your toes, shifting your body position, and rising up on the balls of your feet.    Date Last Reviewed: 5/1/2016 © 2000-2017 [employer]. [address] [place] [address]. All rights reserved. This information is not intended as a substitute for professional medical care. Always follow your healthcare professional's instructions.        Tips for Using Less Salt    Most people with heart problems need to eat less salt (sodium). Reducing the amount of salt you eat may help control your blood pressure. The higher your blood pressure, the greater your risk for heart disease, stroke, blindness, and kidney problems.  At the store  · Make low-salt choices by reading labels carefully. Look for the total amount of sodium per serving.  · Use more fresh food. Buy more fruits and vegetables. Select lean meats, fish, and poultry.  · Use fewer frozen, canned, and packaged foods which often contain a lot of sodium.  · Use plain frozen vegetables without sauces or toppings. These products are often low- or no-sodium.  · Opt for reduced-sodium or no-salt-added versions of canned vegetables and soups.  In the kitchen  · Don't add salt to food when you're cooking. Season with flavorings such as onion, garlic, pepper, salt-free herbal blends, and lemon or lime juice.  · Use a cookbook containing low-salt recipes. It can give you ideas for tasty meals that are healthy for your heart.  · Sprinkle salt-free herbal blends on vegetables and meat.  · Drain and rinse canned foods, such as canned beans and vegetables, before cooking or eating.  Eating out  · Tell the  you're on a low-salt diet. Ask  questions about the menu.  · Order fish, chicken, and meat broiled, baked, poached, or grilled without salt, butter, or breading.  · Use lemon, pepper, and salt-free herb mixes to add flavor.  · Choose plain steamed rice, boiled noodles, and baked or boiled potatoes. Top potatoes with chives and a little sour cream.     Beware! Salt goes by many other names. Limit foods with these words listed as ingredients: salt, sodium, soy sauce, baking soda, baking powder, MSG, monosodium, Na (the chemical symbol for sodium). Some antacids are also high in salt.   Date Last Reviewed: 6/19/2015  © 7812-6955 [employer]. [address] [place] [address]. All rights reserved. This information is not intended as a substitute for professional medical care. Always follow your healthcare professional's instructions.        Low-Salt Diet  This diet removes foods that are high in salt. It also limits the amount of salt you use when cooking. It is most often used for people with high blood pressure, edema (fluid retention), and kidney, liver, or heart disease.  Table salt contains the mineral sodium. Your body needs sodium to work normally. But too much sodium can make your health problems worse. Your healthcare provider is recommending a low-salt (also called low-sodium) diet for you. Your total daily allowance of salt is 1,500 to 2,300 milligrams (mg). It is less than 1 teaspoon of table salt. This means you can have only about 500 to 700 mg of sodium at each meal. People with certain health problems should limit salt intake to the lower end of the recommended range.    When you cook, dont add much salt. If you can cook without using salt, even better. Dont add salt to your food at the table.  When shopping, read food labels. Salt is often called sodium on the label. Choose foods that are salt-free, low salt, or very low salt. Note that foods with reduced salt may not lower your salt intake enough.    Beans,  potatoes, and pasta  Ok: Dry beans, split peas, lentils, potatoes, rice, macaroni, pasta, spaghetti without added salt  Avoid: Potato chips, tortilla chips, and similar products  Breads and cereals  Ok: Low-sodium breads, rolls, cereals, and cakes; low-salt crackers, matzo crackers  Avoid: Salted crackers, pretzels, popcorn, Kinyarwanda toast, pancakes, muffins  Dairy  Ok: Milk, chocolate milk, hot chocolate mix, low-salt cheeses, and yogurt  Avoid: Processed cheese and cheese spreads; Roquefort, Camembert, and cottage cheese; buttermilk, instant breakfast drink  Desserts  Ok: Ice cream, frozen yogurt, juice bars, gelatin, cookies and pies, sugar, honey, jelly, hard candy  Avoid: Most pies, cakes and cookies prepared or processed with salt; instant pudding  Drinks  Ok: Tea, coffee, fizzy (carbonated) drinks, juices  Avoid: Flavored coffees, electrolyte replacement drinks, sports drinks  Meats  Ok: All fresh meat, fish, poultry, low-salt tuna, eggs, egg substitute  Avoid: Smoked, pickled, brine-cured, or salted meats and fish. This includes rojas, chipped beef, corned beef, hot dogs, deli meats, ham, kosher meats, salt pork, sausage, canned tuna, salted codfish, smoked salmon, herring, sardines, or anchovies.  Seasonings and spices  Ok: Most seasonings are okay. Good substitutes for salt include: fresh herb blends, hot sauce, lemon, garlic, hale, vinegar, dry mustard, parsley, cilantro, horseradish, tomato paste, regular margarine, mayonnaise, unsalted butter, cream cheese, vegetable oil, cream, low-salt salad dressing and gravy.  Avoid: Regular ketchup, relishes, pickles, soy sauce, teriyaki sauce, Worcestershire sauce, BBQ sauce, tartar sauce, meat tenderizer, chili sauce, regular gravy, regular salad dressing, salted butter  Soups  Ok: Low-salt soups and broths made with allowed foods  Avoid: Bouillon cubes, soups with smoked or salted meats, regular soup and broth  Vegetables  Ok: Most vegetables are okay; also  low-salt tomato and vegetable juices  Avoid: Sauerkraut and other brine-soaked vegetables; pickles and other pickled vegetables; tomato juice, olives  Date Last Reviewed: 8/1/2016 © 2000-2017 [employer]. [address] [place] [address], [place] [address]. All rights reserved. This information is not intended as a substitute for professional medical care. Always follow your healthcare professional's instructions.        Low-Salt Choices  Eating salt (sodium) can make your body retain too much water. Excess water makes your heart work harder. Canned, packaged, and frozen foods are easy to prepare, but they are often high in sodium. Here are some ideas for low-salt foods you can easily prepare yourself.    For breakfast  · Fruit or 100% fruit juice  · Whole-wheat bread or an English muffin. Compare sodium content on labels.  · Low-fat milk or yogurt  · Unsalted eggs  · Shredded wheat  · Corn tortillas  · Unsalted steamed rice  · Regular (not instant) hot cereal, made without salt  Stay away from:  · Sausage, rojas, and ham  · Flour tortillas  · Packaged muffins, pancakes, and biscuits  · Instant hot cereals  · Cottage cheese  For lunch and dinner  · Fresh fish, chicken, turkey, or meat--baked, broiled, or roasted without salt  · Dry beans, cooked without salt  · Tofu, stir-fried without salt  · Unsalted fresh fruit and vegetables, or frozen or canned fruit and vegetables with no added salt  Stay away from:  · Lunch or deli meat that is cured or smoked  · Cheese  · Tomato juice and catsup  · Canned vegetables, soups, and fish not labeled as no-salt-added or reduced sodium  · Packaged gravies and sauces  · Olives, pickles, and relish  · Bottled salad dressings  For snacks and desserts  · Yogurt  · Unsalted, air popped popcorn  · Unsalted nuts or seeds  Stay away from:  · Pies and cakes  · Packaged dessert mixes  · Pizza  · Canned and packaged puddings  · Pretzels, chips, crackers, and nuts--unless the label says  unsalted  Date Last Reviewed: 6/17/2015  © 1551-0074 [employer]. [address] [place] [address], [place] [address]. All rights reserved. This information is not intended as a substitute for professional medical care. Always follow your healthcare professional's instructions.

## 2019-10-10 RX ORDER — OMEPRAZOLE 20 MG/1
CAPSULE, DELAYED RELEASE ORAL
Qty: 180 CAPSULE | Refills: 0 | Status: SHIPPED | OUTPATIENT
Start: 2019-10-10 | End: 2020-03-31

## 2019-10-17 DIAGNOSIS — I10 ESSENTIAL HYPERTENSION, BENIGN: ICD-10-CM

## 2019-10-17 RX ORDER — LOSARTAN POTASSIUM 50 MG/1
TABLET ORAL
Qty: 90 TABLET | Refills: 0 | Status: SHIPPED | OUTPATIENT
Start: 2019-10-17 | End: 2019-12-30

## 2019-11-05 ENCOUNTER — TELEPHONE (OUTPATIENT)
Dept: FAMILY MEDICINE | Facility: CLINIC | Age: 84
End: 2019-11-05

## 2019-11-05 DIAGNOSIS — M81.0 OSTEOPOROSIS, POSTMENOPAUSAL: Primary | ICD-10-CM

## 2019-11-05 RX ORDER — ZOLEDRONIC ACID 5 MG/100ML
5 INJECTION, SOLUTION INTRAVENOUS ONCE
Qty: 100 ML | Refills: 0 | Status: SHIPPED | OUTPATIENT
Start: 2019-11-05 | End: 2019-11-05

## 2019-11-05 NOTE — TELEPHONE ENCOUNTER
----- Message from Margaret Stern MD sent at 9/11/2019  5:53 PM CDT -----  Due for Reclast in November.

## 2019-11-05 NOTE — TELEPHONE ENCOUNTER
Please call patient: I have placed an order for her Reclast infusion. She needs to call us if she does not get a notice to complete in the next 2 weeks.

## 2019-12-28 DIAGNOSIS — I10 ESSENTIAL HYPERTENSION, BENIGN: ICD-10-CM

## 2019-12-30 RX ORDER — LOSARTAN POTASSIUM 50 MG/1
TABLET ORAL
Qty: 90 TABLET | Refills: 0 | Status: SHIPPED | OUTPATIENT
Start: 2019-12-30 | End: 2020-03-27

## 2020-01-28 ENCOUNTER — TELEPHONE (OUTPATIENT)
Dept: CARDIOLOGY | Facility: CLINIC | Age: 85
End: 2020-01-28

## 2020-01-29 ENCOUNTER — OFFICE VISIT (OUTPATIENT)
Dept: CARDIOLOGY | Facility: CLINIC | Age: 85
End: 2020-01-29
Payer: MEDICARE

## 2020-01-29 VITALS
SYSTOLIC BLOOD PRESSURE: 128 MMHG | DIASTOLIC BLOOD PRESSURE: 76 MMHG | OXYGEN SATURATION: 95 % | HEIGHT: 66 IN | RESPIRATION RATE: 15 BRPM | BODY MASS INDEX: 28.6 KG/M2 | HEART RATE: 105 BPM | WEIGHT: 177.94 LBS

## 2020-01-29 DIAGNOSIS — E78.2 MIXED HYPERLIPIDEMIA: ICD-10-CM

## 2020-01-29 DIAGNOSIS — I73.9 PERIPHERAL VASCULAR DISEASE: Chronic | ICD-10-CM

## 2020-01-29 DIAGNOSIS — G25.0 ESSENTIAL TREMOR: Primary | ICD-10-CM

## 2020-01-29 DIAGNOSIS — I87.2 CHRONIC VENOUS INSUFFICIENCY: ICD-10-CM

## 2020-01-29 DIAGNOSIS — I77.9 MILD CAROTID ARTERY DISEASE: ICD-10-CM

## 2020-01-29 DIAGNOSIS — Z86.718 HISTORY OF DVT (DEEP VEIN THROMBOSIS): Chronic | ICD-10-CM

## 2020-01-29 PROCEDURE — 99999 PR PBB SHADOW E&M-EST. PATIENT-LVL III: CPT | Mod: PBBFAC,,, | Performed by: INTERNAL MEDICINE

## 2020-01-29 PROCEDURE — 1126F PR PAIN SEVERITY QUANTIFIED, NO PAIN PRESENT: ICD-10-PCS | Mod: S$GLB,,, | Performed by: INTERNAL MEDICINE

## 2020-01-29 PROCEDURE — 99214 OFFICE O/P EST MOD 30 MIN: CPT | Mod: S$GLB,,, | Performed by: INTERNAL MEDICINE

## 2020-01-29 PROCEDURE — 1159F MED LIST DOCD IN RCRD: CPT | Mod: S$GLB,,, | Performed by: INTERNAL MEDICINE

## 2020-01-29 PROCEDURE — 1101F PR PT FALLS ASSESS DOC 0-1 FALLS W/OUT INJ PAST YR: ICD-10-PCS | Mod: CPTII,S$GLB,, | Performed by: INTERNAL MEDICINE

## 2020-01-29 PROCEDURE — 93000 ELECTROCARDIOGRAM COMPLETE: CPT | Mod: S$GLB,,, | Performed by: INTERNAL MEDICINE

## 2020-01-29 PROCEDURE — 1101F PT FALLS ASSESS-DOCD LE1/YR: CPT | Mod: CPTII,S$GLB,, | Performed by: INTERNAL MEDICINE

## 2020-01-29 PROCEDURE — 1126F AMNT PAIN NOTED NONE PRSNT: CPT | Mod: S$GLB,,, | Performed by: INTERNAL MEDICINE

## 2020-01-29 PROCEDURE — 1159F PR MEDICATION LIST DOCUMENTED IN MEDICAL RECORD: ICD-10-PCS | Mod: S$GLB,,, | Performed by: INTERNAL MEDICINE

## 2020-01-29 PROCEDURE — 99214 PR OFFICE/OUTPT VISIT, EST, LEVL IV, 30-39 MIN: ICD-10-PCS | Mod: S$GLB,,, | Performed by: INTERNAL MEDICINE

## 2020-01-29 PROCEDURE — 99999 PR PBB SHADOW E&M-EST. PATIENT-LVL III: ICD-10-PCS | Mod: PBBFAC,,, | Performed by: INTERNAL MEDICINE

## 2020-01-29 PROCEDURE — 93000 EKG 12-LEAD: ICD-10-PCS | Mod: S$GLB,,, | Performed by: INTERNAL MEDICINE

## 2020-01-29 NOTE — PROGRESS NOTES
CARDIOVASCULAR PROGRESS NOTE    REASON FOR CONSULT:   Patito Mcintyre is a 89 y.o. female who presents for follow up of htn, hx recurrent DVT, PAD.    PCP: Leena Whiting: Greg Prietoc: Angela  HISTORY OF PRESENT ILLNESS:   Last seen February 2019.    The patient returns for follow-up, accompanied by her daughter.  The main reason for today's office visit is to discuss potentially changing her Xarelto to Coumadin.  The patient tells me the cost of the Xarelto at her last prescription was for 100 dollars for a 30 day supply.  She has apparently not yet met her deductible this year.  We discussed the pros and cons of continued Xarelto and its associated cost versus changing to Coumadin with the associated need for increased testing and dietary modification.  Given her history of recurrent DVT in the past, I do feel she requires long-term anticoagulation.  At present, the patient has decided to continue the Xarelto, and I have given her a written prescription so that she may shop around for a better price.  She otherwise denies angina, dyspnea, palpitations, lightheadedness, dizziness, or syncope.  There has been no PND, orthopnea, or lower extremity edema.  She denies melena, hematuria, or claudicant symptoms.    CARDIOVASCULAR HISTORY:   PAD: Probable high-grade stenosis at the origin of the SMA and left renal arteries. (CTA abd 10/27/15)  CVI  DVT LLE 8/2016, resolved on venous US 2/2017, recurrent 6/2018    PAST MEDICAL HISTORY:     Past Medical History:   Diagnosis Date    Atherosclerosis of abdominal aorta     noted on CT scan 10/27/2015    Back pain     BPPV (benign paroxysmal positional vertigo)     Cancer     skin cancer    Chronic kidney disease, stage III (moderate)     Dependent edema     Diabetes with neurologic complications     DVT (deep venous thrombosis)     Essential tremor     Hearing loss     with hearing aides 1/2014    History of shingles 2017    Hyperlipidemia LDL goal <100      Hypertension     Left posterior capsular opacification 3/23/2017    Macular degeneration of left eye     Macular hole     od    Mild carotid artery disease     Mild hyperparathyroidism     Osteoarthritis     Osteoporosis, postmenopausal     Overweight(278.02)     Peripheral vascular disease     Polymyalgia rheumatica     followed by rheumatology, Dr. Mendez    Stenosis of abdominal aorta     noted on CT scan 10/27/2015; probable high-grade stenosis at the origin of the SMA and left renal arteries    Type II or unspecified type diabetes mellitus without mention of complication, not stated as uncontrolled     Urinary incontinence     Varicose veins        PAST SURGICAL HISTORY:     Past Surgical History:   Procedure Laterality Date    bilateral cataract surgery      bilateral leg vein dilation      CATARACT EXTRACTION      ou    EYE SURGERY      CAT EXT  OU    JOINT REPLACEMENT      RIGHT    left knee arthroscopy      left macular degeneration eye surgery      right knee surgery      SKIN CANCER EXCISION  2002, 2017    Basil Cell; face    SKIN FULL THICKNESS GRAFT Right 9/13/2019    Procedure: POSSIBLE CLOSURE WITH FULL THICKNESS SKIN GRAFT;  Surgeon: Maximino Martell MD;  Location: [facility] OR;  Service: Plastics;  Laterality: Right;    SURGICAL REMOVAL OF LESION OF FACE Right 9/13/2019    Procedure: EXCISION MALIGNANT LESION RIGHT NOSE;  Surgeon: Maximino Martell MD;  Location: [facility] OR;  Service: Plastics;  Laterality: Right;  FROZEN SECTION  PRE-OP BY RN  9-    TOTAL ABDOMINAL HYSTERECTOMY      YAG Laser Capsulotomy Left 03/23/2017    Dr. Faulkner       ALLERGIES AND MEDICATION:     Review of patient's allergies indicates:   Allergen Reactions    Inderal [propranolol]     Indomethacin sodium      Other reaction(s): Vomiting  Other reaction(s): Nausea     Previous Medications    ACETAMINOPHEN (TYLENOL ORAL)    Take 1 tablet by mouth as needed.    ASPIRIN (ECOTRIN) 81 MG EC  TABLET    Take 1 tablet by mouth every evening. Pt. Take orange flavored chewable tablet.    ATORVASTATIN (LIPITOR) 40 MG TABLET    TAKE 1 TABLET BY MOUTH EVERY EVENING    CALCIUM CARBONATE/VITAMIN D3 (OS-WENDY 500 + D3 ORAL)    Take 1 tablet by mouth every evening.     GLUCOSAMINE HCL/CHONDR LEARY A NA (OSTEO BI-FLEX ORAL)    Take 1 tablet by mouth every evening.     LOSARTAN (COZAAR) 50 MG TABLET    TAKE 1 TABLET(50 MG) BY MOUTH EVERY DAY IN THE EVENING    OMEGA-3S/DHA/EPA/FISH OIL (OMEGA 3 ORAL)    Take 550 mg by mouth. 1 Capsule Oral Every day    OMEPRAZOLE (PRILOSEC) 20 MG CAPSULE    TAKES DAILY AT NIGHT    RIVAROXABAN (XARELTO) 20 MG TAB    TAKE 1 TABLET(20 MG) BY MOUTH EVERY DAY    VITAMIN E 200 UNIT CAPSULE    Take 200 Units by mouth every evening.        SOCIAL HISTORY:     Social History     Socioeconomic History    Marital status:      Spouse name: Not on file    Number of children: Not on file    Years of education: Not on file    Highest education level: Not on file   Occupational History    Not on file   Social Needs    Financial resource strain: Not on file    Food insecurity:     Worry: Not on file     Inability: Not on file    Transportation needs:     Medical: Not on file     Non-medical: Not on file   Tobacco Use    Smoking status: Never Smoker    Smokeless tobacco: Never Used   Substance and Sexual Activity    Alcohol use: No    Drug use: No    Sexual activity: Never   Lifestyle    Physical activity:     Days per week: Not on file     Minutes per session: Not on file    Stress: Not at all   Relationships    Social connections:     Talks on phone: Not on file     Gets together: Not on file     Attends [employer] service: Not on file     Active member of club or organization: Not on file     Attends meetings of clubs or organizations: Not on file     Relationship status: Not on file   Other Topics Concern    Not on file   Social History Narrative    She lives by herself. She is  originally from [place].       FAMILY HISTORY:     Family History   Problem Relation Age of Onset    Hypertension Unknown     Edema Unknown     No Known Problems Sister     No Known Problems Mother     No Known Problems Father     No Known Problems Brother     No Known Problems Maternal Aunt     No Known Problems Maternal Uncle     No Known Problems Paternal Aunt     No Known Problems Paternal Uncle     No Known Problems Maternal Grandmother     No Known Problems Maternal Grandfather     No Known Problems Paternal Grandmother     No Known Problems Paternal Grandfather     Breast cancer Sister     Aneurysm Sister     No Known Problems Sister     Anuerysm Other     Amblyopia Neg Hx     Blindness Neg Hx     Cataracts Neg Hx     Diabetes Neg Hx     Glaucoma Neg Hx     Macular degeneration Neg Hx     Retinal detachment Neg Hx     Strabismus Neg Hx     Stroke Neg Hx     Thyroid disease Neg Hx     Cancer Neg Hx        REVIEW OF SYSTEMS:   Review of Systems   Constitutional: Negative for chills, diaphoresis and fever.   Eyes: Negative for blurred vision, double vision and photophobia.   Respiratory: Negative for hemoptysis, shortness of breath and wheezing.    Cardiovascular: Negative for chest pain, palpitations, orthopnea, claudication, leg swelling and PND.   Gastrointestinal: Negative for abdominal pain, blood in stool, heartburn, melena, nausea and vomiting.   Genitourinary: Negative for flank pain and hematuria.   Musculoskeletal: Negative for falls, myalgias and neck pain.   Skin: Negative for rash.   Neurological: Positive for tremors. Negative for dizziness, seizures, loss of consciousness, weakness and headaches.   Endo/Heme/Allergies: Negative for polydipsia. Does not bruise/bleed easily.   Psychiatric/Behavioral: Negative for depression and memory loss. The patient is not nervous/anxious.        PHYSICAL EXAM:     Vitals:    01/29/20 1107   BP: 128/76   Pulse: 105   Resp: 15    Body  "mass index is 28.72 kg/m².  Weight: 80.7 kg (177 lb 14.6 oz)   Height: 5' 6" (167.6 cm)     Physical Exam   Constitutional: She is oriented to person, place, and time. She appears well-developed and well-nourished. She is cooperative.  Non-toxic appearance. No distress.   HENT:   Head: Normocephalic and atraumatic.   Eyes: Pupils are equal, round, and reactive to light. Conjunctivae and EOM are normal. No scleral icterus.   Neck: Trachea normal and normal range of motion. Neck supple. Normal carotid pulses and no JVD present. Carotid bruit is not present. No neck rigidity. No tracheal deviation and no edema present. No thyromegaly present.   Cardiovascular: Regular rhythm, S1 normal and S2 normal. Tachycardia present. PMI is not displaced. Exam reveals no gallop and no friction rub.   No murmur heard.  Pulses:       Carotid pulses are 2+ on the right side, and 2+ on the left side with bruit.  LLE venous varicosities   Pulmonary/Chest: Effort normal and breath sounds normal. No stridor. No respiratory distress. She has no wheezes. She has no rales. She exhibits no tenderness.   Abdominal: Soft. She exhibits no distension. There is no hepatosplenomegaly.   Musculoskeletal: She exhibits no edema or tenderness.   Feet:   Right Foot:   Skin Integrity: Negative for ulcer.   Left Foot:   Skin Integrity: Negative for ulcer.   Neurological: She is alert and oriented to person, place, and time. She displays tremor. No cranial nerve deficit.   tremor   Skin: Skin is warm and dry. No rash noted. No erythema.   Psychiatric: She has a normal mood and affect. Her speech is normal and behavior is normal.   Vitals reviewed.      DATA:   EKG: (personally reviewed tracing(s))  1/29/20 , low volt, similar to 2/9/19    Laboratory:  CBC:  Recent Labs   Lab 07/17/17  0845 06/24/18  1915 09/09/19  1004   WBC 5.70 7.00 4.19   Hemoglobin 15.8 14.6 15.1   Hematocrit 48.1 43.8 48.3   Platelets 211 149 L 178       CHEMISTRIES:  Recent " Labs   Lab 07/17/17  0845 06/24/18 1915 09/09/19  1004   Glucose 115 H 88 103   Sodium 140 139 138   Potassium 4.2 4.7 4.6   BUN, Bld 18 19 18   Creatinine 0.9 1.0 0.9   eGFR if African American >60.0 56 A >60.0   eGFR if non  55.7 A 49 A 54.9 A   Calcium 10.1 9.7 9.6       CARDIAC BIOMARKERS:        COAGS:        LIPIDS/LFTS:  Recent Labs   Lab 07/17/17  0845 06/24/18 1915 11/01/18 0752 09/09/19  1004   Cholesterol 124  --  132 122   Triglycerides 118  --  89 81   HDL 41  --  43 37 L   LDL Cholesterol 59.4 L  --  71.2 68.8   Non-HDL Cholesterol 83  --  89 85   AST 37 40  --  29   ALT 21 18  --  15       Cardiovascular Testing:  Carotid US 9/26/19  1. Right carotid ultrasound shows 0-19% internal carotid artery stenosis.  Antegrade vertebral artery flow.  2. Left carotid ultrasound shows  0-19% internal carotid artery stenosis.  Antegrade vertebral artery flow.    LE venous US/reflux 6/20/19  1. Right lower extremity venous ultrasound shows no greater or lesser saphenous vein reflux.  The greater saphenous vein is discontinuous distally.  There is no DVT.  2. Left lower extremity venous ultrasound shows greater saphenous vein reflux at the saphenofemoral junction and mid thigh.  The greater saphenous vein is discontinuous distally.  There is no DVT.    LLE venous US 6/24/18 (recurrent LLE DVT vs 2/2017 report)  Extensive left lower extremity acute deep venous thrombosis.    Echo 7/11/16:    1 - Normal left ventricular systolic function (EF 55-60%).  Basal inferior hypokinesis.     2 - Concentric remodeling.     3 - Left ventricular diastolic dysfunction.     4 - Trivial to mild mitral regurgitation.     5 - Mild tricuspid regurgitation.     6 - Pulmonary hypertension. The estimated PA systolic pressure is 43 mmHg.     CTA abdomen 10/27/15:  Distended contrast-filled bladder. No vesico-enteric fistula identified. Trace amount of contrast seen in the vaginal canal.  Extensive calcified  atherosclerotic disease with probable high-grade stenosis at the origin of the SMA and left renal arteries.  Significantly distended gas and fluid-filled stomach.    ASSESSMENT:   # DVT LLE 8/2016, resolved by US 2/2017, recurrent 6/2018.  Hypercoag w/u neg (see labs 8/2/18).  Follows with Dr. Miller with rec for compression rx.  Indef OAC recommended.  Pt will plan continued Xarelto rx.  # Peripheral vascular disease as evidence by the vascular stenoses of her SMA and L renal arteries. Fortunately, these do not seem to be causing and significant problems/symptoms at this juncture.  # Carotid bruits, pt denies prior h/o CVA/TIA. Carotid US 9/2019 neg for significant stenosis.  # HTN, controlled  # HLP, on atorva 40mg  # Venous insufficiency s/p venous stripping, followed by Dr. Miller.    PLAN:   Cont med rx  Cont Xarelto to 20mg qd, indef rx planned.  Pt given wirtten rx so she can shop around for the best price.  Cont ASA 81mg qd for hx of PAD, can stop in the future if bleeding becomes an issue.  RTC 6 months.    Scotty Landin MD, FACC

## 2020-03-27 DIAGNOSIS — I10 ESSENTIAL HYPERTENSION, BENIGN: ICD-10-CM

## 2020-03-27 RX ORDER — ATORVASTATIN CALCIUM 40 MG/1
TABLET, FILM COATED ORAL
Qty: 90 TABLET | Refills: 3 | Status: SHIPPED | OUTPATIENT
Start: 2020-03-27 | End: 2021-04-06 | Stop reason: SDUPTHER

## 2020-03-27 RX ORDER — LOSARTAN POTASSIUM 50 MG/1
TABLET ORAL
Qty: 90 TABLET | Refills: 0 | Status: SHIPPED | OUTPATIENT
Start: 2020-03-27 | End: 2020-07-22

## 2020-03-31 RX ORDER — OMEPRAZOLE 20 MG/1
CAPSULE, DELAYED RELEASE ORAL
Qty: 180 CAPSULE | Refills: 0 | Status: SHIPPED | OUTPATIENT
Start: 2020-03-31 | End: 2020-09-23

## 2020-03-31 NOTE — PROGRESS NOTES
Refill Routing Note     Medication(s) are not appropriate for processing by [facility]:    Non Participating Provider in Refill Center     Appointments  past 12m or future 3m with PCP    Date Provider   Last Visit   7/29/2019 Margaret Stern MD   Next Visit    Margaret Stern MD           Automatic Epic Protocol Generated Data:    Requested Prescriptions   Pending Prescriptions Disp Refills    omeprazole (PRILOSEC) 20 MG capsule [Pharmacy Med Name: OMEPRAZOLE 20MG CAPSULES] 180 capsule 0     Sig: TAKE ONE CAPSULE BY MOUTH DAILY AT NIGHT       Gastroenterology: Proton Pump Inhibitors Failed - 3/30/2020 10:59 PM        Failed - Osteoporosis is not on problem list        Failed - Office visit in past 6 months or future 90 days.     Recent Outpatient Visits            2 months ago Essential tremor    [facility] - Cardiology Scotty Landin MD    6 months ago Carotid stenosis, asymptomatic, bilateral    [facility] - Vascular Surgery Demond Miller MD    7 months ago Eye exam, routine    [facility] - Optometry Yimi Colon OD    8 months ago Routine medical exam    [facility] - Family Medicine Margaret Stern MD    9 months ago Carotid stenosis, asymptomatic, bilateral    [facility] Demond [facility]                    Passed - Patient is at least 18 years old        Passed - Plavix is not on active medication list        Passed - GERD is on problem list        Powered by [employer] - 3/30/2020 10:59 PM        Unable to evaluate active med list or whether request is a duplicate.           Note composed:11:00 PM 03/30/2020

## 2020-06-01 ENCOUNTER — TELEPHONE (OUTPATIENT)
Dept: VASCULAR SURGERY | Facility: CLINIC | Age: 85
End: 2020-06-01

## 2020-06-01 ENCOUNTER — HOSPITAL ENCOUNTER (EMERGENCY)
Facility: HOSPITAL | Age: 85
Discharge: HOME | End: 2020-06-01
Attending: EMERGENCY MEDICINE
Payer: MEDICARE

## 2020-06-01 VITALS
SYSTOLIC BLOOD PRESSURE: 147 MMHG | WEIGHT: 170 LBS | RESPIRATION RATE: 20 BRPM | HEIGHT: 66 IN | DIASTOLIC BLOOD PRESSURE: 73 MMHG | TEMPERATURE: 98 F | BODY MASS INDEX: 27.32 KG/M2 | OXYGEN SATURATION: 97 % | HEART RATE: 88 BPM

## 2020-06-01 DIAGNOSIS — M25.569 KNEE PAIN: ICD-10-CM

## 2020-06-01 DIAGNOSIS — M71.21 POPLITEAL CYST, RIGHT: Primary | ICD-10-CM

## 2020-06-01 DIAGNOSIS — M25.579 ANKLE PAIN: ICD-10-CM

## 2020-06-01 LAB
ALBUMIN SERPL BCP-MCNC: 3.7 G/DL (ref 3.5–5.2)
ALP SERPL-CCNC: 72 U/L (ref 55–135)
ALT SERPL W/O P-5'-P-CCNC: 16 U/L (ref 10–44)
ANION GAP SERPL CALC-SCNC: 9 MMOL/L (ref 8–16)
AST SERPL-CCNC: 33 U/L (ref 10–40)
BASOPHILS # BLD AUTO: 0.03 K/UL (ref 0–0.2)
BASOPHILS NFR BLD: 0.5 % (ref 0–1.9)
BILIRUB SERPL-MCNC: 1.2 MG/DL (ref 0.1–1)
BUN SERPL-MCNC: 15 MG/DL (ref 10–30)
CALCIUM SERPL-MCNC: 9.6 MG/DL (ref 8.7–10.5)
CHLORIDE SERPL-SCNC: 104 MMOL/L (ref 95–110)
CO2 SERPL-SCNC: 24 MMOL/L (ref 23–29)
CREAT SERPL-MCNC: 0.9 MG/DL (ref 0.5–1.4)
DIFFERENTIAL METHOD: NORMAL
EOSINOPHIL # BLD AUTO: 0.2 K/UL (ref 0–0.5)
EOSINOPHIL NFR BLD: 3.3 % (ref 0–8)
ERYTHROCYTE [DISTWIDTH] IN BLOOD BY AUTOMATED COUNT: 13.1 % (ref 11.5–14.5)
EST. GFR  (AFRICAN AMERICAN): >60 ML/MIN/1.73 M^2
EST. GFR  (NON AFRICAN AMERICAN): 55 ML/MIN/1.73 M^2
GLUCOSE SERPL-MCNC: 116 MG/DL (ref 70–110)
HCT VFR BLD AUTO: 46.5 % (ref 37–48.5)
HGB BLD-MCNC: 15 G/DL (ref 12–16)
IMM GRANULOCYTES # BLD AUTO: 0.01 K/UL (ref 0–0.04)
IMM GRANULOCYTES NFR BLD AUTO: 0.2 % (ref 0–0.5)
LYMPHOCYTES # BLD AUTO: 1.2 K/UL (ref 1–4.8)
LYMPHOCYTES NFR BLD: 22.7 % (ref 18–48)
MCH RBC QN AUTO: 31 PG (ref 27–31)
MCHC RBC AUTO-ENTMCNC: 32.3 G/DL (ref 32–36)
MCV RBC AUTO: 96 FL (ref 82–98)
MONOCYTES # BLD AUTO: 0.6 K/UL (ref 0.3–1)
MONOCYTES NFR BLD: 10.4 % (ref 4–15)
NEUTROPHILS # BLD AUTO: 3.4 K/UL (ref 1.8–7.7)
NEUTROPHILS NFR BLD: 62.9 % (ref 38–73)
NRBC BLD-RTO: 0 /100 WBC
PLATELET # BLD AUTO: 165 K/UL (ref 150–350)
PMV BLD AUTO: 9.9 FL (ref 9.2–12.9)
POTASSIUM SERPL-SCNC: 4.6 MMOL/L (ref 3.5–5.1)
PROT SERPL-MCNC: 7.5 G/DL (ref 6–8.4)
RBC # BLD AUTO: 4.84 M/UL (ref 4–5.4)
SODIUM SERPL-SCNC: 137 MMOL/L (ref 136–145)
WBC # BLD AUTO: 5.46 K/UL (ref 3.9–12.7)

## 2020-06-01 PROCEDURE — 85025 COMPLETE CBC W/AUTO DIFF WBC: CPT

## 2020-06-01 PROCEDURE — 99285 EMERGENCY DEPT VISIT HI MDM: CPT | Mod: 25

## 2020-06-01 PROCEDURE — 25000003 PHARM REV CODE 250: Performed by: EMERGENCY MEDICINE

## 2020-06-01 PROCEDURE — 80053 COMPREHEN METABOLIC PANEL: CPT

## 2020-06-01 RX ORDER — ACETAMINOPHEN 500 MG
1000 TABLET ORAL
Status: COMPLETED | OUTPATIENT
Start: 2020-06-01 | End: 2020-06-01

## 2020-06-01 RX ADMIN — ACETAMINOPHEN 1000 MG: 500 TABLET ORAL at 11:06

## 2020-06-01 NOTE — ED NOTES
Spoke with patients son (Sanchez) reviewed current medications, allergies, PMH, Immunizations. Patient notified of testing, x-ray, ultrasound.

## 2020-06-01 NOTE — TELEPHONE ENCOUNTER
Call to son who stated he is concerned that she has a blood clot behind her knee on the right side. He stated her blood clots have been on the left side. He stated she is still on her blood thinner. He stated she is having problems walking and it is very painful. Explained that he should bring her to the ER to have it evaluated. He stated he was not sure if he could get her to the car, explained then he could call 911. He stated understanding.

## 2020-06-01 NOTE — TELEPHONE ENCOUNTER
----- Message from Madelin Zamorano sent at 6/1/2020  8:07 AM CDT -----  Type: Patient Call Back    Who called: Meng - Son     What is the request in detail: patient's son would like to speak with the doctor about a blood behind her right knee it's swollen and extremely painful. Please call to advise     Can the clinic reply by [facility]? No   Would the patient rather a call back or a response via [facility]?  Call     Best call back number: [phone]

## 2020-06-01 NOTE — ED TRIAGE NOTES
Patient c/o right knee pain 8/10, started Wednesday with ankle pain and swelling, radiating to right knee on Friday, worsening today. Reports taking tylenol this morning approx at 2am. Hx of blood clots, taking xarleto last dose take last night.     Denies trauma, chest pain, sob, fever, cough, headache, blurred vision, N/V/D    AAO x 4.

## 2020-06-01 NOTE — ED PROVIDER NOTES
Encounter Date: 6/1/2020    SCRIBE #1 NOTE: I, Latesha Angeles, am scribing for, and in the presence of,  Breanne Tobar MD. I have scribed the following portions of the note - Other sections scribed: HPI, ROS, PE.       History     Chief Complaint   Patient presents with    Knee Pain     Patient reports right knee pain with lump behind knee. States pain started on Saturday, was able to ambulate about 100 yards on yesterday with no problems, but pain worsened on last night. Now unable to put weight on right leg. Reports taken Tylenol for the pain, last taken at approx 0200 today.      HPI: This 89 y.o female, with a medical history of atherosclerosis of abdominal aorta, cancer, stage III chronic kidney disease, diabetes with neurologic complications, deep venous thrombosis, essential tremor, hyperlipidemia, hypertension, mild carotid artery disease, osteoarthritis, peripheral vascular disease, and varicose veins, presents to the ED c/o pain and swelling to the right knee. Pt reports that she initially began to experience swelling to the right ankle, however, she states that by last Friday she began to experience pain to the posterior right knee. She states that she has since been experiencing pain and swelling to the right knee as well as to the right ankle. Pt adds that she has been unable to bear weight on the right leg due to pain. The symptoms are acute, constant and moderate (6/10). She notes use of Tylenol for treatment (last taken at 2:00 am this morning) without any relief. Pt reports that she is concerned about the symptoms as she has a history of blood clots. She notes that she is currently on Xarelto for treatment. No recent falls or injuries. Pt denies fever, chills, nausea, emesis, chest pain, dizziness or any other associated symptoms.    The history is provided by the patient.     Review of patient's allergies indicates:   Allergen Reactions    Inderal [propranolol]     Indomethacin sodium       Other reaction(s): Vomiting  Other reaction(s): Nausea     Past Medical History:   Diagnosis Date    Atherosclerosis of abdominal aorta     noted on CT scan 10/27/2015    Back pain     BPPV (benign paroxysmal positional vertigo)     Cancer     skin cancer    Chronic kidney disease, stage III (moderate)     Dependent edema     Diabetes with neurologic complications     DVT (deep venous thrombosis)     Essential tremor     Hearing loss     with hearing aides 1/2014    History of shingles 2017    Hyperlipidemia LDL goal <100     Hypertension     Left posterior capsular opacification 3/23/2017    Macular degeneration of left eye     Macular hole     od    Mild carotid artery disease     Mild hyperparathyroidism     Osteoarthritis     Osteoporosis, postmenopausal     Overweight(278.02)     Peripheral vascular disease     Polymyalgia rheumatica     followed by rheumatology, Dr. Mendez    Stenosis of abdominal aorta     noted on CT scan 10/27/2015; probable high-grade stenosis at the origin of the SMA and left renal arteries    Type II or unspecified type diabetes mellitus without mention of complication, not stated as uncontrolled     Urinary incontinence     Varicose veins      Past Surgical History:   Procedure Laterality Date    bilateral cataract surgery      bilateral leg vein dilation      CATARACT EXTRACTION      ou    EYE SURGERY      CAT EXT  OU    JOINT REPLACEMENT      RIGHT    left knee arthroscopy      left macular degeneration eye surgery      right knee surgery      SKIN CANCER EXCISION  2002, 2017    Basil Cell; face    SKIN FULL THICKNESS GRAFT Right 9/13/2019    Procedure: POSSIBLE CLOSURE WITH FULL THICKNESS SKIN GRAFT;  Surgeon: Maximino Martell MD;  Location: [facility] OR;  Service: Plastics;  Laterality: Right;    SURGICAL REMOVAL OF LESION OF FACE Right 9/13/2019    Procedure: EXCISION MALIGNANT LESION RIGHT NOSE;  Surgeon: Maximino Martell MD;  Location: [facility] OR;   Service: Plastics;  Laterality: Right;  FROZEN SECTION  PRE-OP BY RN  9-    TOTAL ABDOMINAL HYSTERECTOMY      YAG Laser Capsulotomy Left 03/23/2017    Dr. Faulkner     Family History   Problem Relation Age of Onset    Hypertension Unknown     Edema Unknown     No Known Problems Sister     No Known Problems Mother     No Known Problems Father     No Known Problems Brother     No Known Problems Maternal Aunt     No Known Problems Maternal Uncle     No Known Problems Paternal Aunt     No Known Problems Paternal Uncle     No Known Problems Maternal Grandmother     No Known Problems Maternal Grandfather     No Known Problems Paternal Grandmother     No Known Problems Paternal Grandfather     Breast cancer Sister     Aneurysm Sister     No Known Problems Sister     Anuerysm Other     Amblyopia Neg Hx     Blindness Neg Hx     Cataracts Neg Hx     Diabetes Neg Hx     Glaucoma Neg Hx     Macular degeneration Neg Hx     Retinal detachment Neg Hx     Strabismus Neg Hx     Stroke Neg Hx     Thyroid disease Neg Hx     Cancer Neg Hx      Social History     Tobacco Use    Smoking status: Never Smoker    Smokeless tobacco: Never Used   Substance Use Topics    Alcohol use: No    Drug use: No     Review of Systems   Constitutional: Negative for chills and fever.   HENT: Negative for congestion, rhinorrhea and sore throat.    Eyes: Negative for visual disturbance.   Respiratory: Negative for cough and shortness of breath.    Cardiovascular: Negative for chest pain.   Gastrointestinal: Negative for abdominal pain, diarrhea, nausea and vomiting.   Genitourinary: Negative for dysuria.   Musculoskeletal: Positive for arthralgias (right knee; right ankle) and joint swelling (right knee; right ankle). Negative for back pain.   Skin: Negative for rash.   Neurological: Negative for dizziness and weakness.       Physical Exam     Initial Vitals [06/01/20 0922]   BP Pulse Resp Temp SpO2   133/70 99 20  98.1 °F (36.7 °C) 97 %      MAP       --         Physical Exam    Nursing note and vitals reviewed.  Constitutional:   Patient appears as age stated. She is an elderly female.   HENT:   Head: Normocephalic.   Eyes: Conjunctivae are normal. No scleral icterus.   Neck: Normal range of motion. Neck supple.   Cardiovascular: Normal heart sounds and intact distal pulses.   No murmur heard.  Pulmonary/Chest: Breath sounds normal. No respiratory distress.   Abdominal: Soft. Bowel sounds are normal. She exhibits no distension.   Musculoskeletal: She exhibits tenderness. She exhibits no edema.   No edema noted. There is tenderness in the popliteal fossa.   Neurological: She is alert.   Skin: Skin is warm and dry.   There are numerous varicose veins present. No contusions, lacerations or abrasions.   Psychiatric: She has a normal mood and affect. Her behavior is normal. Judgment and thought content normal.         ED Course   Procedures  Labs Reviewed   COMPREHENSIVE METABOLIC PANEL - Abnormal; Notable for the following components:       Result Value    Glucose 116 (*)     Total Bilirubin 1.2 (*)     eGFR if non  55 (*)     All other components within normal limits   CBC W/ AUTO DIFFERENTIAL          Imaging Results          US Lower Extremity Veins Right (Final result)  Result time 06/01/20 11:04:27    Final result by Kilo Burnett MD (06/01/20 11:04:27)                 Impression:      No evidence of deep venous thrombosis in the right lower extremity.    Complex popliteal fossa cyst.      Electronically signed by: Kilo Burnett MD  Date:    06/01/2020  Time:    11:04             Narrative:    EXAMINATION:  US LOWER EXTREMITY VEINS RIGHT    CLINICAL HISTORY:  Pain in unspecified knee    TECHNIQUE:  Duplex and color flow Doppler evaluation and graded compression of the right lower extremity veins was performed.    COMPARISON:  06/24/2018    FINDINGS:  Duplex and color flow Doppler evaluation does not  reveal any evidence of acute venous thrombosis in the common femoral, superficial femoral, greater saphenous, popliteal, peroneal, anterior tibial and posterior tibial veins of the right lower extremity.  There is no reflux to suggest valvular incompetence.    There is a complex fluid collection within the right popliteal fossa measuring approximately 4.0 x 1.4 x 1.9 cm.                               X-Ray Knee 1 or 2 View Right (Final result)  Result time 06/01/20 10:50:09   Procedure changed from X-Ray Knee 3 View Right     Final result by Jennifer Abdi MD (06/01/20 10:50:09)                 Impression:      The source of the patient's knee pain is not established on this study.      Electronically signed by: Jennifer Abdi MD  Date:    06/01/2020  Time:    10:50             Narrative:    EXAMINATION:  XR KNEE 1 OR 2 VIEW RIGHT    CLINICAL HISTORY:  knee pain;  Pain in unspecified knee    TECHNIQUE:  AP and lateral views of the right knee were performed.    COMPARISON:  05/14/2009.    FINDINGS:  There is right total knee arthroplasty with posterior resurfacing of the patella.  Prosthetic components appear in satisfactory position and alignment.  Cross-table lateral view reveals fluid in the suprapatellar bursa without fluid-fluid level.  There is abundant calcific atherosclerosis in this 89-year-old woman.                               X-Ray Ankle Complete Right (Final result)  Result time 06/01/20 10:49:31    Final result by Bernardino Chun MD (06/01/20 10:49:31)                 Impression:      No evidence of fracture.      Electronically signed by: Bernardino Chun MD  Date:    06/01/2020  Time:    10:49             Narrative:    EXAMINATION:  XR ANKLE COMPLETE 3 VIEW RIGHT    CLINICAL HISTORY:  Pain in unspecified ankle and joints of unspecified foot    COMPARISON:  None.    FINDINGS:  The alignment is within normal limits.  No displaced fractures identified.  No evidence of lytic or blastic  lesions.Joint spaces are unremarkable.Soft tissues are unremarkable.Osseous calcaneal spurs.  Mild talar beaking.                                89-year-old female presents with posterior knee pain  Has a history of DVTs.  Is compliant with anticoagulation.  Also has a history of Baker cyst.  Differential includes pathologic fracture, Baker cyst, DVT, arthritis.  No trauma  Ultrasound shows cyst in the right popliteal fossa.  Tylenol for pain.  Patient's son was updated as well.  Referral for Orthopedics placed as well.  ALTAGRACIA Tobar MD  11:38 AM                    Scribe Attestation:   Scribe #1: I performed the above scribed service and the documentation accurately describes the services I performed. I attest to the accuracy of the note.                          Clinical Impression:       ICD-10-CM ICD-9-CM   1. Popliteal cyst, right M71.21 727.51   2. Knee pain M25.569 719.46   3. Ankle pain M25.579 719.47             ED Disposition Condition    Discharge Stable        ED Prescriptions     None        Follow-up Information     Follow up With Specialties Details Why Contact Info    Margaret Stern MD Internal Medicine, Pediatrics Schedule an appointment as soon as possible for a visit in 3 days For re evaluation [address] [facility] [id number]  [phone]      [facility] Emergency Medicine Go to  As needed, if symptoms worsen, or any other concerns [facility]  [facility] [id number] [phone]                      Scribe attestation: I, Breanne Tobar, personally performed the services described in this documentation. All medical record entries made by the scribe were at my direction and in my presence.  I have reviewed the chart and agree that the record reflects my personal performance and is accurate and complete.               Breanne Tobar MD  06/01/20 1130

## 2020-06-02 ENCOUNTER — PES CALL (OUTPATIENT)
Dept: ADMINISTRATIVE | Facility: CLINIC | Age: 85
End: 2020-06-02

## 2020-07-22 DIAGNOSIS — I10 ESSENTIAL HYPERTENSION, BENIGN: ICD-10-CM

## 2020-07-22 RX ORDER — LOSARTAN POTASSIUM 50 MG/1
TABLET ORAL
Qty: 90 TABLET | Refills: 0 | Status: SHIPPED | OUTPATIENT
Start: 2020-07-22 | End: 2020-08-19 | Stop reason: SDUPTHER

## 2020-07-23 NOTE — TELEPHONE ENCOUNTER
Spoke with the pt and she will have to think about it.  She will call back to schedule an appt.  Patient verbalized understandings.

## 2020-08-05 ENCOUNTER — PATIENT OUTREACH (OUTPATIENT)
Dept: ADMINISTRATIVE | Facility: HOSPITAL | Age: 85
End: 2020-08-05

## 2020-08-05 DIAGNOSIS — E11.40 TYPE 2 DIABETES, CONTROLLED, WITH NEUROPATHY: Primary | ICD-10-CM

## 2020-08-14 PROBLEM — Z85.828 HISTORY OF BASAL CELL CANCER: Status: ACTIVE | Noted: 2019-09-13

## 2020-08-19 ENCOUNTER — LAB VISIT (OUTPATIENT)
Dept: LAB | Facility: HOSPITAL | Age: 85
End: 2020-08-19
Attending: INTERNAL MEDICINE
Payer: MEDICARE

## 2020-08-19 ENCOUNTER — OFFICE VISIT (OUTPATIENT)
Dept: FAMILY MEDICINE | Facility: CLINIC | Age: 85
End: 2020-08-19
Payer: MEDICARE

## 2020-08-19 VITALS
HEIGHT: 66 IN | BODY MASS INDEX: 27 KG/M2 | TEMPERATURE: 98 F | OXYGEN SATURATION: 96 % | HEART RATE: 91 BPM | WEIGHT: 168 LBS | DIASTOLIC BLOOD PRESSURE: 74 MMHG | SYSTOLIC BLOOD PRESSURE: 124 MMHG

## 2020-08-19 DIAGNOSIS — E78.2 MIXED HYPERLIPIDEMIA: ICD-10-CM

## 2020-08-19 DIAGNOSIS — I73.9 PERIPHERAL VASCULAR DISEASE: Chronic | ICD-10-CM

## 2020-08-19 DIAGNOSIS — E11.40 TYPE 2 DIABETES, CONTROLLED, WITH NEUROPATHY: ICD-10-CM

## 2020-08-19 DIAGNOSIS — I70.0 ATHEROSCLEROSIS OF ABDOMINAL AORTA: ICD-10-CM

## 2020-08-19 DIAGNOSIS — N39.0 RECURRENT UTI: ICD-10-CM

## 2020-08-19 DIAGNOSIS — M81.0 OSTEOPOROSIS, POSTMENOPAUSAL: ICD-10-CM

## 2020-08-19 DIAGNOSIS — N25.81 SECONDARY HYPERPARATHYROIDISM OF RENAL ORIGIN: ICD-10-CM

## 2020-08-19 DIAGNOSIS — Z00.00 ROUTINE MEDICAL EXAM: Primary | ICD-10-CM

## 2020-08-19 DIAGNOSIS — E66.3 OVERWEIGHT (BMI 25.0-29.9): ICD-10-CM

## 2020-08-19 DIAGNOSIS — N95.2 POSTMENOPAUSAL ATROPHIC VAGINITIS: ICD-10-CM

## 2020-08-19 DIAGNOSIS — G25.0 ESSENTIAL TREMOR: ICD-10-CM

## 2020-08-19 DIAGNOSIS — Z99.89 AMBULATES WITH CANE: ICD-10-CM

## 2020-08-19 DIAGNOSIS — Q25.1 STENOSIS OF ABDOMINAL AORTA: ICD-10-CM

## 2020-08-19 DIAGNOSIS — N32.81 OAB (OVERACTIVE BLADDER): ICD-10-CM

## 2020-08-19 DIAGNOSIS — I12.9 BENIGN HYPERTENSION WITH CHRONIC KIDNEY DISEASE, STAGE III: ICD-10-CM

## 2020-08-19 DIAGNOSIS — N18.30 BENIGN HYPERTENSION WITH CHRONIC KIDNEY DISEASE, STAGE III: ICD-10-CM

## 2020-08-19 DIAGNOSIS — I77.9 MILD CAROTID ARTERY DISEASE: ICD-10-CM

## 2020-08-19 LAB
ESTIMATED AVG GLUCOSE: 128 MG/DL (ref 68–131)
HBA1C MFR BLD HPLC: 6.1 % (ref 4–5.6)

## 2020-08-19 PROCEDURE — 99999 PR PBB SHADOW E&M-EST. PATIENT-LVL IV: CPT | Mod: PBBFAC,,, | Performed by: INTERNAL MEDICINE

## 2020-08-19 PROCEDURE — 99397 PR PREVENTIVE VISIT,EST,65 & OVER: ICD-10-PCS | Mod: S$GLB,,, | Performed by: INTERNAL MEDICINE

## 2020-08-19 PROCEDURE — 83036 HEMOGLOBIN GLYCOSYLATED A1C: CPT

## 2020-08-19 PROCEDURE — 99397 PER PM REEVAL EST PAT 65+ YR: CPT | Mod: S$GLB,,, | Performed by: INTERNAL MEDICINE

## 2020-08-19 PROCEDURE — 99999 PR PBB SHADOW E&M-EST. PATIENT-LVL IV: ICD-10-PCS | Mod: PBBFAC,,, | Performed by: INTERNAL MEDICINE

## 2020-08-19 PROCEDURE — 36415 COLL VENOUS BLD VENIPUNCTURE: CPT | Mod: PO

## 2020-08-19 RX ORDER — LOSARTAN POTASSIUM 50 MG/1
TABLET ORAL
Qty: 90 TABLET | Refills: 3 | Status: SHIPPED | OUTPATIENT
Start: 2020-08-19 | End: 2021-01-01

## 2020-08-19 NOTE — PROGRESS NOTES
HISTORY OF PRESENT ILLNESS:  Patito Mcintyre is a 89 y.o. female who presents to the clinic today for a routine physical exam. Her last physical exam was approximately 1 years(s) ago.        PAST MEDICAL HISTORY:  Past Medical History:   Diagnosis Date    Atherosclerosis of abdominal aorta     noted on CT scan 10/27/2015    Back pain     BPPV (benign paroxysmal positional vertigo)     Cancer     skin cancer    Chronic kidney disease, stage III (moderate)     Dependent edema     Diabetes with neurologic complications     DVT (deep venous thrombosis)     Essential tremor     Hearing loss     with hearing aides 1/2014    History of shingles 2017    Hyperlipidemia LDL goal <100     Hypertension     Left posterior capsular opacification 3/23/2017    Macular degeneration of left eye     Macular hole     od    Mild carotid artery disease     Mild hyperparathyroidism     Osteoarthritis     Osteoporosis, postmenopausal     Overweight(278.02)     Peripheral vascular disease     Polymyalgia rheumatica     followed by rheumatology, Dr. Mendez    Stenosis of abdominal aorta     noted on CT scan 10/27/2015; probable high-grade stenosis at the origin of the SMA and left renal arteries    Type II or unspecified type diabetes mellitus without mention of complication, not stated as uncontrolled     Urinary incontinence     Varicose veins        PAST SURGICAL HISTORY:  Past Surgical History:   Procedure Laterality Date    bilateral cataract surgery      bilateral leg vein dilation      CATARACT EXTRACTION      ou    EYE SURGERY      CAT EXT  OU    JOINT REPLACEMENT      RIGHT    left knee arthroscopy      left macular degeneration eye surgery      right knee surgery      SKIN CANCER EXCISION  2002, 2017, 2019    Basil Cell; face; received radiation for the one in 2019 at [facility]    SKIN FULL THICKNESS GRAFT Right 9/13/2019    Procedure: POSSIBLE CLOSURE WITH FULL THICKNESS SKIN GRAFT;  Surgeon:  Maximino Mratell MD;  Location: [facility] OR;  Service: Plastics;  Laterality: Right;    SURGICAL REMOVAL OF LESION OF FACE Right 9/13/2019    Procedure: EXCISION MALIGNANT LESION RIGHT NOSE;  Surgeon: Maximino Martell MD;  Location: [facility] OR;  Service: Plastics;  Laterality: Right;  FROZEN SECTION  PRE-OP BY RN  9-    TOTAL ABDOMINAL HYSTERECTOMY      YAG Laser Capsulotomy Left 03/23/2017    Dr. Faulkner       SOCIAL HISTORY:  Social History     Socioeconomic History    Marital status:      Spouse name: Not on file    Number of children: Not on file    Years of education: Not on file    Highest education level: Not on file   Occupational History    Not on file   Social Needs    Financial resource strain: Not on file    Food insecurity     Worry: Not on file     Inability: Not on file    Transportation needs     Medical: Not on file     Non-medical: Not on file   Tobacco Use    Smoking status: Never Smoker    Smokeless tobacco: Never Used   Substance and Sexual Activity    Alcohol use: No    Drug use: No    Sexual activity: Never   Lifestyle    Physical activity     Days per week: Not on file     Minutes per session: Not on file    Stress: Not at all   Relationships    Social connections     Talks on phone: Not on file     Gets together: Not on file     Attends Orthodoxy service: Not on file     Active member of club or organization: Not on file     Attends meetings of clubs or organizations: Not on file     Relationship status: Not on file   Other Topics Concern    Not on file   Social History Narrative    She lives by herself. She is originally from [place].       FAMILY HISTORY:  Family History   Problem Relation Age of Onset    Hypertension Unknown     Edema Unknown     No Known Problems Sister     No Known Problems Mother     No Known Problems Father     No Known Problems Brother     No Known Problems Maternal Aunt     No Known Problems Maternal Uncle     No Known Problems  Paternal Aunt     No Known Problems Paternal Uncle     No Known Problems Maternal Grandmother     No Known Problems Maternal Grandfather     No Known Problems Paternal Grandmother     No Known Problems Paternal Grandfather     Breast cancer Sister     Aneurysm Sister     No Known Problems Sister     Anuerysm Other     Amblyopia Neg Hx     Blindness Neg Hx     Cataracts Neg Hx     Diabetes Neg Hx     Glaucoma Neg Hx     Macular degeneration Neg Hx     Retinal detachment Neg Hx     Strabismus Neg Hx     Stroke Neg Hx     Thyroid disease Neg Hx     Cancer Neg Hx        ALLERGIES AND MEDICATIONS: updated and reviewed.  Review of patient's allergies indicates:   Allergen Reactions    Inderal [propranolol]     Indomethacin sodium      Other reaction(s): Vomiting  Other reaction(s): Nausea     Medication List with Changes/Refills   Current Medications    ACETAMINOPHEN (TYLENOL ORAL)    Take 1 tablet by mouth as needed.    ASPIRIN (ECOTRIN) 81 MG EC TABLET    Take 1 tablet by mouth every evening. Pt. Take orange flavored chewable tablet.    ATORVASTATIN (LIPITOR) 40 MG TABLET    TAKE 1 TABLET BY MOUTH EVERY EVENING    CALCIUM CARBONATE/VITAMIN D3 (OS-WENDY 500 + D3 ORAL)    Take 1 tablet by mouth every evening.     GLUCOSAMINE HCL/CHONDR LEARY A NA (OSTEO BI-FLEX ORAL)    Take 1 tablet by mouth every evening.     OMEGA-3S/DHA/EPA/FISH OIL (OMEGA 3 ORAL)    Take 550 mg by mouth. 1 Capsule Oral Every day    OMEPRAZOLE (PRILOSEC) 20 MG CAPSULE    TAKE ONE CAPSULE BY MOUTH DAILY AT NIGHT AS NEEDED. TAKE ONLY AS NEEDED.    RIVAROXABAN (XARELTO) 20 MG TAB    TAKE 1 TABLET(20 MG) BY MOUTH EVERY DAY    VITAMIN E 200 UNIT CAPSULE    Take 200 Units by mouth every evening.    Changed and/or Refilled Medications    Modified Medication Previous Medication    LOSARTAN (COZAAR) 50 MG TABLET losartan (COZAAR) 50 MG tablet       TAKE 1 TABLET(50 MG) BY MOUTH EVERY DAY IN THE EVENING    TAKE 1 TABLET(50 MG) BY MOUTH EVERY  DAY IN THE EVENING          CARE TEAM:  Patient Care Team:  Margaret Stern MD as PCP - General (Internal Medicine)  Maximino Martell MD as Consulting Physician (Plastic Surgery)  Scotty Landin MD as Consulting Physician (Cardiology)  Seema Alejo MD as Consulting Physician (Urology)  Javon Garza MD as Consulting Physician (Hematology and Oncology)  Funmilayo Koch LPN as Licensed Practical Nurse           SCREENING HISTORY:  Health Maintenance       Date Due Completion Date    Shingles Vaccine (2 of 3) 12/10/2015 10/15/2015    Hemoglobin A1c 03/09/2020 9/9/2019    Eye Exam 08/06/2020 8/6/2019    Urine Microalbumin 09/09/2020 9/9/2019    Influenza Vaccine (1) 09/01/2020 10/4/2019    Lipid Panel 09/09/2020 9/9/2019    DEXA SCAN 09/09/2021 9/9/2019    Override on 7/29/2010: Done    TETANUS VACCINE 02/21/2028 2/21/2018            REVIEW OF SYSTEMS:   The patient reports: good dietary habits. She reports good appetite  The patient reports : that they do not exercise.  Review of Systems   Constitutional: Negative for chills, fatigue, fever and unexpected weight change.   HENT: Negative for congestion and postnasal drip.    Eyes: Negative for pain and visual disturbance.   Respiratory: Negative for cough, shortness of breath and wheezing.    Cardiovascular: Negative for chest pain, palpitations and leg swelling.   Gastrointestinal: Negative for abdominal pain, constipation, diarrhea, nausea and vomiting.   Genitourinary: Negative for dysuria.   Musculoskeletal: Positive for arthralgias and gait problem. Negative for back pain.   Skin: Negative for rash.   Neurological: Positive for tremors. Negative for weakness and headaches.        She feels like she is becoming slightly forgetful.   Psychiatric/Behavioral: Negative for dysphoric mood and sleep disturbance. The patient is not nervous/anxious.       ROS (Optional)-: no pelvic pain  Breast ROS (Optional)-: negative for breast  "lumps/discharge            Physical Examination:   Vitals:    08/19/20 1032   BP: 124/74   Pulse: 91   Temp: 97.5 °F (36.4 °C)     Weight: 76.2 kg (167 lb 15.9 oz)   Height: 5' 6" (167.6 cm)   Body mass index is 27.11 kg/m².      Patient did not require to have a chaperone present during the exam today. She was accompanied by her daughter.    General appearance - alert, well appearing, and in no distress, overweight; exam limited as patient unable to get onto the exam table  Psychiatric - alert, oriented to person, place, and time, normal mood, behavior, speech, dress, motor activity, and thought processes  Eyes - pupils equal and reactive, extraocular eye movements intact, sclera anicteric  Mouth - not examined; patient wearing mask due to Covid 19 pandemic  Neck - supple, no significant adenopathy, carotids upstroke normal bilaterally, no bruits, thyroid exam: thyroid is normal in size without nodules or tenderness  Lymphatics - no palpable cervical lymphadenopathy  Chest - clear to auscultation, no wheezes, rales or rhonchi, symmetric air entry  Heart - normal rate and regular rhythm, no gallops noted  Abdomen - not examined  Breasts - not examined  Back exam - mild limit in range of motion noted on exam due to age, no significant pain with motion noted during exam, in depth exam deferred  Neurological - alert, normal speech, no focal findings noted, cranial nerves II through XII intact, mild-moderate essential tremor noted  Musculoskeletal - patient noted to have Moderate osteoarthritic changes to both knee joints. No joint effusions noted., no muscular tenderness noted; she came in a wheelchair  Extremities - no pedal edema noted, she was wearing support stockings  Skin - normal coloration and turgor, no rashes, no suspicious skin lesions noted      Labs:  Ordered/Scheduled      ASSESSMENT AND PLAN:  1. Routine medical exam  Counseled on age appropriate medical preventative services including age appropriate " cancer screenings, age appropriate eye and dental exams, over all nutritional health, need for a consistent exercise regimen, and an over all push towards maintaining a vigorous and active lifestyle.  Counseled on age appropriate vaccines and discussed upcoming health care needs based on age/gender. Discussed good sleep hygiene and stress management.    2. Type 2 diabetes, controlled, with neuropathy  Diabetes is under good control control at this time for age and comorbid conditions. We discussed diabetic diet and regular exercise. We discussed home blood sugar monitoring, if appropriate - the patient does not need to test daily but can test only as needed. We discussed low sugar/low carbohydrate diet and regular exercise to prevent progression. No need for prescription medication at this time.  Diabetic complications addressed: Neuropathy pain controlled.  Patient was counseled on the need for yearly eye exam to screen for/monitor diabetic retinopathy and yearly diabetic foot exam.  - Microalbumin/creatinine urine ratio; Future    3. Benign hypertension with chronic kidney disease, stage III  Discussed sodium restriction, maintaining ideal body weight and regular exercise program as physiologic means to achieve blood pressure control. The patient will strive towards this.   The current medical regimen is effective;  continue present plan and medications. Recommended patient to check home readings to monitor and see me for followup as scheduled or sooner as needed.   Patient was educated that both decongestant and anti-inflammatory medication may raise blood pressure.  Stable decreased kidney function. Observe. Patient counseled to avoid/minimize the use of anti-inflammatory  Medication. Discussed to stay well hydrated. Also discussed with patient that good control of blood pressure and/or diabetes, if present, will help to prevent progression.  The patient is not eligible for the digital hypertension  program.    4. Mixed hyperlipidemia  We discussed low fat diet and regular exercise.The current medical regimen is effective;  continue present plan and medications.     5. Atherosclerosis of abdominal aorta  Patient with Atherosclerosis of the Aorta.  Stable/asymptomatic. Currently stable on lipid lowering medication and b/p monitoring.    6. Stenosis of abdominal aorta  Stable. Observe.    7. Mild carotid artery disease/8. Peripheral vascular disease  The current medical regimen is effective;  continue present plan and medications.     9. Essential tremor  Stable. Observe.    10. Postmenopausal atrophic vaginitis/11. Recurrent UTI  Stable. Observe. No symptoms at this time.    12. OAB (overactive bladder)  Stable. Observe.    13. Secondary hyperparathyroidism of renal origin  Stable. Asymptomatic. Observe.    14. Osteoporosis, postmenopausal  We discussed adequate calcium and vitamin D supplementation. We discussed fall precautions. She is up to date on her BMD.  She did not get her Reclast infusion last year.  We will wait until COVID-19 pandemic is resolved and resume infusion at that time.    15. Overweight (BMI 25.0-29.9)  The patient is asked to make an attempt to improve diet and exercise patterns to aid in medical management of this problem.    16. Ambulates with cane  We discussed fall precautions.          Follow up in about 1 year (around 8/19/2021), or if symptoms worsen or fail to improve, for annual exam. or sooner as needed.

## 2020-09-18 ENCOUNTER — TELEPHONE (OUTPATIENT)
Dept: FAMILY MEDICINE | Facility: CLINIC | Age: 85
End: 2020-09-18

## 2020-09-18 RX ORDER — SODIUM CHLORIDE 0.9 % (FLUSH) 0.9 %
10 SYRINGE (ML) INJECTION
Status: CANCELLED | OUTPATIENT
Start: 2020-09-18

## 2020-09-18 RX ORDER — HEPARIN 100 UNIT/ML
500 SYRINGE INTRAVENOUS
Status: CANCELLED | OUTPATIENT
Start: 2020-09-18

## 2020-09-18 RX ORDER — ZOLEDRONIC ACID 5 MG/100ML
5 INJECTION, SOLUTION INTRAVENOUS
Status: CANCELLED | OUTPATIENT
Start: 2020-09-18

## 2020-09-18 NOTE — TELEPHONE ENCOUNTER
Please call patient/daughter: I realized after her last office visit that we did not complete her Reclast infusion last year. I would like to set her up for Reclast infusion now - is she ok with that?

## 2020-09-18 NOTE — TELEPHONE ENCOUNTER
----- Message from Margaret Stern MD sent at 8/19/2020 11:28 AM CDT -----  Recommend Reclast infusion.

## 2020-09-18 NOTE — TELEPHONE ENCOUNTER
Spoke with patient daughter Johana informed of recommendation below. She states yes, patient will be ok with that.

## 2020-09-22 ENCOUNTER — TELEPHONE (OUTPATIENT)
Dept: FAMILY MEDICINE | Facility: CLINIC | Age: 85
End: 2020-09-22

## 2020-09-22 NOTE — TELEPHONE ENCOUNTER
Spoke with pt she states was calling to inform provider she scheduled her infusion.Informed pt clinic was aware of appt and to contact office is she needs any additional assistance. Pt verbalized understanding.

## 2020-09-22 NOTE — TELEPHONE ENCOUNTER
----- Message from Leigha Ruth sent at 9/21/2020  9:34 AM CDT -----   Type:  Patient Returning Call  Who Called:FREDY GONZALEZ   Who Left Message for Patient:Jake Stout MA   Does the patient know what this is regarding?:Yes  Best Call Back Number:[phone]  Additional Information:

## 2020-09-23 RX ORDER — OMEPRAZOLE 20 MG/1
CAPSULE, DELAYED RELEASE ORAL
Qty: 180 CAPSULE | Refills: 0 | Status: SHIPPED | OUTPATIENT
Start: 2020-09-23 | End: 2021-03-15 | Stop reason: SDUPTHER

## 2020-09-28 ENCOUNTER — INFUSION (OUTPATIENT)
Dept: INFUSION THERAPY | Facility: HOSPITAL | Age: 85
End: 2020-09-28
Attending: INTERNAL MEDICINE
Payer: MEDICARE

## 2020-09-28 VITALS
OXYGEN SATURATION: 95 % | SYSTOLIC BLOOD PRESSURE: 139 MMHG | HEART RATE: 103 BPM | RESPIRATION RATE: 17 BRPM | DIASTOLIC BLOOD PRESSURE: 88 MMHG | TEMPERATURE: 99 F

## 2020-09-28 DIAGNOSIS — M81.0 OSTEOPOROSIS, POSTMENOPAUSAL: Primary | ICD-10-CM

## 2020-09-28 PROCEDURE — 96374 THER/PROPH/DIAG INJ IV PUSH: CPT

## 2020-09-28 PROCEDURE — 63600175 PHARM REV CODE 636 W HCPCS: Performed by: INTERNAL MEDICINE

## 2020-09-28 RX ORDER — ZOLEDRONIC ACID 5 MG/100ML
5 INJECTION, SOLUTION INTRAVENOUS
Status: COMPLETED | OUTPATIENT
Start: 2020-09-28 | End: 2020-09-28

## 2020-09-28 RX ORDER — HEPARIN 100 UNIT/ML
500 SYRINGE INTRAVENOUS
Status: CANCELLED | OUTPATIENT
Start: 2020-09-28

## 2020-09-28 RX ORDER — SODIUM CHLORIDE 0.9 % (FLUSH) 0.9 %
10 SYRINGE (ML) INJECTION
Status: CANCELLED | OUTPATIENT
Start: 2020-09-28

## 2020-09-28 RX ORDER — ZOLEDRONIC ACID 5 MG/100ML
5 INJECTION, SOLUTION INTRAVENOUS
Status: CANCELLED | OUTPATIENT
Start: 2020-09-28

## 2020-09-28 RX ADMIN — ZOLEDRONIC ACID 5 MG: 5 INJECTION, SOLUTION INTRAVENOUS at 10:09

## 2020-09-28 NOTE — PLAN OF CARE
Patient received Reclast infusion. Tolerated well. VSS. Patient reports taking Vitamin D and Calcium supplements as directed for bone health. Serum Calcium levels remain WNL at this time. She denies any recent falls, jaw pain or teeth extractions within the last 3 months. Patient received discharge instructions and follow up appointments. Verbalized understanding and assisted off unit via wheelchair by daughter. Patient in NAD at time of discharge.

## 2020-10-15 ENCOUNTER — HOSPITAL ENCOUNTER (EMERGENCY)
Facility: HOSPITAL | Age: 85
Discharge: HOME | End: 2020-10-15
Attending: EMERGENCY MEDICINE
Payer: MEDICARE

## 2020-10-15 VITALS
DIASTOLIC BLOOD PRESSURE: 65 MMHG | TEMPERATURE: 98 F | OXYGEN SATURATION: 97 % | SYSTOLIC BLOOD PRESSURE: 144 MMHG | RESPIRATION RATE: 14 BRPM | HEIGHT: 66 IN | WEIGHT: 170 LBS | BODY MASS INDEX: 27.32 KG/M2 | HEART RATE: 87 BPM

## 2020-10-15 DIAGNOSIS — M71.21 BAKER CYST, RIGHT: Primary | ICD-10-CM

## 2020-10-15 DIAGNOSIS — M79.606 LEG PAIN: ICD-10-CM
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
## 2020-10-15 PROCEDURE — 99284 EMERGENCY DEPT VISIT MOD MDM: CPT | Mod: 25

## 2020-10-15 PROCEDURE — 25000003 PHARM REV CODE 250: Performed by: EMERGENCY MEDICINE

## 2020-10-15 RX ORDER — ONDANSETRON 4 MG/1
4 TABLET, ORALLY DISINTEGRATING ORAL EVERY 8 HOURS PRN
Qty: 20 TABLET | Refills: 0 | Status: SHIPPED | OUTPATIENT
Start: 2020-10-15

## 2020-10-15 RX ORDER — HYDROCODONE BITARTRATE AND ACETAMINOPHEN 5; 325 MG/1; MG/1
1 TABLET ORAL EVERY 6 HOURS PRN
Qty: 12 TABLET | Refills: 0 | Status: SHIPPED | OUTPATIENT
Start: 2020-10-15

## 2020-10-15 RX ORDER — HYDROCODONE BITARTRATE AND ACETAMINOPHEN 5; 325 MG/1; MG/1
1 TABLET ORAL
Status: COMPLETED | OUTPATIENT
Start: 2020-10-15 | End: 2020-10-15

## 2020-10-15 RX ADMIN — HYDROCODONE BITARTRATE AND ACETAMINOPHEN 1 TABLET: 5; 325 TABLET ORAL at 11:10

## 2020-10-15 NOTE — ED PROVIDER NOTES
Encounter Date: 10/15/2020    SCRIBE #1 NOTE: I, Marta Ervin, am scribing for, and in the presence of,  Sergio Zabala MD. I have scribed the following portions of the note - Other sections scribed: HPI, ROS, PE.       History     Chief Complaint   Patient presents with    Knee Pain     Pt c/o bilateral chronic leg pain. Worsening to right knee since yesterday      This is a 89 y.o female who has HTN, HLD, Osteoarthritis, Varicose veins, CKD, DM, DVT, PVD presents to the ED for an evaluation for an acute exacerbation of her chronic right knee pain that began yesterday.  Patient denies any falls, trauma, or injuries.  She reports having a right knee replacement 8 years ago at [facility].  Patient denies fever, chills, extremity numbness, extremity weakness, or any other associated symptoms.  No alleviating factors.    The history is provided by the patient.     Review of patient's allergies indicates:   Allergen Reactions    Inderal [propranolol]     Indomethacin sodium      Other reaction(s): Vomiting  Other reaction(s): Nausea     Past Medical History:   Diagnosis Date    Atherosclerosis of abdominal aorta     noted on CT scan 10/27/2015    Back pain     BPPV (benign paroxysmal positional vertigo)     Cancer     skin cancer    Chronic kidney disease, stage III (moderate)     Dependent edema     Diabetes with neurologic complications     DVT (deep venous thrombosis)     Essential tremor     Hearing loss     with hearing aides 1/2014    History of shingles 2017    Hyperlipidemia LDL goal <100     Hypertension     Left posterior capsular opacification 3/23/2017    Macular degeneration of left eye     Macular hole     od    Mild carotid artery disease     Mild hyperparathyroidism     Osteoarthritis     Osteoporosis, postmenopausal     Overweight(278.02)     Peripheral vascular disease     Polymyalgia rheumatica     followed by rheumatology, Dr. Mendez    Stenosis of abdominal aorta      noted on CT scan 10/27/2015; probable high-grade stenosis at the origin of the SMA and left renal arteries    Type II or unspecified type diabetes mellitus without mention of complication, not stated as uncontrolled     Urinary incontinence     Varicose veins      Past Surgical History:   Procedure Laterality Date    bilateral cataract surgery      bilateral leg vein dilation      CATARACT EXTRACTION      ou    EYE SURGERY      CAT EXT  OU    JOINT REPLACEMENT      RIGHT    left knee arthroscopy      left macular degeneration eye surgery      right knee surgery      SKIN CANCER EXCISION  2002, 2017, 2019    Basil Cell; face; received radiation for the one in 2019 at [facility]    SKIN FULL THICKNESS GRAFT Right 9/13/2019    Procedure: POSSIBLE CLOSURE WITH FULL THICKNESS SKIN GRAFT;  Surgeon: Maximino Martell MD;  Location: [facility] OR;  Service: Plastics;  Laterality: Right;    SURGICAL REMOVAL OF LESION OF FACE Right 9/13/2019    Procedure: EXCISION MALIGNANT LESION RIGHT NOSE;  Surgeon: Maximino Martell MD;  Location: [facility] OR;  Service: Plastics;  Laterality: Right;  FROZEN SECTION  PRE-OP BY RN  9-    TOTAL ABDOMINAL HYSTERECTOMY      YAG Laser Capsulotomy Left 03/23/2017    Dr. Faulkner     Family History   Problem Relation Age of Onset    Hypertension Unknown     Edema Unknown     No Known Problems Sister     No Known Problems Mother     No Known Problems Father     No Known Problems Brother     No Known Problems Maternal Aunt     No Known Problems Maternal Uncle     No Known Problems Paternal Aunt     No Known Problems Paternal Uncle     No Known Problems Maternal Grandmother     No Known Problems Maternal Grandfather     No Known Problems Paternal Grandmother     No Known Problems Paternal Grandfather     Breast cancer Sister     Aneurysm Sister     No Known Problems Sister     Anuerysm Other     Amblyopia Neg Hx     Blindness Neg Hx     Cataracts Neg Hx     Diabetes  Neg Hx     Glaucoma Neg Hx     Macular degeneration Neg Hx     Retinal detachment Neg Hx     Strabismus Neg Hx     Stroke Neg Hx     Thyroid disease Neg Hx     Cancer Neg Hx      Social History     Tobacco Use    Smoking status: Never Smoker    Smokeless tobacco: Never Used   Substance Use Topics    Alcohol use: No    Drug use: No     Review of Systems   Constitutional: Negative.    HENT: Negative.    Eyes: Negative.    Respiratory: Negative.    Cardiovascular: Negative.    Gastrointestinal: Negative.    Genitourinary: Negative.    Musculoskeletal: Positive for arthralgias.   Skin: Negative.    Neurological: Negative.        Physical Exam     Initial Vitals [10/15/20 1015]   BP Pulse Resp Temp SpO2   116/64 97 18 98.2 °F (36.8 °C) 98 %      MAP       --         Physical Exam    Constitutional: She appears well-developed and well-nourished. She is not diaphoretic. No distress.   HENT:   Head: Normocephalic and atraumatic.   Nose: Nose normal.   Eyes: EOM are normal. Pupils are equal, round, and reactive to light.   Neck: Normal range of motion. Neck supple. No JVD present.   Cardiovascular: Normal rate, regular rhythm and normal heart sounds.   No murmur heard.  Pulmonary/Chest: Breath sounds normal. No stridor. No respiratory distress. She has no wheezes. She has no rales.   Abdominal: Soft. Bowel sounds are normal. She exhibits no distension. There is no abdominal tenderness.   Musculoskeletal: Normal range of motion. No tenderness.      Comments: Patient has swelling to the right knee.  Patient has a well healed surgical scar to the right anterior knee.   Neurological: She is alert and oriented to person, place, and time. No cranial nerve deficit.   Skin: Skin is warm and dry. Capillary refill takes less than 2 seconds. No rash noted. No erythema.         ED Course   Procedures  Labs Reviewed - No data to display       Imaging Results          US Lower Extremity Veins Right (Final result)  Result time  10/15/20 13:16:20    Final result by Barrington Mary MD (10/15/20 13:16:20)                 Impression:      No evidence of deep venous thrombosis in the right lower extremity.    Approximately 3.7 x 2.3 x 4.1 cm complex cystic appearing mass in the popliteal fossa, likely representing a Baker's cyst.      Electronically signed by: Barrington Mary MD  Date:    10/15/2020  Time:    13:16             Narrative:    EXAMINATION:  US LOWER EXTREMITY VEINS RIGHT    CLINICAL HISTORY:  Pain in leg, unspecified    TECHNIQUE:  Duplex and color flow Doppler evaluation and graded compression of the right lower extremity veins was performed.    COMPARISON:  Ultrasound 06/01/2020    FINDINGS:  Right thigh veins: The common femoral, femoral, popliteal, upper greater saphenous, and deep femoral veins are patent and free of thrombus. The veins are normally compressible and have normal phasic flow and augmentation response.    Right calf veins: The visualized calf veins are patent.    Contralateral CFV: The contralateral (left) common femoral vein is patent and free of thrombus.    Miscellaneous: There is a approximately 3.7 x 2.3 x 4.1 cm complex mostly cystic mass in the medial aspect of the popliteal fossa, likely representing a Baker's cyst.  The lesion was also visualized on the previous ultrasound of 06/01/2020.                               X-Ray Knee 1 or 2 View Right (Final result)  Result time 10/15/20 12:20:35   Procedure changed from X-Ray Knee 3 View Right     Final result by Scotty Lopez MD (10/15/20 12:20:35)                 Impression:      No significant change as compared to the previous examination.    Small joint effusion deep to the quadriceps tendon      Electronically signed by: Scotty Lopez  Date:    10/15/2020  Time:    12:20             Narrative:    EXAMINATION:  XR KNEE 1 OR 2 VIEW RIGHT    CLINICAL HISTORY:  fall;  Unspecified fall, initial encounter    TECHNIQUE:  AP and lateral views of the right  knee were performed.    COMPARISON:  June 1, 2020    FINDINGS:  The patient is status post total knee arthroplasty on the right which appears to be stable.  No apparent evidence of an acute injury involving the patient's native osseous tissues.  There is a joint effusion deep to the quadriceps tendon which is unchanged from the previous examination.                                            Scribe Attestation:   Scribe #1: I performed the above scribed service and the documentation accurately describes the services I performed. I attest to the accuracy of the note.      89-year-old female presenting with acute on chronic right knee pain.  Noted pain with range of motion, no overlying skin changes, no warmth noted, vital signs stable, patient is still able to bear weight on his right knee.  ED workup showing unremarkable x-ray, ultrasound without evidence of DVT, Baker cyst present, similar in appearance to 2 months prior.  Patient was station consistent with symptomatic Baker cyst, no evidence for septic arthritis, fracture, hemarthrosis, or any further surgical or medical emergency.  Discussed with patient and daughter at bedside, including further symptomatic treatment at home, including return precautions.  All questions answered at this time, patient discharged home improved and stable.                        Clinical Impression:     ICD-10-CM ICD-9-CM   1. Baker cyst, right  M71.21 727.51   2. Fall  W19.XXXA E888.9   3. Leg pain  M79.606 729.5                          ED Disposition Condition    Discharge Stable        ED Prescriptions     Medication Sig Dispense Start Date End Date Auth. Provider    HYDROcodone-acetaminophen (NORCO) 5-325 mg per tablet Take 1 tablet by mouth every 6 (six) hours as needed. 12 tablet 10/15/2020  Sergio Zabala MD    ondansetron (ZOFRAN-ODT) 4 MG TbDL Take 1 tablet (4 mg total) by mouth every 8 (eight) hours as needed. 20 tablet 10/15/2020  Sergio Zabala MD         Follow-up Information     Follow up With Specialties Details Why Contact Info    [facility] Go to  If symptoms worsen [address] [place] [address] [phone]    Margaret Stern MD Internal Medicine, Pediatrics Go in 1 week As needed [address] [facility]  [place] MARTIN [id number] [phone]                              I, Sergio Zabala M.D., personally performed the services described in this documentation. All medical record entries made by the scribe were at my direction and in my presence. I have reviewed the chart and agree that the record reflects my personal performance and is accurate and complete.           Sergio Zabala MD  10/17/20 [id number]

## 2020-10-15 NOTE — ED NOTES
Radiology contacted to find out what the status of pt's xray being taken was. [facility] states that someone should be over shortly to take xray. NAD noted at this time. Will continue to monitor pt.

## 2020-10-15 NOTE — ED NOTES
Rounding on the patient has been done. The patient has been updated on the plan of care and current status. Pain was assessed and is currently a 4/10. Comfort positioning and restroom needs were addressed. Necessary items were placed with in reach and was advised when a reassessment would take place. The call bell remains at the bedside for any additional patient needs. The patient is resting comfortably on the stretcher, respirations are even and unlabored, skin warm and dry. Will continue to monitor.

## 2020-10-21 ENCOUNTER — OFFICE VISIT (OUTPATIENT)
Dept: CARDIOLOGY | Facility: CLINIC | Age: 85
End: 2020-10-21
Payer: MEDICARE

## 2020-10-21 ENCOUNTER — PATIENT OUTREACH (OUTPATIENT)
Dept: ADMINISTRATIVE | Facility: OTHER | Age: 85
End: 2020-10-21

## 2020-10-21 VITALS
SYSTOLIC BLOOD PRESSURE: 124 MMHG | HEART RATE: 98 BPM | DIASTOLIC BLOOD PRESSURE: 70 MMHG | WEIGHT: 170.88 LBS | HEIGHT: 66 IN | RESPIRATION RATE: 15 BRPM | OXYGEN SATURATION: 95 % | BODY MASS INDEX: 27.46 KG/M2

## 2020-10-21 DIAGNOSIS — I10 ESSENTIAL HYPERTENSION: ICD-10-CM

## 2020-10-21 DIAGNOSIS — I77.9 MILD CAROTID ARTERY DISEASE: ICD-10-CM

## 2020-10-21 DIAGNOSIS — I87.2 CHRONIC VENOUS INSUFFICIENCY: ICD-10-CM

## 2020-10-21 DIAGNOSIS — Z86.718 HISTORY OF DVT (DEEP VEIN THROMBOSIS): Primary | ICD-10-CM

## 2020-10-21 DIAGNOSIS — I83.813 VARICOSE VEINS OF BILATERAL LOWER EXTREMITIES WITH PAIN: ICD-10-CM

## 2020-10-21 DIAGNOSIS — I73.9 PERIPHERAL VASCULAR DISEASE: ICD-10-CM

## 2020-10-21 DIAGNOSIS — I70.0 AORTIC ATHEROSCLEROSIS: ICD-10-CM

## 2020-10-21 DIAGNOSIS — E78.2 MIXED HYPERLIPIDEMIA: ICD-10-CM

## 2020-10-21 PROCEDURE — 99999 PR PBB SHADOW E&M-EST. PATIENT-LVL IV: CPT | Mod: PBBFAC,,, | Performed by: INTERNAL MEDICINE

## 2020-10-21 PROCEDURE — 1159F MED LIST DOCD IN RCRD: CPT | Mod: S$GLB,,, | Performed by: INTERNAL MEDICINE

## 2020-10-21 PROCEDURE — 99214 PR OFFICE/OUTPT VISIT, EST, LEVL IV, 30-39 MIN: ICD-10-PCS | Mod: S$GLB,,, | Performed by: INTERNAL MEDICINE

## 2020-10-21 PROCEDURE — 1125F PR PAIN SEVERITY QUANTIFIED, PAIN PRESENT: ICD-10-PCS | Mod: S$GLB,,, | Performed by: INTERNAL MEDICINE

## 2020-10-21 PROCEDURE — 99999 PR PBB SHADOW E&M-EST. PATIENT-LVL IV: ICD-10-PCS | Mod: PBBFAC,,, | Performed by: INTERNAL MEDICINE

## 2020-10-21 PROCEDURE — 93000 ELECTROCARDIOGRAM COMPLETE: CPT | Mod: S$GLB,,, | Performed by: INTERNAL MEDICINE

## 2020-10-21 PROCEDURE — 1159F PR MEDICATION LIST DOCUMENTED IN MEDICAL RECORD: ICD-10-PCS | Mod: S$GLB,,, | Performed by: INTERNAL MEDICINE

## 2020-10-21 PROCEDURE — 99214 OFFICE O/P EST MOD 30 MIN: CPT | Mod: S$GLB,,, | Performed by: INTERNAL MEDICINE

## 2020-10-21 PROCEDURE — 93000 EKG 12-LEAD: ICD-10-PCS | Mod: S$GLB,,, | Performed by: INTERNAL MEDICINE

## 2020-10-21 PROCEDURE — 1125F AMNT PAIN NOTED PAIN PRSNT: CPT | Mod: S$GLB,,, | Performed by: INTERNAL MEDICINE

## 2020-10-21 PROCEDURE — 1101F PT FALLS ASSESS-DOCD LE1/YR: CPT | Mod: CPTII,S$GLB,, | Performed by: INTERNAL MEDICINE

## 2020-10-21 PROCEDURE — 1101F PR PT FALLS ASSESS DOC 0-1 FALLS W/OUT INJ PAST YR: ICD-10-PCS | Mod: CPTII,S$GLB,, | Performed by: INTERNAL MEDICINE

## 2020-10-21 NOTE — PROGRESS NOTES
CARDIOVASCULAR PROGRESS NOTE    REASON FOR CONSULT:   Patito Mcintyre is a 89 y.o. female who presents for follow up of htn, hx recurrent DVT, PAD.    PCP: Leena Whiting: Greg Prietoc: Angela  HISTORY OF PRESENT ILLNESS:   Last seen February 2019.    The patient returns for follow-up, accompanied by her daughter.  The main reason for today's office visit is to discuss potentially changing her Xarelto to Coumadin.  The patient tells me the cost of the Xarelto at her last prescription was for 100 dollars for a 30 day supply.  She has apparently not yet met her deductible this year.  We discussed the pros and cons of continued Xarelto and its associated cost versus changing to Coumadin with the associated need for increased testing and dietary modification.  Given her history of recurrent DVT in the past, I do feel she requires long-term anticoagulation.  At present, the patient has decided to continue the Xarelto, and I have given her a written prescription so that she may shop around for a better price.  She otherwise denies angina, dyspnea, palpitations, lightheadedness, dizziness, or syncope.  There has been no PND, orthopnea, or lower extremity edema.  She denies melena, hematuria, or claudicant symptoms.    CARDIOVASCULAR HISTORY:   PAD: Probable high-grade stenosis at the origin of the SMA and left renal arteries. (CTA abd 10/27/15)  CVI  DVT LLE 8/2016, resolved on venous US 2/2017, recurrent 6/2018    PAST MEDICAL HISTORY:     Past Medical History:   Diagnosis Date    Atherosclerosis of abdominal aorta     noted on CT scan 10/27/2015    Back pain     BPPV (benign paroxysmal positional vertigo)     Cancer     skin cancer    Chronic kidney disease, stage III (moderate)     Dependent edema     Diabetes with neurologic complications     DVT (deep venous thrombosis)     Essential tremor     Hearing loss     with hearing aides 1/2014    History of shingles 2017    Hyperlipidemia LDL goal <100      Hypertension     Left posterior capsular opacification 3/23/2017    Macular degeneration of left eye     Macular hole     od    Mild carotid artery disease     Mild hyperparathyroidism     Osteoarthritis     Osteoporosis, postmenopausal     Overweight(278.02)     Peripheral vascular disease     Polymyalgia rheumatica     followed by rheumatology, Dr. Mendez    Stenosis of abdominal aorta     noted on CT scan 10/27/2015; probable high-grade stenosis at the origin of the SMA and left renal arteries    Type II or unspecified type diabetes mellitus without mention of complication, not stated as uncontrolled     Urinary incontinence     Varicose veins        PAST SURGICAL HISTORY:     Past Surgical History:   Procedure Laterality Date    bilateral cataract surgery      bilateral leg vein dilation      CATARACT EXTRACTION      ou    EYE SURGERY      CAT EXT  OU    JOINT REPLACEMENT      RIGHT    left knee arthroscopy      left macular degeneration eye surgery      right knee surgery      SKIN CANCER EXCISION  2002, 2017, 2019    Basil Cell; face; received radiation for the one in 2019 at [facility]    SKIN FULL THICKNESS GRAFT Right 9/13/2019    Procedure: POSSIBLE CLOSURE WITH FULL THICKNESS SKIN GRAFT;  Surgeon: Maximino Martell MD;  Location: [facility] OR;  Service: Plastics;  Laterality: Right;    SURGICAL REMOVAL OF LESION OF FACE Right 9/13/2019    Procedure: EXCISION MALIGNANT LESION RIGHT NOSE;  Surgeon: Maximino Martell MD;  Location: [facility] OR;  Service: Plastics;  Laterality: Right;  FROZEN SECTION  PRE-OP BY RN  9-    TOTAL ABDOMINAL HYSTERECTOMY      YAG Laser Capsulotomy Left 03/23/2017    Dr. Faulkner       ALLERGIES AND MEDICATION:     Review of patient's allergies indicates:   Allergen Reactions    Inderal [propranolol]     Indomethacin sodium      Other reaction(s): Vomiting  Other reaction(s): Nausea     Previous Medications    ACETAMINOPHEN (TYLENOL ORAL)    Take 1  tablet by mouth as needed.    ASPIRIN (ECOTRIN) 81 MG EC TABLET    Take 1 tablet by mouth every evening. Pt. Take orange flavored chewable tablet.    ATORVASTATIN (LIPITOR) 40 MG TABLET    TAKE 1 TABLET BY MOUTH EVERY EVENING    CALCIUM CARBONATE/VITAMIN D3 (OS-WENDY 500 + D3 ORAL)    Take 1 tablet by mouth every evening.     GLUCOSAMINE HCL/CHONDR LEARY A NA (OSTEO BI-FLEX ORAL)    Take 1 tablet by mouth every evening.     HYDROCODONE-ACETAMINOPHEN (NORCO) 5-325 MG PER TABLET    Take 1 tablet by mouth every 6 (six) hours as needed.    LOSARTAN (COZAAR) 50 MG TABLET    TAKE 1 TABLET(50 MG) BY MOUTH EVERY DAY IN THE EVENING    OMEGA-3S/DHA/EPA/FISH OIL (OMEGA 3 ORAL)    Take 550 mg by mouth. 1 Capsule Oral Every day    OMEPRAZOLE (PRILOSEC) 20 MG CAPSULE    TAKE ONE CAPSULE BY MOUTH EVERY NIGHT AS NEEDED    ONDANSETRON (ZOFRAN-ODT) 4 MG TBDL    Take 1 tablet (4 mg total) by mouth every 8 (eight) hours as needed.    RIVAROXABAN (XARELTO) 20 MG TAB    TAKE 1 TABLET(20 MG) BY MOUTH EVERY DAY    VITAMIN E 200 UNIT CAPSULE    Take 200 Units by mouth every evening.        SOCIAL HISTORY:     Social History     Socioeconomic History    Marital status:      Spouse name: Not on file    Number of children: Not on file    Years of education: Not on file    Highest education level: Not on file   Occupational History    Not on file   Social Needs    Financial resource strain: Not on file    Food insecurity     Worry: Not on file     Inability: Not on file    Transportation needs     Medical: Not on file     Non-medical: Not on file   Tobacco Use    Smoking status: Never Smoker    Smokeless tobacco: Never Used   Substance and Sexual Activity    Alcohol use: No    Drug use: No    Sexual activity: Never   Lifestyle    Physical activity     Days per week: Not on file     Minutes per session: Not on file    Stress: Not at all   Relationships    Social connections     Talks on phone: Not on file     Gets together: Not  on file     Attends [employer] service: Not on file     Active member of club or organization: Not on file     Attends meetings of clubs or organizations: Not on file     Relationship status: Not on file   Other Topics Concern    Not on file   Social History Narrative    She lives by herself. She is originally from [place].       FAMILY HISTORY:     Family History   Problem Relation Age of Onset    Hypertension Unknown     Edema Unknown     No Known Problems Sister     No Known Problems Mother     No Known Problems Father     No Known Problems Brother     No Known Problems Maternal Aunt     No Known Problems Maternal Uncle     No Known Problems Paternal Aunt     No Known Problems Paternal Uncle     No Known Problems Maternal Grandmother     No Known Problems Maternal Grandfather     No Known Problems Paternal Grandmother     No Known Problems Paternal Grandfather     Breast cancer Sister     Aneurysm Sister     No Known Problems Sister     Anuerysm Other     Amblyopia Neg Hx     Blindness Neg Hx     Cataracts Neg Hx     Diabetes Neg Hx     Glaucoma Neg Hx     Macular degeneration Neg Hx     Retinal detachment Neg Hx     Strabismus Neg Hx     Stroke Neg Hx     Thyroid disease Neg Hx     Cancer Neg Hx        REVIEW OF SYSTEMS:   Review of Systems   Constitutional: Negative for chills, diaphoresis and fever.   Eyes: Negative for blurred vision, double vision and photophobia.   Respiratory: Negative for hemoptysis, shortness of breath and wheezing.    Cardiovascular: Negative for chest pain, palpitations, orthopnea, claudication, leg swelling and PND.   Gastrointestinal: Negative for abdominal pain, blood in stool, heartburn, melena, nausea and vomiting.   Genitourinary: Negative for flank pain and hematuria.   Musculoskeletal: Positive for joint pain (bilat knees). Negative for falls, myalgias and neck pain.   Skin: Negative for rash.   Neurological: Negative for dizziness, tremors,  "seizures, loss of consciousness, weakness and headaches.   Endo/Heme/Allergies: Negative for polydipsia. Does not bruise/bleed easily.   Psychiatric/Behavioral: Negative for depression and memory loss. The patient is not nervous/anxious.        PHYSICAL EXAM:     Vitals:    10/21/20 1034   BP: 124/70   Pulse: 98   Resp: 15    Body mass index is 27.58 kg/m².  Weight: 77.5 kg (170 lb 13.7 oz)   Height: 5' 6" (167.6 cm)     Physical Exam  Vitals signs reviewed.   Constitutional:       General: She is not in acute distress.     Appearance: She is well-developed. She is not ill-appearing, toxic-appearing or diaphoretic.   HENT:      Head: Normocephalic and atraumatic.   Eyes:      General: No scleral icterus.     Conjunctiva/sclera: Conjunctivae normal.      Pupils: Pupils are equal, round, and reactive to light.   Neck:      Musculoskeletal: Normal range of motion and neck supple. No edema or neck rigidity.      Thyroid: No thyromegaly.      Vascular: Normal carotid pulses. No carotid bruit or JVD.      Trachea: Trachea normal. No tracheal deviation.   Cardiovascular:      Rate and Rhythm: Normal rate and regular rhythm.      Pulses:           Carotid pulses are 2+ on the right side and 2+ on the left side with bruit.     Heart sounds: S1 normal and S2 normal. No murmur. No friction rub. No gallop.       Comments: LLE venous varicosities  Pulmonary:      Effort: Pulmonary effort is normal. No respiratory distress.      Breath sounds: Normal breath sounds. No stridor. No wheezing, rhonchi or rales.   Chest:      Chest wall: No tenderness.   Abdominal:      General: There is no distension.      Palpations: Abdomen is soft.   Musculoskeletal: Normal range of motion.         General: No swelling or tenderness.   Feet:      Right foot:      Skin integrity: No ulcer.      Left foot:      Skin integrity: No ulcer.   Skin:     General: Skin is warm and dry.      Findings: No erythema or rash.   Neurological:      Mental Status: " She is alert and oriented to person, place, and time.      Cranial Nerves: No cranial nerve deficit.   Psychiatric:         Speech: Speech normal.         Behavior: Behavior normal. Behavior is cooperative.         DATA:   EKG: (personally reviewed tracing(s))  10/21/20 SR 98, LAD/PRWP, low volt    Laboratory:  CBC:  Recent Labs   Lab 06/24/18 1915 09/09/19  1004 06/01/20  0953   WBC 7.00 4.19 5.46   Hemoglobin 14.6 15.1 15.0   Hematocrit 43.8 48.3 46.5   Platelets 149 L 178 165       CHEMISTRIES:  Recent Labs   Lab 06/24/18 1915 09/09/19  1004 06/01/20  0953   Glucose 88 103 116 H   Sodium 139 138 137   Potassium 4.7 4.6 4.6   BUN, Bld 19 18 15   Creatinine 1.0 0.9 0.9   eGFR if  56 A >60.0 >60   eGFR if non  49 A 54.9 A 55 A   Calcium 9.7 9.6 9.6       CARDIAC BIOMARKERS:        COAGS:        LIPIDS/LFTS:  Recent Labs   Lab 06/24/18 1915 11/01/18  0752 09/09/19  1004 06/01/20  0953   Cholesterol  --  132 122  --    Triglycerides  --  89 81  --    HDL  --  43 37 L  --    LDL Cholesterol  --  71.2 68.8  --    Non-HDL Cholesterol  --  89 85  --    AST 40  --  29 33   ALT 18  --  15 16       Cardiovascular Testing:  Carotid US 9/26/19  1. Right carotid ultrasound shows 0-19% internal carotid artery stenosis.  Antegrade vertebral artery flow.  2. Left carotid ultrasound shows  0-19% internal carotid artery stenosis.  Antegrade vertebral artery flow.    LE venous US/reflux 6/20/19  1. Right lower extremity venous ultrasound shows no greater or lesser saphenous vein reflux.  The greater saphenous vein is discontinuous distally.  There is no DVT.  2. Left lower extremity venous ultrasound shows greater saphenous vein reflux at the saphenofemoral junction and mid thigh.  The greater saphenous vein is discontinuous distally.  There is no DVT.    LLE venous US 6/24/18 (recurrent LLE DVT vs 2/2017 report)  Extensive left lower extremity acute deep venous thrombosis.    Echo 7/11/16:    1 -  Normal left ventricular systolic function (EF 55-60%).  Basal inferior hypokinesis.     2 - Concentric remodeling.     3 - Left ventricular diastolic dysfunction.     4 - Trivial to mild mitral regurgitation.     5 - Mild tricuspid regurgitation.     6 - Pulmonary hypertension. The estimated PA systolic pressure is 43 mmHg.     CTA abdomen 10/27/15:  Distended contrast-filled bladder. No vesico-enteric fistula identified. Trace amount of contrast seen in the vaginal canal.  Extensive calcified atherosclerotic disease with probable high-grade stenosis at the origin of the SMA and left renal arteries.  Significantly distended gas and fluid-filled stomach.    ASSESSMENT:   # DVT LLE 8/2016, resolved by US 2/2017, recurrent 6/2018.  Hypercoag w/u neg (see labs 8/2/18).  Follows with Dr. Miller with rec for compression rx.  Indef OAC recommended.  Pt will plan continued Xarelto rx.  # Peripheral vascular disease as evidence by the vascular stenoses of her SMA and L renal arteries. Fortunately, these do not seem to be causing and significant problems/symptoms at this juncture.  # Carotid bruits, pt denies prior h/o CVA/TIA. Carotid US 9/2019 neg for significant stenosis.  # HTN, controlled  # HLP, on atorva 40mg  # Venous insufficiency s/p venous stripping, followed by Dr. Miller.  # aortic atherosclerosis (CTA Chest 6/24/18)    PLAN:   Cont med rx  Cont Xarelto to 20mg qd, indef rx planned.  Cont ASA 81mg qd for hx of PAD, can stop in the future if bleeding becomes an issue.  RTC 1 year, sooner prn.    Scotty Landin MD, FACC

## 2020-11-16 ENCOUNTER — TELEPHONE (OUTPATIENT)
Dept: FAMILY MEDICINE | Facility: CLINIC | Age: 85
End: 2020-11-16

## 2020-11-16 DIAGNOSIS — M71.20 SYNOVIAL CYST OF POPLITEAL SPACE, UNSPECIFIED LATERALITY: Primary | ICD-10-CM

## 2020-11-16 NOTE — TELEPHONE ENCOUNTER
----- Message from Tonya Parrish sent at 11/16/2020  4:51 PM CST -----  Regarding: Johana-Daughter  Type: Patient Call Back    Who called: Brett Daughter    What is the request in detail: pt has right leg pain from baker cyst who can she be treated by? And what are the needed steps to take?    Can the clinic reply by [facility]? no    Would the patient rather a call back or a response via [facility]?  Call     Best call back number: [phone]

## 2020-11-16 NOTE — TELEPHONE ENCOUNTER
Spoke with patient's daughter.  Patient when out of town this past weekend for a wedding.  She unfortunately had a lot of pain in her knees from a Baker's cyst.  Daughter is requesting help to get mom some relief.  Discussed recommendation to see Orthopedics.  Consult order placed.  All questions answered.

## 2020-11-19 ENCOUNTER — TELEPHONE (OUTPATIENT)
Dept: FAMILY MEDICINE | Facility: CLINIC | Age: 85
End: 2020-11-19

## 2020-11-19 NOTE — TELEPHONE ENCOUNTER
Patient is scheduled for the first available appointment with Dr. Brown, 12/22 @ 2:15p. She has been added to the waiting list also. I will mail the reminder letter to her. Thanks.

## 2020-12-22 ENCOUNTER — OFFICE VISIT (OUTPATIENT)
Dept: ORTHOPEDICS | Facility: CLINIC | Age: 85
End: 2020-12-22
Payer: MEDICARE

## 2020-12-22 VITALS
DIASTOLIC BLOOD PRESSURE: 60 MMHG | HEART RATE: 77 BPM | RESPIRATION RATE: 18 BRPM | BODY MASS INDEX: 27.7 KG/M2 | OXYGEN SATURATION: 95 % | SYSTOLIC BLOOD PRESSURE: 108 MMHG | WEIGHT: 172.38 LBS | HEIGHT: 66 IN

## 2020-12-22 DIAGNOSIS — M71.20 SYNOVIAL CYST OF POPLITEAL SPACE, UNSPECIFIED LATERALITY: Primary | ICD-10-CM

## 2020-12-22 PROCEDURE — 20610 DRAIN/INJ JOINT/BURSA W/O US: CPT | Mod: LT,S$GLB,, | Performed by: ORTHOPAEDIC SURGERY

## 2020-12-22 PROCEDURE — 3072F LOW RISK FOR RETINOPATHY: CPT | Mod: S$GLB,,, | Performed by: ORTHOPAEDIC SURGERY

## 2020-12-22 PROCEDURE — 99999 PR PBB SHADOW E&M-EST. PATIENT-LVL IV: CPT | Mod: PBBFAC,,, | Performed by: ORTHOPAEDIC SURGERY

## 2020-12-22 PROCEDURE — 1159F PR MEDICATION LIST DOCUMENTED IN MEDICAL RECORD: ICD-10-PCS | Mod: S$GLB,,, | Performed by: ORTHOPAEDIC SURGERY

## 2020-12-22 PROCEDURE — 99213 OFFICE O/P EST LOW 20 MIN: CPT | Mod: 25,S$GLB,, | Performed by: ORTHOPAEDIC SURGERY

## 2020-12-22 PROCEDURE — 1159F MED LIST DOCD IN RCRD: CPT | Mod: S$GLB,,, | Performed by: ORTHOPAEDIC SURGERY

## 2020-12-22 PROCEDURE — 1125F AMNT PAIN NOTED PAIN PRSNT: CPT | Mod: S$GLB,,, | Performed by: ORTHOPAEDIC SURGERY

## 2020-12-22 PROCEDURE — 1157F ADVNC CARE PLAN IN RCRD: CPT | Mod: S$GLB,,, | Performed by: ORTHOPAEDIC SURGERY

## 2020-12-22 PROCEDURE — 1125F PR PAIN SEVERITY QUANTIFIED, PAIN PRESENT: ICD-10-PCS | Mod: S$GLB,,, | Performed by: ORTHOPAEDIC SURGERY

## 2020-12-22 PROCEDURE — 99213 PR OFFICE/OUTPT VISIT, EST, LEVL III, 20-29 MIN: ICD-10-PCS | Mod: 25,S$GLB,, | Performed by: ORTHOPAEDIC SURGERY

## 2020-12-22 PROCEDURE — 3072F PR LOW RISK FOR RETINOPATHY: ICD-10-PCS | Mod: S$GLB,,, | Performed by: ORTHOPAEDIC SURGERY

## 2020-12-22 PROCEDURE — 99999 PR PBB SHADOW E&M-EST. PATIENT-LVL IV: ICD-10-PCS | Mod: PBBFAC,,, | Performed by: ORTHOPAEDIC SURGERY

## 2020-12-22 PROCEDURE — 1157F PR ADVANCE CARE PLAN OR EQUIV PRESENT IN MEDICAL RECORD: ICD-10-PCS | Mod: S$GLB,,, | Performed by: ORTHOPAEDIC SURGERY

## 2020-12-22 PROCEDURE — 20610 LARGE JOINT ASPIRATION/INJECTION: L KNEE: ICD-10-PCS | Mod: LT,S$GLB,, | Performed by: ORTHOPAEDIC SURGERY

## 2020-12-22 RX ORDER — TRIAMCINOLONE ACETONIDE 40 MG/ML
40 INJECTION, SUSPENSION INTRA-ARTICULAR; INTRAMUSCULAR
Status: DISCONTINUED | OUTPATIENT
Start: 2020-12-22 | End: 2020-12-22 | Stop reason: HOSPADM

## 2020-12-22 RX ORDER — LIDOCAINE HYDROCHLORIDE 10 MG/ML
5 INJECTION INFILTRATION; PERINEURAL
Status: DISCONTINUED | OUTPATIENT
Start: 2020-12-22 | End: 2020-12-22 | Stop reason: HOSPADM

## 2020-12-22 RX ADMIN — TRIAMCINOLONE ACETONIDE 40 MG: 40 INJECTION, SUSPENSION INTRA-ARTICULAR; INTRAMUSCULAR at 02:12

## 2020-12-22 RX ADMIN — LIDOCAINE HYDROCHLORIDE 5 ML: 10 INJECTION INFILTRATION; PERINEURAL at 02:12

## 2020-12-22 NOTE — PROGRESS NOTES
Follow up visit    COVID-19 attestation:  This patient was treated during the COVID-19 pandemic.  This was discussed with the patient, they are aware of our current policies and procedures, were given the option of delaying their visit and or switching to a virtual visit, delaying their surgery when applicable, and they elect to proceed.     Interval history (12/22/2020): Patito Mcintyre female returns for follow up of left knee pain     Has multiple MSK complaints -hand pain, elbow pain and swelling, right knee pain (went to ER), and left knee pain, bilateral ankle pain   Today her primary complaint is left knee pain   Still takes Xarleto and cannot take NSAIDs     HPI (4/2019): Patito Mcintyre is a 89 y.o. female who presents today complaining of Pain of the Right Knee     Duration of symptoms:  Several years  Trauma or new activity:  No  Pain is intermittent  Aggravating factors:  Walking  Relieving factors:  No known  Radicular symptoms: no numbness and paresthesias   Associated symptoms:  swelling and giving way.  No popping  She has had multiple DVTs and has swelling in the legs associated with this.  Because of the DVTs she spends a lot of time elevating her legs  Prior treatment:   OTC Tylenol.  Cannot take anti-inflammatories because she is on Xarelto for multiple DVTs.  She has not had injections or physical therapy for this knee.  She does have a history of a right knee replacement, prior to this replacement she did have injections to the right knee    This is the extent of the patient's complaints at this time.       Review of Systems   All other systems reviewed and are negative.        Review of patient's allergies indicates:   Allergen Reactions    Inderal [propranolol]     Indomethacin sodium      Other reaction(s): Vomiting  Other reaction(s): Nausea         Current Outpatient Medications:     ACETAMINOPHEN (TYLENOL ORAL), Take 1 tablet by mouth as needed., Disp: , Rfl:     aspirin (ECOTRIN) 81 MG  EC tablet, Take 1 tablet by mouth every evening. Pt. Take orange flavored chewable tablet., Disp: , Rfl:     atorvastatin (LIPITOR) 40 MG tablet, TAKE 1 TABLET BY MOUTH EVERY EVENING, Disp: 90 tablet, Rfl: 3    CALCIUM CARBONATE/VITAMIN D3 (OS-WENDY 500 + D3 ORAL), Take 1 tablet by mouth every evening. , Disp: , Rfl:     GLUCOSAMINE HCL/CHONDR LEARY A NA (OSTEO BI-FLEX ORAL), Take 1 tablet by mouth every evening. , Disp: , Rfl:     HYDROcodone-acetaminophen (NORCO) 5-325 mg per tablet, Take 1 tablet by mouth every 6 (six) hours as needed., Disp: 12 tablet, Rfl: 0    losartan (COZAAR) 50 MG tablet, TAKE 1 TABLET(50 MG) BY MOUTH EVERY DAY IN THE EVENING, Disp: 90 tablet, Rfl: 3    OMEGA-3S/DHA/EPA/FISH OIL (OMEGA 3 ORAL), Take 550 mg by mouth. 1 Capsule Oral Every day, Disp: , Rfl:     omeprazole (PRILOSEC) 20 MG capsule, TAKE ONE CAPSULE BY MOUTH EVERY NIGHT AS NEEDED, Disp: 180 capsule, Rfl: 0    ondansetron (ZOFRAN-ODT) 4 MG TbDL, Take 1 tablet (4 mg total) by mouth every 8 (eight) hours as needed., Disp: 20 tablet, Rfl: 0    rivaroxaban (XARELTO) 20 mg Tab, TAKE 1 TABLET(20 MG) BY MOUTH EVERY DAY, Disp: 30 tablet, Rfl: 11    vitamin E 200 UNIT capsule, Take 200 Units by mouth every evening. , Disp: , Rfl:     Past Medical History:   Diagnosis Date    Atherosclerosis of abdominal aorta     noted on CT scan 10/27/2015    Back pain     BPPV (benign paroxysmal positional vertigo)     Cancer     skin cancer    Chronic kidney disease, stage III (moderate)     Dependent edema     Diabetes with neurologic complications     DVT (deep venous thrombosis)     Essential tremor     Hearing loss     with hearing aides 1/2014    History of shingles 2017    Hyperlipidemia LDL goal <100     Hypertension     Left posterior capsular opacification 3/23/2017    Macular degeneration of left eye     Macular hole     od    Mild carotid artery disease     Mild hyperparathyroidism     Osteoarthritis      Osteoporosis, postmenopausal     Overweight(278.02)     Peripheral vascular disease     Polymyalgia rheumatica     followed by rheumatology, Dr. Mendez    Stenosis of abdominal aorta     noted on CT scan 10/27/2015; probable high-grade stenosis at the origin of the SMA and left renal arteries    Type II or unspecified type diabetes mellitus without mention of complication, not stated as uncontrolled     Urinary incontinence     Varicose veins        Patient Active Problem List   Diagnosis    Osteoporosis, postmenopausal    Essential tremor    Macular degeneration of left eye    Osteoarthritis    Varicose veins    History of polymyalgia rheumatica    Overweight (BMI 25.0-29.9)    Type 2 diabetes, controlled, with neuropathy    Post herpetic neuralgia    Chronic low back pain    Benign hypertension with chronic kidney disease, stage III    Mixed hyperlipidemia    GERD (gastroesophageal reflux disease)    Postmenopausal atrophic vaginitis    Aortic atherosclerosis    Stenosis of abdominal aorta    Mild carotid artery disease    Peripheral vascular disease    History of DVT (deep vein thrombosis)    Recurrent UTI    OAB (overactive bladder)    Left posterior capsular opacification    Secondary hyperparathyroidism of renal origin    Chronic venous insufficiency    Ambulates with cane    History of basal cell cancer       Past Surgical History:   Procedure Laterality Date    bilateral cataract surgery      bilateral leg vein dilation      CATARACT EXTRACTION      ou    EYE SURGERY      CAT EXT  OU    JOINT REPLACEMENT      RIGHT    left knee arthroscopy      left macular degeneration eye surgery      right knee surgery      SKIN CANCER EXCISION  2002, 2017, 2019    Basil Cell; face; [facility] radiation for the one in 2019 [facility]    SKIN FULL THICKNESS GRAFT Right 9/13/2019    Procedure: POSSIBLE CLOSURE WITH FULL THICKNESS SKIN GRAFT;  Surgeon: Maximino Martell MD;   "Location: [facility] OR;  Service: Plastics;  Laterality: Right;    SURGICAL REMOVAL OF LESION OF FACE Right 9/13/2019    Procedure: EXCISION MALIGNANT LESION RIGHT NOSE;  Surgeon: Maximino Martell MD;  Location: [facility] OR;  Service: Plastics;  Laterality: Right;  FROZEN SECTION  PRE-OP BY RN  9-    TOTAL ABDOMINAL HYSTERECTOMY      YAG Laser Capsulotomy Left 03/23/2017    Dr. Faulkner       Social History     Tobacco Use    Smoking status: Never Smoker    Smokeless tobacco: Never Used   Substance Use Topics    Alcohol use: No    Drug use: No       Family History   Problem Relation Age of Onset    Hypertension Unknown     Edema Unknown     No Known Problems Sister     No Known Problems Mother     No Known Problems Father     No Known Problems Brother     No Known Problems Maternal Aunt     No Known Problems Maternal Uncle     No Known Problems Paternal Aunt     No Known Problems Paternal Uncle     No Known Problems Maternal Grandmother     No Known Problems Maternal Grandfather     No Known Problems Paternal Grandmother     No Known Problems Paternal Grandfather     Breast cancer Sister     Aneurysm Sister     No Known Problems Sister     Anuerysm Other     Amblyopia Neg Hx     Blindness Neg Hx     Cataracts Neg Hx     Diabetes Neg Hx     Glaucoma Neg Hx     Macular degeneration Neg Hx     Retinal detachment Neg Hx     Strabismus Neg Hx     Stroke Neg Hx     Thyroid disease Neg Hx     Cancer Neg Hx        Physical Exam:     Vitals:    12/22/20 1423   BP: 108/60   Pulse: 77   Resp: 18   SpO2: 95%   Weight: 78.2 kg (172 lb 6.4 oz)   Height: 5' 6" (1.676 m)   PainSc:   4   PainLoc: Knee          General: Weight: 78.2 kg (172 lb 6.4 oz) Body mass index is 27.83 kg/m².  Patient is alert, awake and oriented to time, place and person. Mood and affect are appropriate.  Patient does not appear to be in any distress, denies any constitutional symptoms and appears stated age.   HEENT: " Pupils are equal and round, sclera are not injected. External examination of ears and nose reveals no abnormalities. Cranial nerves II-X are grossly intact  Skin: no rashes, abrasions or open wounds on the affected extremity   Resp: No respiratory distress or audible wheezing   CV: 2+ pulses, all extremities warm and well perfused    Left knee  Localizes pain over medial and lateral joint lines - very tender to palpation  No soft tissue swelling or erythema   Moderate effusion  Active ROM: 30 - 90  Passive ROM: 20 - 100 with pain  Non correctable varus deformity   Tender to palpation over patella, medial joint line  and lateral joint line    Poor quadricep muscle tone and bulk; no atrophy compared to contralateral extremity   The knee is ligamentously stable  Normal patellar tracking   No pain with patellar compression   ltsi s/s/sp/dp/t   + ehl/fhl/ta/gs  2 + DP        Imaging:  3 views left knee:  severe tricompartmental OA with subchondral sclerosis, joint space narrowing, osteophyte formation and loose bodies.      Assessment: 89 y.o. female with left knee osteoarthritis    I explained my diagnostic impression and the reasoning behind it in detail, using layman's terms.  Models and/or pictures were used to help in the explanation.      Plan:   - Injection of the left knee  performed, please see procedure note for more details.  Prior to the injection risks and benefits of corticosteroid injection were discussed with the patient including pain, infection, bleeding, skin color changes, swelling, steroid flare. We discussed that over time injections can result in chondral damage, acceleration of arthritis formation, damage to tendons and damage to joints.  The patient consented for the procedure.  Post-injection instructions were given to the patient in writing.  - Tylenol PRN pain    - Return to clinic as needed if symptoms worsen or fail to improve.    All questions were answered in detail. The patient is in full  agreement with the treatment plan and will proceed accordingly.    A note notifying Dr. Margaret Stern of my findings was sent via the electronic medical record     This note was created by combination of typed  and M-Modal dictation. Transcription and phonetic errors may be present.  If there are any questions, please contact me.

## 2020-12-22 NOTE — LETTER
December 22, 2020      Margaret Stern MD  [address] [place] [address]           [facility]  [address]  [place] [address]  Phone: [phone]          Patient: Patito Mcintyre   MR Number: [id number]   YOB: 1925   Date of Visit: 12/22/2020       Dear Dr. Margaret Stern:    Thank you for referring Patito Mcintyre to me for evaluation. Attached you will find relevant portions of my assessment and plan of care.    If you have questions, please do not hesitate to call me. I look forward to following Patito Mcintyre along with you.    Sincerely,    Jayshree Brown MD    Enclosure  CC:  No Recipients    If you would like to receive this communication electronically, please contact [email or web address] or [phone] to request more information on [employer] access.    For providers and/or their staff who would like to refer a patient to [facility], please contact us through our one-stop-shop provider referral line, [facility], at [phone].    If you feel you have received this communication in error or would no longer like to receive these types of communications, please e-mail [email or web address]

## 2020-12-22 NOTE — PROCEDURES
Large Joint Aspiration/Injection: L knee    Date/Time: 12/22/2020 2:15 PM  Performed by: Jayshree Brown MD  Authorized by: Jayshree Brown MD     Consent Done?:  Yes (Verbal)  Indications:  Arthritis  Timeout: prior to procedure the correct patient, procedure, and site was verified    Prep: patient was prepped and draped in usual sterile fashion      Local anesthesia used?: Yes    Local anesthetic:  Topical anesthetic    Details:  Needle Size:  22 G  Approach:  Anteromedial  Location:  Knee  Site:  L knee  Medications:  5 mL lidocaine HCL 10 mg/ml (1%) 10 mg/mL (1 %); 40 mg triamcinolone acetonide 40 mg/mL  Patient tolerance:  Patient tolerated the procedure well with no immediate complications

## 2021-01-01 DIAGNOSIS — I12.9 BENIGN HYPERTENSION WITH CHRONIC KIDNEY DISEASE, STAGE III: ICD-10-CM

## 2021-01-01 DIAGNOSIS — N18.30 BENIGN HYPERTENSION WITH CHRONIC KIDNEY DISEASE, STAGE III: ICD-10-CM

## 2021-01-01 RX ORDER — LOSARTAN POTASSIUM 50 MG/1
TABLET ORAL
Qty: 90 TABLET | Refills: 0 | Status: SHIPPED | OUTPATIENT
Start: 2021-01-01

## 2021-01-20 RX ORDER — RIVAROXABAN 20 MG/1
TABLET, FILM COATED ORAL
Qty: 90 TABLET | Refills: 3 | Status: SHIPPED | OUTPATIENT
Start: 2021-01-20

## 2021-03-15 RX ORDER — OMEPRAZOLE 20 MG/1
CAPSULE, DELAYED RELEASE ORAL
Qty: 180 CAPSULE | Refills: 0 | Status: SHIPPED | OUTPATIENT
Start: 2021-03-15

## 2021-04-06 RX ORDER — ATORVASTATIN CALCIUM 40 MG/1
40 TABLET, FILM COATED ORAL NIGHTLY
Qty: 90 TABLET | Refills: 0 | Status: SHIPPED | OUTPATIENT
Start: 2021-04-06

## 2022-05-04 NOTE — OP NOTE
DATE OF PROCEDURE:  09/13/2019.    SURGEON:  Maximino Martell M.D.    PREOPERATIVE DIAGNOSIS:  Ulcerative lesion, right nose.    POSTOPERATIVE DIAGNOSIS:  Basal cell carcinoma.    PROCEDURES:  Excision of basal cell carcinoma about 2 x 2 cm, right nose and   closure with full-thickness skin graft.    ANESTHESIA:  Local MAC.    INDICATIONS:  This is a case of 89-year-old female patient who developed   ulcerative lesion in the last couple of months with bleeding.  She is using   blood thinner.  She was seen in my office and decision to go ahead and bring her   to avoid stopping the blood thinner twice, was cleared by the cardiologist to   hold it for three days before.  Operative procedure as well as the complications   was explained to the daughter who understood and signed the consent.    PROCEDURE IN DETAIL:  Under sterile condition, local anesthesia using 1%   lidocaine with epinephrine, about 10 mL total, excision of the lesion about 2 x   2 cm sent for frozen section with some positive margins, just close to the   margins between 1:00 and 3:00 o'clock and reexcision between 12 and 6 o'clock   and sent for permanent, removing about 3 mm of the area and then coagulation of   the bleeder.  A full-thickness skin graft was taken from the left preauricular   area.  Closure was with a 4-0 Monocryl dermis and subcuticular Steri-Strip.    Then, defatting of full-thickness skin graft and placing the area and a bolster   was done with a 3-0 chromic and 4-0 plain and Xeroform gauze and bacitracin   ointment.  The patient tolerated the procedure without complication.  Estimated   blood loss was nil.  The patient was sent to Same Day Surgery and will be   discharged if okay by Anesthesia.      KENYATTA  dd: 09/13/2019 08:24:58 (CDT)  td: 09/13/2019 11:20:02 (CDT)  Doc ID   #[id number]  Job ID #[id number]    CC:   
stage I

## 2023-06-01 ENCOUNTER — PATIENT OUTREACH (OUTPATIENT)
Dept: ADMINISTRATIVE | Facility: HOSPITAL | Age: 88
End: 2023-06-01
Payer: MEDICARE

## 2023-06-01 NOTE — PROGRESS NOTES
[facility] 1 Panel Report 05.25.23 - Left message for patient's daughter to call our office. The patient's home number is no longer in service. Please schedule.

## 2023-06-02 NOTE — PROGRESS NOTES
Incoming call received on 2023 from the patient's daughter Johana, she reports that her mother passed away year ago. She is requesting removal of her chart. The patient's chart will be updated reflecting her change in status and an email will be sent to HIM  patients.

## (undated) DEVICE — Device

## (undated) DEVICE — CORD FOR BIPOLAR FORCEPS 12

## (undated) DEVICE — SYS LABLNG CORECT MED 4 FLG

## (undated) DEVICE — DRESSING XEROFORM FOIL PK 1X8

## (undated) DEVICE — SEE MEDLINE ITEM 152622

## (undated) DEVICE — STRIP STERI REIN CLSR 1/2X2IN

## (undated) DEVICE — SUT CHROMIC 3-0 PS2 27IN BR

## (undated) DEVICE — SEE MEDLINE ITEM 157117

## (undated) DEVICE — SEE MEDLINE ITEM 154981

## (undated) DEVICE — GAUZE SPONGE 4X4 12PLY

## (undated) DEVICE — KIT PREP E-Z WET W/2-6

## (undated) DEVICE — PACK HEAD & NECK

## (undated) DEVICE — ELECTRODE REM PLYHSV RETURN 9

## (undated) DEVICE — SUPPORT ULNA NERVE PROTECTOR

## (undated) DEVICE — APPLICATOR STERILE 3IN

## (undated) DEVICE — BLADE SURG STAINLESS STEEL #15

## (undated) DEVICE — SUT ETHILON 3/0 18IN PS-1

## (undated) DEVICE — SYR 10CC LUER LOCK

## (undated) DEVICE — SOL 9P NACL IRR PIC IL

## (undated) DEVICE — NDL HYPO REG 25G X 1 1/2

## (undated) DEVICE — SPONGE GAUZE 16PLY 4X4

## (undated) DEVICE — SUT MONOCRYL 4-0 SH UND MON

## (undated) DEVICE — SUT PLN 4-0 P3 18IN GUT YEL

## (undated) DEVICE — GLOVE SURG BIOGEL LATEX SZ 7.5

## (undated) DEVICE — CONTAINER SPECIMEN STRL 4OZ